# Patient Record
Sex: FEMALE | Race: WHITE | Employment: OTHER | URBAN - NONMETROPOLITAN AREA
[De-identification: names, ages, dates, MRNs, and addresses within clinical notes are randomized per-mention and may not be internally consistent; named-entity substitution may affect disease eponyms.]

---

## 2017-02-15 ENCOUNTER — TELEPHONE (OUTPATIENT)
Dept: INTERNAL MEDICINE | Age: 62
End: 2017-02-15

## 2017-02-28 ENCOUNTER — EVALUATION (OUTPATIENT)
Dept: PHYSICAL THERAPY | Age: 62
End: 2017-02-28

## 2017-02-28 DIAGNOSIS — R26.9 GAIT ABNORMALITY: Primary | ICD-10-CM

## 2017-02-28 PROCEDURE — 97161 PT EVAL LOW COMPLEX 20 MIN: CPT | Performed by: PHYSICAL THERAPIST

## 2017-02-28 PROCEDURE — 97112 NEUROMUSCULAR REEDUCATION: CPT | Performed by: PHYSICAL THERAPIST

## 2017-03-06 ENCOUNTER — TREATMENT (OUTPATIENT)
Dept: PHYSICAL THERAPY | Age: 62
End: 2017-03-06

## 2017-03-06 DIAGNOSIS — R26.9 GAIT ABNORMALITY: Primary | ICD-10-CM

## 2017-03-06 PROCEDURE — 97110 THERAPEUTIC EXERCISES: CPT | Performed by: PHYSICAL THERAPIST

## 2017-03-06 PROCEDURE — 97112 NEUROMUSCULAR REEDUCATION: CPT | Performed by: PHYSICAL THERAPIST

## 2017-03-08 ENCOUNTER — TREATMENT (OUTPATIENT)
Dept: PHYSICAL THERAPY | Age: 62
End: 2017-03-08

## 2017-03-08 DIAGNOSIS — R26.9 GAIT ABNORMALITY: Primary | ICD-10-CM

## 2017-03-08 PROCEDURE — 97112 NEUROMUSCULAR REEDUCATION: CPT | Performed by: PHYSICAL THERAPIST

## 2017-03-08 PROCEDURE — 97110 THERAPEUTIC EXERCISES: CPT | Performed by: PHYSICAL THERAPIST

## 2017-03-10 ENCOUNTER — OFFICE VISIT (OUTPATIENT)
Dept: FAMILY MEDICINE CLINIC | Age: 62
End: 2017-03-10

## 2017-03-10 ENCOUNTER — TREATMENT (OUTPATIENT)
Dept: PHYSICAL THERAPY | Age: 62
End: 2017-03-10

## 2017-03-10 VITALS
HEART RATE: 80 BPM | BODY MASS INDEX: 26.58 KG/M2 | DIASTOLIC BLOOD PRESSURE: 80 MMHG | RESPIRATION RATE: 16 BRPM | WEIGHT: 150 LBS | SYSTOLIC BLOOD PRESSURE: 120 MMHG | HEIGHT: 63 IN

## 2017-03-10 DIAGNOSIS — R26.9 GAIT ABNORMALITY: Primary | ICD-10-CM

## 2017-03-10 DIAGNOSIS — I63.012 CEREBROVASCULAR ACCIDENT (CVA) DUE TO THROMBOSIS OF LEFT VERTEBRAL ARTERY (HCC): Primary | ICD-10-CM

## 2017-03-10 PROCEDURE — 97110 THERAPEUTIC EXERCISES: CPT | Performed by: PHYSICAL THERAPIST

## 2017-03-10 PROCEDURE — G8598 ASA/ANTIPLAT THER USED: HCPCS | Performed by: FAMILY MEDICINE

## 2017-03-10 PROCEDURE — G8484 FLU IMMUNIZE NO ADMIN: HCPCS | Performed by: FAMILY MEDICINE

## 2017-03-10 PROCEDURE — 1036F TOBACCO NON-USER: CPT | Performed by: FAMILY MEDICINE

## 2017-03-10 PROCEDURE — 3017F COLORECTAL CA SCREEN DOC REV: CPT | Performed by: FAMILY MEDICINE

## 2017-03-10 PROCEDURE — 97112 NEUROMUSCULAR REEDUCATION: CPT | Performed by: PHYSICAL THERAPIST

## 2017-03-10 PROCEDURE — 3014F SCREEN MAMMO DOC REV: CPT | Performed by: FAMILY MEDICINE

## 2017-03-10 PROCEDURE — 99214 OFFICE O/P EST MOD 30 MIN: CPT | Performed by: FAMILY MEDICINE

## 2017-03-10 PROCEDURE — G8419 CALC BMI OUT NRM PARAM NOF/U: HCPCS | Performed by: FAMILY MEDICINE

## 2017-03-10 PROCEDURE — G8427 DOCREV CUR MEDS BY ELIG CLIN: HCPCS | Performed by: FAMILY MEDICINE

## 2017-03-10 RX ORDER — CLOPIDOGREL BISULFATE 75 MG/1
TABLET ORAL
Refills: 0 | COMMUNITY
Start: 2017-02-24 | End: 2017-04-25 | Stop reason: SDUPTHER

## 2017-03-10 RX ORDER — ATORVASTATIN CALCIUM 40 MG/1
TABLET, FILM COATED ORAL
Refills: 0 | COMMUNITY
Start: 2017-02-27 | End: 2017-04-25 | Stop reason: SDUPTHER

## 2017-03-10 ASSESSMENT — ENCOUNTER SYMPTOMS
COUGH: 0
SORE THROAT: 0
SINUS PRESSURE: 0
SHORTNESS OF BREATH: 0
EYE DISCHARGE: 0
NAUSEA: 0
RHINORRHEA: 0
EYE REDNESS: 0
VOMITING: 0
WHEEZING: 0
ABDOMINAL PAIN: 0
DIARRHEA: 0
TROUBLE SWALLOWING: 0
CONSTIPATION: 0

## 2017-03-14 ENCOUNTER — TREATMENT (OUTPATIENT)
Dept: PHYSICAL THERAPY | Age: 62
End: 2017-03-14
Payer: COMMERCIAL

## 2017-03-14 DIAGNOSIS — R26.9 GAIT ABNORMALITY: Primary | ICD-10-CM

## 2017-03-14 PROCEDURE — 97112 NEUROMUSCULAR REEDUCATION: CPT | Performed by: PHYSICAL THERAPIST

## 2017-03-14 PROCEDURE — 97110 THERAPEUTIC EXERCISES: CPT | Performed by: PHYSICAL THERAPIST

## 2017-03-15 ENCOUNTER — TREATMENT (OUTPATIENT)
Dept: PHYSICAL THERAPY | Age: 62
End: 2017-03-15
Payer: COMMERCIAL

## 2017-03-15 DIAGNOSIS — R26.9 GAIT ABNORMALITY: Primary | ICD-10-CM

## 2017-03-15 PROCEDURE — 97112 NEUROMUSCULAR REEDUCATION: CPT | Performed by: PHYSICAL THERAPIST

## 2017-04-04 ENCOUNTER — OFFICE VISIT (OUTPATIENT)
Dept: PRIMARY CARE CLINIC | Age: 62
End: 2017-04-04
Payer: COMMERCIAL

## 2017-04-04 VITALS
WEIGHT: 149 LBS | TEMPERATURE: 100.8 F | SYSTOLIC BLOOD PRESSURE: 134 MMHG | RESPIRATION RATE: 16 BRPM | DIASTOLIC BLOOD PRESSURE: 70 MMHG | OXYGEN SATURATION: 94 % | HEART RATE: 115 BPM | BODY MASS INDEX: 25.44 KG/M2 | HEIGHT: 64 IN

## 2017-04-04 DIAGNOSIS — J10.1 INFLUENZA B: Primary | ICD-10-CM

## 2017-04-04 DIAGNOSIS — R50.9 FEVER, UNSPECIFIED FEVER CAUSE: ICD-10-CM

## 2017-04-04 LAB
INFLUENZA A ANTIBODY: NEGATIVE
INFLUENZA B ANTIBODY: POSITIVE

## 2017-04-04 PROCEDURE — 3014F SCREEN MAMMO DOC REV: CPT | Performed by: FAMILY MEDICINE

## 2017-04-04 PROCEDURE — 1036F TOBACCO NON-USER: CPT | Performed by: FAMILY MEDICINE

## 2017-04-04 PROCEDURE — 87804 INFLUENZA ASSAY W/OPTIC: CPT | Performed by: FAMILY MEDICINE

## 2017-04-04 PROCEDURE — G8427 DOCREV CUR MEDS BY ELIG CLIN: HCPCS | Performed by: FAMILY MEDICINE

## 2017-04-04 PROCEDURE — 99214 OFFICE O/P EST MOD 30 MIN: CPT | Performed by: FAMILY MEDICINE

## 2017-04-04 PROCEDURE — 3017F COLORECTAL CA SCREEN DOC REV: CPT | Performed by: FAMILY MEDICINE

## 2017-04-04 PROCEDURE — G8598 ASA/ANTIPLAT THER USED: HCPCS | Performed by: FAMILY MEDICINE

## 2017-04-04 PROCEDURE — G8419 CALC BMI OUT NRM PARAM NOF/U: HCPCS | Performed by: FAMILY MEDICINE

## 2017-04-04 RX ORDER — OSELTAMIVIR PHOSPHATE 75 MG/1
75 CAPSULE ORAL 2 TIMES DAILY
Qty: 10 CAPSULE | Refills: 0 | Status: SHIPPED | OUTPATIENT
Start: 2017-04-04 | End: 2017-04-17 | Stop reason: ALTCHOICE

## 2017-04-04 ASSESSMENT — ENCOUNTER SYMPTOMS
COUGH: 1
SORE THROAT: 1

## 2017-04-05 ASSESSMENT — ENCOUNTER SYMPTOMS
VOMITING: 0
BACK PAIN: 0
EYES NEGATIVE: 1
CONSTIPATION: 0
ABDOMINAL PAIN: 0
NAUSEA: 0
RHINORRHEA: 0
ABDOMINAL DISTENTION: 0
DIARRHEA: 0
SINUS PRESSURE: 0

## 2017-04-17 ENCOUNTER — OFFICE VISIT (OUTPATIENT)
Dept: PRIMARY CARE CLINIC | Age: 62
End: 2017-04-17
Payer: COMMERCIAL

## 2017-04-17 ENCOUNTER — TELEPHONE (OUTPATIENT)
Dept: FAMILY MEDICINE CLINIC | Age: 62
End: 2017-04-17

## 2017-04-17 VITALS
TEMPERATURE: 96.5 F | HEIGHT: 64 IN | SYSTOLIC BLOOD PRESSURE: 134 MMHG | OXYGEN SATURATION: 98 % | RESPIRATION RATE: 12 BRPM | WEIGHT: 150.4 LBS | DIASTOLIC BLOOD PRESSURE: 76 MMHG | HEART RATE: 78 BPM | BODY MASS INDEX: 25.68 KG/M2

## 2017-04-17 DIAGNOSIS — L72.3 INFECTED SEBACEOUS CYST: Primary | ICD-10-CM

## 2017-04-17 DIAGNOSIS — L08.9 INFECTED SEBACEOUS CYST: Primary | ICD-10-CM

## 2017-04-17 PROCEDURE — G8598 ASA/ANTIPLAT THER USED: HCPCS | Performed by: NURSE PRACTITIONER

## 2017-04-17 PROCEDURE — 3017F COLORECTAL CA SCREEN DOC REV: CPT | Performed by: NURSE PRACTITIONER

## 2017-04-17 PROCEDURE — 99213 OFFICE O/P EST LOW 20 MIN: CPT | Performed by: NURSE PRACTITIONER

## 2017-04-17 PROCEDURE — G8419 CALC BMI OUT NRM PARAM NOF/U: HCPCS | Performed by: NURSE PRACTITIONER

## 2017-04-17 PROCEDURE — 1036F TOBACCO NON-USER: CPT | Performed by: NURSE PRACTITIONER

## 2017-04-17 PROCEDURE — 3014F SCREEN MAMMO DOC REV: CPT | Performed by: NURSE PRACTITIONER

## 2017-04-17 PROCEDURE — G8427 DOCREV CUR MEDS BY ELIG CLIN: HCPCS | Performed by: NURSE PRACTITIONER

## 2017-04-17 RX ORDER — DOXYCYCLINE HYCLATE 100 MG
100 TABLET ORAL 2 TIMES DAILY
Qty: 20 TABLET | Refills: 0 | Status: SHIPPED | OUTPATIENT
Start: 2017-04-17 | End: 2017-06-07 | Stop reason: ALTCHOICE

## 2017-04-17 ASSESSMENT — ENCOUNTER SYMPTOMS
CONSTIPATION: 0
NAUSEA: 0
DIARRHEA: 0
COUGH: 0
SINUS PRESSURE: 0
ALLERGIC/IMMUNOLOGIC NEGATIVE: 1
SHORTNESS OF BREATH: 0
ABDOMINAL PAIN: 0
GASTROINTESTINAL NEGATIVE: 1
EYES NEGATIVE: 1
TROUBLE SWALLOWING: 0
VOMITING: 0
RESPIRATORY NEGATIVE: 1
CHEST TIGHTNESS: 0

## 2017-04-25 ENCOUNTER — INITIAL CONSULT (OUTPATIENT)
Dept: SURGERY | Age: 62
End: 2017-04-25
Payer: COMMERCIAL

## 2017-04-25 ENCOUNTER — HOSPITAL ENCOUNTER (OUTPATIENT)
Age: 62
Setting detail: SPECIMEN
Discharge: HOME | End: 2017-04-25
Payer: COMMERCIAL

## 2017-04-25 VITALS
WEIGHT: 149.4 LBS | DIASTOLIC BLOOD PRESSURE: 80 MMHG | BODY MASS INDEX: 25.51 KG/M2 | SYSTOLIC BLOOD PRESSURE: 124 MMHG | RESPIRATION RATE: 12 BRPM | HEART RATE: 88 BPM | TEMPERATURE: 98.2 F | HEIGHT: 64 IN

## 2017-04-25 DIAGNOSIS — L72.0 EPIDERMAL CYST OF NECK: Primary | ICD-10-CM

## 2017-04-25 PROCEDURE — 11423 EXC H-F-NK-SP B9+MARG 2.1-3: CPT | Performed by: SURGERY

## 2017-04-25 PROCEDURE — 1036F TOBACCO NON-USER: CPT | Performed by: SURGERY

## 2017-04-25 RX ORDER — CLOPIDOGREL BISULFATE 75 MG/1
TABLET ORAL
Qty: 90 TABLET | Refills: 1 | Status: SHIPPED | OUTPATIENT
Start: 2017-04-25 | End: 2017-12-07 | Stop reason: SDUPTHER

## 2017-04-25 RX ORDER — ATORVASTATIN CALCIUM 40 MG/1
TABLET, FILM COATED ORAL
Qty: 90 TABLET | Refills: 1 | Status: SHIPPED | OUTPATIENT
Start: 2017-04-25 | End: 2017-12-07 | Stop reason: SDUPTHER

## 2017-04-27 LAB — DERMATOLOGY PATHOLOGY REPORT: NORMAL

## 2017-05-04 ENCOUNTER — TELEPHONE (OUTPATIENT)
Dept: SURGERY | Age: 62
End: 2017-05-04

## 2017-05-12 ENCOUNTER — OFFICE VISIT (OUTPATIENT)
Dept: SURGERY | Age: 62
End: 2017-05-12

## 2017-05-12 VITALS
TEMPERATURE: 97.7 F | BODY MASS INDEX: 25.95 KG/M2 | WEIGHT: 152 LBS | DIASTOLIC BLOOD PRESSURE: 68 MMHG | SYSTOLIC BLOOD PRESSURE: 118 MMHG | HEIGHT: 64 IN

## 2017-05-12 DIAGNOSIS — L72.0 EPIDERMAL CYST OF NECK: Primary | ICD-10-CM

## 2017-05-12 DIAGNOSIS — T81.31XA WOUND DEHISCENCE, INITIAL ENCOUNTER: ICD-10-CM

## 2017-05-12 PROCEDURE — 99024 POSTOP FOLLOW-UP VISIT: CPT | Performed by: SURGERY

## 2017-05-19 DIAGNOSIS — Z11.59 NEED FOR HEPATITIS C SCREENING TEST: ICD-10-CM

## 2017-05-19 DIAGNOSIS — Z11.4 SCREENING FOR HIV (HUMAN IMMUNODEFICIENCY VIRUS): Primary | ICD-10-CM

## 2017-06-07 ENCOUNTER — OFFICE VISIT (OUTPATIENT)
Dept: FAMILY MEDICINE CLINIC | Age: 62
End: 2017-06-07
Payer: COMMERCIAL

## 2017-06-07 VITALS
HEIGHT: 64 IN | SYSTOLIC BLOOD PRESSURE: 110 MMHG | RESPIRATION RATE: 16 BRPM | DIASTOLIC BLOOD PRESSURE: 76 MMHG | HEART RATE: 80 BPM | WEIGHT: 153 LBS | BODY MASS INDEX: 26.12 KG/M2

## 2017-06-07 DIAGNOSIS — E03.9 HYPOTHYROIDISM, UNSPECIFIED TYPE: ICD-10-CM

## 2017-06-07 DIAGNOSIS — Z23 NEED FOR PROPHYLACTIC VACCINATION AGAINST DIPHTHERIA-TETANUS-PERTUSSIS (DTP): Primary | ICD-10-CM

## 2017-06-07 DIAGNOSIS — E78.2 MIXED HYPERLIPIDEMIA: ICD-10-CM

## 2017-06-07 DIAGNOSIS — I63.012 CEREBROVASCULAR ACCIDENT (CVA) DUE TO THROMBOSIS OF LEFT VERTEBRAL ARTERY (HCC): ICD-10-CM

## 2017-06-07 DIAGNOSIS — M17.11 PRIMARY OSTEOARTHRITIS OF RIGHT KNEE: ICD-10-CM

## 2017-06-07 DIAGNOSIS — R73.01 IMPAIRED FASTING GLUCOSE: ICD-10-CM

## 2017-06-07 PROCEDURE — G8598 ASA/ANTIPLAT THER USED: HCPCS | Performed by: FAMILY MEDICINE

## 2017-06-07 PROCEDURE — 3017F COLORECTAL CA SCREEN DOC REV: CPT | Performed by: FAMILY MEDICINE

## 2017-06-07 PROCEDURE — G8427 DOCREV CUR MEDS BY ELIG CLIN: HCPCS | Performed by: FAMILY MEDICINE

## 2017-06-07 PROCEDURE — G8419 CALC BMI OUT NRM PARAM NOF/U: HCPCS | Performed by: FAMILY MEDICINE

## 2017-06-07 PROCEDURE — 99214 OFFICE O/P EST MOD 30 MIN: CPT | Performed by: FAMILY MEDICINE

## 2017-06-07 PROCEDURE — 3014F SCREEN MAMMO DOC REV: CPT | Performed by: FAMILY MEDICINE

## 2017-06-07 PROCEDURE — 1036F TOBACCO NON-USER: CPT | Performed by: FAMILY MEDICINE

## 2017-06-07 RX ORDER — LEVOTHYROXINE SODIUM 0.07 MG/1
75 TABLET ORAL DAILY
Qty: 90 TABLET | Refills: 1 | Status: SHIPPED | OUTPATIENT
Start: 2017-06-07 | End: 2017-12-07 | Stop reason: SDUPTHER

## 2017-06-12 ASSESSMENT — ENCOUNTER SYMPTOMS
NAUSEA: 0
EYE REDNESS: 0
ABDOMINAL PAIN: 0
SHORTNESS OF BREATH: 0
CONSTIPATION: 0
RHINORRHEA: 0
DIARRHEA: 0
SINUS PRESSURE: 0
SORE THROAT: 0
COUGH: 0
WHEEZING: 0
VOMITING: 0
EYE DISCHARGE: 0
TROUBLE SWALLOWING: 0

## 2017-11-20 ENCOUNTER — OFFICE VISIT (OUTPATIENT)
Dept: PRIMARY CARE CLINIC | Age: 62
End: 2017-11-20
Payer: COMMERCIAL

## 2017-11-20 VITALS
HEIGHT: 64 IN | DIASTOLIC BLOOD PRESSURE: 74 MMHG | HEART RATE: 88 BPM | TEMPERATURE: 98.3 F | OXYGEN SATURATION: 98 % | BODY MASS INDEX: 26.53 KG/M2 | WEIGHT: 155.4 LBS | SYSTOLIC BLOOD PRESSURE: 124 MMHG

## 2017-11-20 DIAGNOSIS — B34.9 VIRAL ILLNESS: Primary | ICD-10-CM

## 2017-11-20 DIAGNOSIS — R05.9 COUGH: ICD-10-CM

## 2017-11-20 PROCEDURE — G8427 DOCREV CUR MEDS BY ELIG CLIN: HCPCS | Performed by: NURSE PRACTITIONER

## 2017-11-20 PROCEDURE — G8598 ASA/ANTIPLAT THER USED: HCPCS | Performed by: NURSE PRACTITIONER

## 2017-11-20 PROCEDURE — 3014F SCREEN MAMMO DOC REV: CPT | Performed by: NURSE PRACTITIONER

## 2017-11-20 PROCEDURE — 3017F COLORECTAL CA SCREEN DOC REV: CPT | Performed by: NURSE PRACTITIONER

## 2017-11-20 PROCEDURE — G8417 CALC BMI ABV UP PARAM F/U: HCPCS | Performed by: NURSE PRACTITIONER

## 2017-11-20 PROCEDURE — G8484 FLU IMMUNIZE NO ADMIN: HCPCS | Performed by: NURSE PRACTITIONER

## 2017-11-20 PROCEDURE — 99213 OFFICE O/P EST LOW 20 MIN: CPT | Performed by: NURSE PRACTITIONER

## 2017-11-20 PROCEDURE — 1036F TOBACCO NON-USER: CPT | Performed by: NURSE PRACTITIONER

## 2017-11-20 RX ORDER — PREDNISONE 20 MG/1
20 TABLET ORAL DAILY
Qty: 5 TABLET | Refills: 0 | Status: SHIPPED | OUTPATIENT
Start: 2017-11-20 | End: 2017-11-25

## 2017-11-20 RX ORDER — BENZONATATE 100 MG/1
100 CAPSULE ORAL 3 TIMES DAILY PRN
Qty: 30 CAPSULE | Refills: 0 | Status: SHIPPED | OUTPATIENT
Start: 2017-11-20 | End: 2017-12-07 | Stop reason: ALTCHOICE

## 2017-11-20 ASSESSMENT — ENCOUNTER SYMPTOMS
SHORTNESS OF BREATH: 1
CHEST TIGHTNESS: 0
NAUSEA: 0
DIARRHEA: 0
COUGH: 1
RHINORRHEA: 1
SORE THROAT: 1
SINUS PRESSURE: 0
VOMITING: 0
WHEEZING: 0
ABDOMINAL PAIN: 0

## 2017-11-20 NOTE — PATIENT INSTRUCTIONS
breathing. · You passed out (lost consciousness). Call your doctor now or seek immediate medical care if:  · You seem to be getting much sicker. · You have a new or higher fever. · You have blood in your stools. · You have new belly pain, or your pain gets worse. · You have a new rash. Watch closely for changes in your health, and be sure to contact your doctor if:  · You start to get better and then get worse. · You do not get better as expected. Where can you learn more? Go to https://InnovEcopeContinuityX Solutionseb.Original. org and sign in to your [employer] account. Enter O681 in the Pro Stream + box to learn more about \"Viral Infections: Care Instructions. \"     If you do not have an account, please click on the \"Sign Up Now\" link. Current as of: March 3, 2017  Content Version: 11.3  © 0810-8572 [employer]. Care instructions adapted under license by [facility] ([facility]). If you have questions about a medical condition or this instruction, always ask your healthcare professional. [address] any warranty or liability for your use of this information.

## 2017-12-01 ENCOUNTER — HOSPITAL ENCOUNTER (OUTPATIENT)
Dept: LAB | Age: 62
Setting detail: SPECIMEN
Discharge: HOME | End: 2017-12-01
Payer: COMMERCIAL

## 2017-12-01 DIAGNOSIS — E03.9 HYPOTHYROIDISM, UNSPECIFIED TYPE: ICD-10-CM

## 2017-12-01 DIAGNOSIS — R73.01 IMPAIRED FASTING GLUCOSE: ICD-10-CM

## 2017-12-01 DIAGNOSIS — E78.2 MIXED HYPERLIPIDEMIA: ICD-10-CM

## 2017-12-01 LAB
ABSOLUTE EOS #: 0.1 K/UL (ref 0–0.4)
ABSOLUTE IMMATURE GRANULOCYTE: NORMAL K/UL (ref 0–0.3)
ABSOLUTE LYMPH #: 1.9 K/UL (ref 1–4.8)
ABSOLUTE MONO #: 0.4 K/UL (ref 0.1–1.2)
ALBUMIN SERPL-MCNC: 4.4 G/DL (ref 3.5–5.2)
ALBUMIN/GLOBULIN RATIO: 1.8 (ref 1–2.5)
ALP BLD-CCNC: 110 U/L (ref 35–104)
ALT SERPL-CCNC: 25 U/L (ref 5–33)
ANION GAP SERPL CALCULATED.3IONS-SCNC: 15 MMOL/L (ref 9–17)
AST SERPL-CCNC: 19 U/L
BASOPHILS # BLD: 1 % (ref 0–1)
BASOPHILS ABSOLUTE: 0 K/UL (ref 0–0.2)
BILIRUB SERPL-MCNC: 0.79 MG/DL (ref 0.3–1.2)
BUN BLDV-MCNC: 10 MG/DL (ref 8–23)
BUN/CREAT BLD: 16 (ref 9–20)
CALCIUM SERPL-MCNC: 9.6 MG/DL (ref 8.6–10.4)
CHLORIDE BLD-SCNC: 105 MMOL/L (ref 98–107)
CHOLESTEROL/HDL RATIO: 3.4
CHOLESTEROL: 152 MG/DL
CO2: 25 MMOL/L (ref 20–31)
CREAT SERPL-MCNC: 0.62 MG/DL (ref 0.5–0.9)
DIFFERENTIAL TYPE: NORMAL
EOSINOPHILS RELATIVE PERCENT: 3 % (ref 1–7)
ESTIMATED AVERAGE GLUCOSE: 120 MG/DL
GFR AFRICAN AMERICAN: >60 ML/MIN
GFR NON-AFRICAN AMERICAN: >60 ML/MIN
GFR SERPL CREATININE-BSD FRML MDRD: ABNORMAL ML/MIN/{1.73_M2}
GFR SERPL CREATININE-BSD FRML MDRD: ABNORMAL ML/MIN/{1.73_M2}
GLUCOSE BLD-MCNC: 110 MG/DL (ref 70–99)
HBA1C MFR BLD: 5.8 % (ref 4.8–5.9)
HCT VFR BLD CALC: 40.9 % (ref 36–46)
HDLC SERPL-MCNC: 45 MG/DL
HEMOGLOBIN: 13.8 G/DL (ref 12–16)
IMMATURE GRANULOCYTES: NORMAL %
LDL CHOLESTEROL: 74 MG/DL (ref 0–130)
LYMPHOCYTES # BLD: 42 % (ref 16–46)
MCH RBC QN AUTO: 32.4 PG (ref 26–34)
MCHC RBC AUTO-ENTMCNC: 33.6 G/DL (ref 31–37)
MCV RBC AUTO: 96.3 FL (ref 80–100)
MONOCYTES # BLD: 9 % (ref 4–11)
PDW BLD-RTO: 13.1 % (ref 11–14.5)
PLATELET # BLD: 168 K/UL (ref 140–450)
PLATELET ESTIMATE: NORMAL
PMV BLD AUTO: 8.4 FL (ref 6–12)
POTASSIUM SERPL-SCNC: 4.1 MMOL/L (ref 3.7–5.3)
RBC # BLD: 4.25 M/UL (ref 4–5.2)
RBC # BLD: NORMAL 10*6/UL
SEG NEUTROPHILS: 45 % (ref 43–77)
SEGMENTED NEUTROPHILS ABSOLUTE COUNT: 2.1 K/UL (ref 1.8–7.7)
SODIUM BLD-SCNC: 145 MMOL/L (ref 135–144)
THYROXINE, FREE: 1.06 NG/DL (ref 0.93–1.7)
TOTAL PROTEIN: 6.9 G/DL (ref 6.4–8.3)
TRIGL SERPL-MCNC: 166 MG/DL
TSH SERPL DL<=0.05 MIU/L-ACNC: 3.25 MIU/L (ref 0.3–5)
VLDLC SERPL CALC-MCNC: ABNORMAL MG/DL (ref 1–30)
WBC # BLD: 4.5 K/UL (ref 3.5–11)
WBC # BLD: NORMAL 10*3/UL

## 2017-12-01 PROCEDURE — 84443 ASSAY THYROID STIM HORMONE: CPT

## 2017-12-01 PROCEDURE — 84439 ASSAY OF FREE THYROXINE: CPT

## 2017-12-01 PROCEDURE — 85025 COMPLETE CBC W/AUTO DIFF WBC: CPT

## 2017-12-01 PROCEDURE — 83036 HEMOGLOBIN GLYCOSYLATED A1C: CPT

## 2017-12-01 PROCEDURE — 36415 COLL VENOUS BLD VENIPUNCTURE: CPT

## 2017-12-01 PROCEDURE — 80053 COMPREHEN METABOLIC PANEL: CPT

## 2017-12-01 PROCEDURE — 80061 LIPID PANEL: CPT

## 2017-12-07 ENCOUNTER — OFFICE VISIT (OUTPATIENT)
Dept: FAMILY MEDICINE CLINIC | Age: 62
End: 2017-12-07
Payer: COMMERCIAL

## 2017-12-07 VITALS
WEIGHT: 153 LBS | OXYGEN SATURATION: 95 % | HEIGHT: 64 IN | DIASTOLIC BLOOD PRESSURE: 80 MMHG | HEART RATE: 88 BPM | BODY MASS INDEX: 26.12 KG/M2 | RESPIRATION RATE: 16 BRPM | SYSTOLIC BLOOD PRESSURE: 118 MMHG

## 2017-12-07 DIAGNOSIS — E03.9 HYPOTHYROIDISM, UNSPECIFIED TYPE: ICD-10-CM

## 2017-12-07 DIAGNOSIS — E78.2 MIXED HYPERLIPIDEMIA: ICD-10-CM

## 2017-12-07 DIAGNOSIS — Z23 NEED FOR INFLUENZA VACCINATION: ICD-10-CM

## 2017-12-07 DIAGNOSIS — L23.7 ALLERGIC CONTACT DERMATITIS DUE TO PLANTS, EXCEPT FOOD: ICD-10-CM

## 2017-12-07 DIAGNOSIS — Z12.31 SCREENING MAMMOGRAM, ENCOUNTER FOR: ICD-10-CM

## 2017-12-07 DIAGNOSIS — R73.01 IMPAIRED FASTING GLUCOSE: Primary | ICD-10-CM

## 2017-12-07 PROCEDURE — 3014F SCREEN MAMMO DOC REV: CPT | Performed by: FAMILY MEDICINE

## 2017-12-07 PROCEDURE — 99214 OFFICE O/P EST MOD 30 MIN: CPT | Performed by: FAMILY MEDICINE

## 2017-12-07 PROCEDURE — 3017F COLORECTAL CA SCREEN DOC REV: CPT | Performed by: FAMILY MEDICINE

## 2017-12-07 PROCEDURE — G8598 ASA/ANTIPLAT THER USED: HCPCS | Performed by: FAMILY MEDICINE

## 2017-12-07 PROCEDURE — 1036F TOBACCO NON-USER: CPT | Performed by: FAMILY MEDICINE

## 2017-12-07 PROCEDURE — G8427 DOCREV CUR MEDS BY ELIG CLIN: HCPCS | Performed by: FAMILY MEDICINE

## 2017-12-07 PROCEDURE — G8484 FLU IMMUNIZE NO ADMIN: HCPCS | Performed by: FAMILY MEDICINE

## 2017-12-07 PROCEDURE — G8417 CALC BMI ABV UP PARAM F/U: HCPCS | Performed by: FAMILY MEDICINE

## 2017-12-07 PROCEDURE — 90686 IIV4 VACC NO PRSV 0.5 ML IM: CPT | Performed by: FAMILY MEDICINE

## 2017-12-07 PROCEDURE — 90471 IMMUNIZATION ADMIN: CPT | Performed by: FAMILY MEDICINE

## 2017-12-07 RX ORDER — LEVOTHYROXINE SODIUM 0.07 MG/1
75 TABLET ORAL DAILY
Qty: 90 TABLET | Refills: 1 | Status: SHIPPED | OUTPATIENT
Start: 2017-12-07 | End: 2018-06-08 | Stop reason: SDUPTHER

## 2017-12-07 RX ORDER — PREDNISONE 20 MG/1
TABLET ORAL
Qty: 10 TABLET | Refills: 0 | Status: SHIPPED | OUTPATIENT
Start: 2017-12-07 | End: 2018-06-08 | Stop reason: ALTCHOICE

## 2017-12-07 RX ORDER — TRIAMCINOLONE ACETONIDE 1 MG/G
CREAM TOPICAL 2 TIMES DAILY
Qty: 80 G | Refills: 1 | Status: SHIPPED | OUTPATIENT
Start: 2017-12-07 | End: 2018-06-08 | Stop reason: ALTCHOICE

## 2017-12-07 RX ORDER — ATORVASTATIN CALCIUM 40 MG/1
TABLET, FILM COATED ORAL
Qty: 90 TABLET | Refills: 1 | Status: SHIPPED | OUTPATIENT
Start: 2017-12-07 | End: 2018-06-08 | Stop reason: SDUPTHER

## 2017-12-07 RX ORDER — CLOPIDOGREL BISULFATE 75 MG/1
TABLET ORAL
Qty: 90 TABLET | Refills: 1 | Status: SHIPPED | OUTPATIENT
Start: 2017-12-07 | End: 2018-06-08 | Stop reason: SDUPTHER

## 2017-12-07 ASSESSMENT — PATIENT HEALTH QUESTIONNAIRE - PHQ9
1. LITTLE INTEREST OR PLEASURE IN DOING THINGS: 0
SUM OF ALL RESPONSES TO PHQ9 QUESTIONS 1 & 2: 0
SUM OF ALL RESPONSES TO PHQ QUESTIONS 1-9: 0
2. FEELING DOWN, DEPRESSED OR HOPELESS: 0

## 2017-12-07 ASSESSMENT — ENCOUNTER SYMPTOMS
SINUS PRESSURE: 0
WHEEZING: 0
VOMITING: 0
EYE REDNESS: 0
EYE DISCHARGE: 0
CONSTIPATION: 0
ABDOMINAL PAIN: 0
TROUBLE SWALLOWING: 0
RHINORRHEA: 0
SHORTNESS OF BREATH: 0
SORE THROAT: 0
COUGH: 0
NAUSEA: 0
DIARRHEA: 0

## 2017-12-07 NOTE — PROGRESS NOTES
Result Value Ref Range    TSH 3.25 0.30 - 5.00 mIU/L   T4, Free   Result Value Ref Range    Thyroxine, Free 1.06 0.93 - 1.70 ng/dL     Did discuss dietary modification and increased exercise to keep cholesterol and blood sugar under good control. Other review of systems are as noted below. Did review patient's med list, allergies, social history, fam history, pmhx and pshx today as noted in the record. Preventative measures are reviewed today. See health maintenance section for complete preventative plan of care. Review of Systems   Constitutional: Negative for chills, fatigue and fever. HENT: Negative for congestion, ear pain, postnasal drip, rhinorrhea, sinus pressure, sore throat and trouble swallowing. Eyes: Negative for discharge and redness. Respiratory: Negative for cough, shortness of breath and wheezing. Cardiovascular: Negative for chest pain. Gastrointestinal: Negative for abdominal pain, constipation, diarrhea, nausea and vomiting. Musculoskeletal: Negative for arthralgias, myalgias and neck pain. Skin: Positive for rash. Negative for wound. Allergic/Immunologic: Negative for environmental allergies. Neurological: Negative for dizziness, weakness, light-headedness and headaches. Hematological: Negative for adenopathy. Psychiatric/Behavioral: Negative. Objective:   Physical Exam   Constitutional: She is oriented to person, place, and time. She appears well-developed and well-nourished. No distress. HENT:   Head: Normocephalic and atraumatic. Right Ear: External ear normal.   Left Ear: External ear normal.   Nose: Nose normal.   Mouth/Throat: Oropharynx is clear and moist. No oropharyngeal exudate. Eyes: Conjunctivae and EOM are normal. Pupils are equal, round, and reactive to light. Right eye exhibits no discharge. Left eye exhibits no discharge. Neck: Normal range of motion. Neck supple. No thyromegaly present.    Cardiovascular: Normal rate, regular should not wear powder, perfume, lotions, or underarm deodorant to the breast or underarm area. Please wear a two piece outfit and be prepared to give information about prior breast procedures including mammograms, biopsies, or surgeries. Order Specific Question:   Reason for exam:     Answer:   screening    INFLUENZA, QUADV, 3 YRS AND OLDER, IM, PF, PREFILL SYR OR SDV, 0.5ML (Rosalea Saint, PF)     Dx: Z23    CBC Auto Differential     To be done in 6 months     Standing Status:   Future     Standing Expiration Date:   12/7/2018    Comprehensive Metabolic Panel     To be done in 6 months     Standing Status:   Future     Standing Expiration Date:   12/7/2018    Hemoglobin A1C     To be done in 6 months     Standing Status:   Future     Standing Expiration Date:   12/7/2018    Lipid Panel     To be done in 6 months     Standing Status:   Future     Standing Expiration Date:   12/7/2018     Order Specific Question:   Is Patient Fasting?/# of Hours     Answer:   12 hours    TSH without Reflex     To be done in 6 months     Standing Status:   Future     Standing Expiration Date:   12/7/2018    T4, Free     To be done in 6 months     Standing Status:   Future     Standing Expiration Date:   12/7/2018     Will give oral steroid for 5 days and topical steroid to place on the contact dermatitis. Patient is to follow a low carb/low sugar/low fat diet and increase exercise for optimal blood sugar and cholesterol control. Patient is to continue on her current medical therapy. No additional changes are made. Patient is to return to my office in 6 months duration or sooner if any further problems or symptoms arise.     (Please note that portions of this note were completed with a voice recognition program. Efforts were made to edit the dictations but occasionally words are mis-transcribed.)

## 2017-12-07 NOTE — PROGRESS NOTES
Visit Information    Have you changed or started any medications since your last visit including any over-the-counter medicines, vitamins, or herbal medicines? no   Are you having any side effects from any of your medications? -  no  Have you stopped taking any of your medications? Is so, why? -  no    Have you seen any other physician or provider since your last visit? Yes - Records Obtained  Have you had any other diagnostic tests since your last visit? Yes - Records Obtained  Have you been seen in the emergency room and/or had an admission to a hospital since we last saw you? No  Have you had your routine dental cleaning in the past 6 months? no    Have you activated your AddThis account? If not, what are your barriers?  No: not interested     Patient Care Team:  Maury Rojas DO as PCP - General (Family Medicine)    Medical History Review  Past Medical, Family, and Social History reviewed and does contribute to the patient presenting condition    Health Maintenance   Topic Date Due    Flu vaccine (1) 09/01/2017    Colon cancer screen colonoscopy  05/21/2018    Breast cancer screen  05/18/2019    Cervical cancer screen  10/05/2019    Diabetes screen  12/01/2020    Lipid screen  12/01/2022    DTaP/Tdap/Td vaccine (2 - Td) 06/09/2027    Zostavax vaccine  Addressed    Hepatitis C screen  Completed    HIV screen  Completed

## 2017-12-07 NOTE — PROGRESS NOTES
Have you had an allergic reaction to the flu shot? N    Are you allergic to eggs? N    Do you have a history of Guillain-Columbia Syndrome which is a neurological disorder?  Clark Rodriguez

## 2018-05-31 ENCOUNTER — HOSPITAL ENCOUNTER (OUTPATIENT)
Dept: LAB | Age: 63
Setting detail: SPECIMEN
Discharge: HOME | End: 2018-05-31
Payer: COMMERCIAL

## 2018-05-31 ENCOUNTER — HOSPITAL ENCOUNTER (OUTPATIENT)
Dept: MAMMOGRAPHY | Age: 63
Discharge: HOME | End: 2018-06-02
Payer: COMMERCIAL

## 2018-05-31 DIAGNOSIS — E03.9 HYPOTHYROIDISM, UNSPECIFIED TYPE: ICD-10-CM

## 2018-05-31 DIAGNOSIS — Z12.31 SCREENING MAMMOGRAM, ENCOUNTER FOR: ICD-10-CM

## 2018-05-31 DIAGNOSIS — E78.2 MIXED HYPERLIPIDEMIA: ICD-10-CM

## 2018-05-31 DIAGNOSIS — R73.01 IMPAIRED FASTING GLUCOSE: ICD-10-CM

## 2018-05-31 LAB
ABSOLUTE EOS #: 0.2 K/UL (ref 0–0.4)
ABSOLUTE IMMATURE GRANULOCYTE: NORMAL K/UL (ref 0–0.3)
ABSOLUTE LYMPH #: 1.6 K/UL (ref 1–4.8)
ABSOLUTE MONO #: 0.3 K/UL (ref 0.1–1.2)
ALBUMIN SERPL-MCNC: 4.5 G/DL (ref 3.5–5.2)
ALBUMIN/GLOBULIN RATIO: 2 (ref 1–2.5)
ALP BLD-CCNC: 106 U/L (ref 35–104)
ALT SERPL-CCNC: 24 U/L (ref 5–33)
ANION GAP SERPL CALCULATED.3IONS-SCNC: 13 MMOL/L (ref 9–17)
AST SERPL-CCNC: 17 U/L
BASOPHILS # BLD: 1 % (ref 0–1)
BASOPHILS ABSOLUTE: 0 K/UL (ref 0–0.2)
BILIRUB SERPL-MCNC: 1.16 MG/DL (ref 0.3–1.2)
BUN BLDV-MCNC: 14 MG/DL (ref 8–23)
BUN/CREAT BLD: 26 (ref 9–20)
CALCIUM SERPL-MCNC: 9.1 MG/DL (ref 8.6–10.4)
CHLORIDE BLD-SCNC: 103 MMOL/L (ref 98–107)
CHOLESTEROL/HDL RATIO: 3
CHOLESTEROL: 130 MG/DL
CO2: 27 MMOL/L (ref 20–31)
CREAT SERPL-MCNC: 0.53 MG/DL (ref 0.5–0.9)
DIFFERENTIAL TYPE: NORMAL
EOSINOPHILS RELATIVE PERCENT: 4 % (ref 1–7)
ESTIMATED AVERAGE GLUCOSE: 111 MG/DL
GFR AFRICAN AMERICAN: >60 ML/MIN
GFR NON-AFRICAN AMERICAN: >60 ML/MIN
GFR SERPL CREATININE-BSD FRML MDRD: ABNORMAL ML/MIN/{1.73_M2}
GFR SERPL CREATININE-BSD FRML MDRD: ABNORMAL ML/MIN/{1.73_M2}
GLUCOSE BLD-MCNC: 108 MG/DL (ref 70–99)
HBA1C MFR BLD: 5.5 % (ref 4.8–5.9)
HCT VFR BLD CALC: 39 % (ref 36–46)
HDLC SERPL-MCNC: 43 MG/DL
HEMOGLOBIN: 13.3 G/DL (ref 12–16)
IMMATURE GRANULOCYTES: NORMAL %
LDL CHOLESTEROL: 56 MG/DL (ref 0–130)
LYMPHOCYTES # BLD: 37 % (ref 16–46)
MCH RBC QN AUTO: 33 PG (ref 26–34)
MCHC RBC AUTO-ENTMCNC: 34.1 G/DL (ref 31–37)
MCV RBC AUTO: 96.7 FL (ref 80–100)
MONOCYTES # BLD: 8 % (ref 4–11)
NRBC AUTOMATED: NORMAL PER 100 WBC
PDW BLD-RTO: 12.7 % (ref 11–14.5)
PLATELET # BLD: 199 K/UL (ref 140–450)
PLATELET ESTIMATE: NORMAL
PMV BLD AUTO: 8.4 FL (ref 6–12)
POTASSIUM SERPL-SCNC: 3.8 MMOL/L (ref 3.7–5.3)
RBC # BLD: 4.03 M/UL (ref 4–5.2)
RBC # BLD: NORMAL 10*6/UL
SEG NEUTROPHILS: 50 % (ref 43–77)
SEGMENTED NEUTROPHILS ABSOLUTE COUNT: 2.2 K/UL (ref 1.8–7.7)
SODIUM BLD-SCNC: 143 MMOL/L (ref 135–144)
THYROXINE, FREE: 1.02 NG/DL (ref 0.93–1.7)
TOTAL PROTEIN: 6.8 G/DL (ref 6.4–8.3)
TRIGL SERPL-MCNC: 155 MG/DL
TSH SERPL DL<=0.05 MIU/L-ACNC: 2.91 MIU/L (ref 0.3–5)
VLDLC SERPL CALC-MCNC: ABNORMAL MG/DL (ref 1–30)
WBC # BLD: 4.3 K/UL (ref 3.5–11)
WBC # BLD: NORMAL 10*3/UL

## 2018-05-31 PROCEDURE — 80053 COMPREHEN METABOLIC PANEL: CPT

## 2018-05-31 PROCEDURE — 77063 BREAST TOMOSYNTHESIS BI: CPT

## 2018-05-31 PROCEDURE — 84439 ASSAY OF FREE THYROXINE: CPT

## 2018-05-31 PROCEDURE — 85025 COMPLETE CBC W/AUTO DIFF WBC: CPT

## 2018-05-31 PROCEDURE — 83036 HEMOGLOBIN GLYCOSYLATED A1C: CPT

## 2018-05-31 PROCEDURE — 36415 COLL VENOUS BLD VENIPUNCTURE: CPT

## 2018-05-31 PROCEDURE — 80061 LIPID PANEL: CPT

## 2018-05-31 PROCEDURE — 84443 ASSAY THYROID STIM HORMONE: CPT

## 2018-06-08 ENCOUNTER — OFFICE VISIT (OUTPATIENT)
Dept: FAMILY MEDICINE CLINIC | Age: 63
End: 2018-06-08
Payer: COMMERCIAL

## 2018-06-08 VITALS
BODY MASS INDEX: 26.63 KG/M2 | SYSTOLIC BLOOD PRESSURE: 120 MMHG | RESPIRATION RATE: 16 BRPM | HEART RATE: 72 BPM | DIASTOLIC BLOOD PRESSURE: 80 MMHG | HEIGHT: 64 IN | WEIGHT: 156 LBS

## 2018-06-08 DIAGNOSIS — I63.012 CEREBROVASCULAR ACCIDENT (CVA) DUE TO THROMBOSIS OF LEFT VERTEBRAL ARTERY (HCC): ICD-10-CM

## 2018-06-08 DIAGNOSIS — E03.9 ACQUIRED HYPOTHYROIDISM: ICD-10-CM

## 2018-06-08 DIAGNOSIS — Z12.11 ENCOUNTER FOR SCREENING COLONOSCOPY: ICD-10-CM

## 2018-06-08 DIAGNOSIS — E78.2 MIXED HYPERLIPIDEMIA: ICD-10-CM

## 2018-06-08 DIAGNOSIS — R06.09 DYSPNEA ON EXERTION: ICD-10-CM

## 2018-06-08 DIAGNOSIS — R73.01 IMPAIRED FASTING GLUCOSE: Primary | ICD-10-CM

## 2018-06-08 PROCEDURE — 1036F TOBACCO NON-USER: CPT | Performed by: FAMILY MEDICINE

## 2018-06-08 PROCEDURE — G8417 CALC BMI ABV UP PARAM F/U: HCPCS | Performed by: FAMILY MEDICINE

## 2018-06-08 PROCEDURE — G8598 ASA/ANTIPLAT THER USED: HCPCS | Performed by: FAMILY MEDICINE

## 2018-06-08 PROCEDURE — 3017F COLORECTAL CA SCREEN DOC REV: CPT | Performed by: FAMILY MEDICINE

## 2018-06-08 PROCEDURE — 93000 ELECTROCARDIOGRAM COMPLETE: CPT | Performed by: FAMILY MEDICINE

## 2018-06-08 PROCEDURE — 99214 OFFICE O/P EST MOD 30 MIN: CPT | Performed by: FAMILY MEDICINE

## 2018-06-08 PROCEDURE — G8427 DOCREV CUR MEDS BY ELIG CLIN: HCPCS | Performed by: FAMILY MEDICINE

## 2018-06-08 RX ORDER — ATORVASTATIN CALCIUM 40 MG/1
TABLET, FILM COATED ORAL
Qty: 90 TABLET | Refills: 1 | Status: SHIPPED | OUTPATIENT
Start: 2018-06-08 | End: 2018-12-10 | Stop reason: SDUPTHER

## 2018-06-08 RX ORDER — CLOPIDOGREL BISULFATE 75 MG/1
TABLET ORAL
Qty: 90 TABLET | Refills: 1 | Status: SHIPPED | OUTPATIENT
Start: 2018-06-08 | End: 2018-12-10 | Stop reason: SDUPTHER

## 2018-06-08 RX ORDER — LEVOTHYROXINE SODIUM 0.07 MG/1
75 TABLET ORAL DAILY
Qty: 90 TABLET | Refills: 1 | Status: SHIPPED | OUTPATIENT
Start: 2018-06-08 | End: 2018-12-10 | Stop reason: SDUPTHER

## 2018-06-08 ASSESSMENT — ENCOUNTER SYMPTOMS
RHINORRHEA: 0
EYE REDNESS: 0
WHEEZING: 0
VOMITING: 0
EYE DISCHARGE: 0
NAUSEA: 0
COUGH: 0
SHORTNESS OF BREATH: 1
TROUBLE SWALLOWING: 0
DIARRHEA: 1
SINUS PRESSURE: 0
SORE THROAT: 0
CONSTIPATION: 0
ABDOMINAL PAIN: 0

## 2018-07-06 ENCOUNTER — TELEPHONE (OUTPATIENT)
Dept: SURGERY | Age: 63
End: 2018-07-06

## 2018-07-06 ENCOUNTER — OFFICE VISIT (OUTPATIENT)
Dept: SURGERY | Age: 63
End: 2018-07-06
Payer: MEDICARE

## 2018-07-06 VITALS
HEART RATE: 84 BPM | SYSTOLIC BLOOD PRESSURE: 138 MMHG | BODY MASS INDEX: 26.53 KG/M2 | HEIGHT: 64 IN | RESPIRATION RATE: 16 BRPM | TEMPERATURE: 98 F | WEIGHT: 155.4 LBS | DIASTOLIC BLOOD PRESSURE: 78 MMHG

## 2018-07-06 DIAGNOSIS — Z12.11 ENCOUNTER FOR SCREENING COLONOSCOPY: Primary | ICD-10-CM

## 2018-07-06 DIAGNOSIS — R19.7 DIARRHEA, UNSPECIFIED TYPE: ICD-10-CM

## 2018-07-06 PROCEDURE — G8417 CALC BMI ABV UP PARAM F/U: HCPCS | Performed by: SURGERY

## 2018-07-06 PROCEDURE — 3017F COLORECTAL CA SCREEN DOC REV: CPT | Performed by: SURGERY

## 2018-07-06 PROCEDURE — G8598 ASA/ANTIPLAT THER USED: HCPCS | Performed by: SURGERY

## 2018-07-06 PROCEDURE — 99203 OFFICE O/P NEW LOW 30 MIN: CPT | Performed by: SURGERY

## 2018-07-06 PROCEDURE — 1036F TOBACCO NON-USER: CPT | Performed by: SURGERY

## 2018-07-06 PROCEDURE — G8427 DOCREV CUR MEDS BY ELIG CLIN: HCPCS | Performed by: SURGERY

## 2018-07-06 NOTE — TELEPHONE ENCOUNTER
Patient was called and message left to hold asa and plavix for 5 days. Patient to call with any questions.

## 2018-07-06 NOTE — PATIENT INSTRUCTIONS
is worse than the test. It may be uncomfortable, and you may feel hungry on the clear liquid diet. Some people do not go to work or do their usual activities on the day of the prep. Arrange to have someone take you home after the test.  What can you expect after a colonoscopy? The nurses will watch you for 1 to 2 hours until the medicines wear off. Then you can go home. You will need a ride. Your doctor will tell you when you can eat and do your usual activities. Your doctor will talk to you about when you will need your next colonoscopy. The results of your test and your risk for colorectal cancer will help your doctor decide how often you need to be checked. Follow-up care is a key part of your treatment and safety. Be sure to make and go to all appointments, and call your doctor if you are having problems. It's also a good idea to know your test results and keep a list of the medicines you take. Where can you learn more? Go to https://SkyGridpeProgrameter.Open Home Pro. org and sign in to your [employer]et[employer]rio[employer] account. Enter K401 in the LiveHive box to learn more about \"Learning About Colonoscopy. \"     If you do not have an account, please click on the \"Sign Up Now\" link. Current as of: May 12, 2017  Content Version: 11.6  © 7278-9346 [employer]. Care instructions adapted under license by [facility] ([facility]). If you have questions about a medical condition or this instruction, always ask your healthcare professional. [place] [address] any warranty or liability for your use of this information. 2 ounces of milk of magnesia three days prior to colonoscopy prep day.

## 2018-07-06 NOTE — PROGRESS NOTES
Name:  Gela Miranda  Age:  57 y.o.   :  1955    Physician: Hakeem Rodriguez MD       Chief Complaint: This 57years old white female is an referred for screening colonoscopy. She had a normal colonoscopy 10 years ago. She has no GI symptoms except for intermittent loose bowel movements and diarrhea for the last few months. She had occasional abdominal cramps. She had a mild stroke in February last year and without significant and residual except for numbness in the left side of the face. She does not smoke. She drinks alcohol socially  She is on aspirin and Plavix. Social History: @  Social History     Social History    Marital status:      Spouse name: N/A    Number of children: N/A    Years of education: N/A     Occupational History    Not on file. Social History Main Topics    Smoking status: Former Smoker     Packs/day: 2.00     Years: 25.00     Types: Cigarettes     Quit date: 2001    Smokeless tobacco: Never Used      Comment: quit in     Alcohol use 0.0 oz/week      Comment: socia, glass of wine at night    Drug use: No    Sexual activity: Not on file     Other Topics Concern    Not on file     Social History Narrative    No narrative on file       Family History: @  Family History   Problem Relation Age of Onset    Cancer Father         metastatic melanoma    Thyroid Disease Mother     Heart Disease Maternal Grandmother     Stroke Maternal Grandmother     Heart Disease Maternal Grandfather     Stroke Maternal Grandfather     Heart Disease Paternal Grandmother     Stroke Paternal Grandmother     Heart Disease Paternal Grandfather     Stroke Paternal Grandfather        Past Medical History:        Diagnosis Date    Cerebrovascular accident (CVA) due to occlusion of left vertebral artery ([facility] [address] 2017    Left medulary stroke.   residual effects:  mild ataxia, left sided paresthesias    Corneal neovascularization     Elevated fasting blood rhythm  Musculoskeletal:  negative  Neurologic:  negative findings: speech normal, mental status intact, no facial asymmetry or weakness. Assessment:  Encounter for screening colonoscopy. Plan:  Scheduled for screening colonoscopy.        1. Encounter for screening colonoscopy          Electronically signed by Jina Cheng MD on 7/6/2018 at 9:31 AM

## 2018-07-06 NOTE — LETTER
COLONOSCOPY             Day Before Examination:        Morning:   Mix bowel prep according to directions and refrigerate. Only CLEAR  LIQUIDS are permitted until midnight. NO FOOD THIS DAY!! Clear liquids including any of the following:   Water   Clear broth or bouillon   7-up, Sprite or Ginger ale   Gatorade or Devon-Aid   Popsicles   Coffee or tea (without milk or sweetener)   Plain Jell-o   NOTHING RED OR PURPLE     At 12 noon take two (2) Dulcolax tablets     Evening: Begin drinking prep at 4:00pm. Drink an 8 ounce glass every 10 minutes. It is  best to drink the whole glass rapidly rather than sipping small amounts. Continue  drinking until the bottle is empty. Bowel movements should begin approximately one(1) hour after the first glass of prep. They will continue periodically one (1) to two (2) hours after drinking the first glass. If you experience bloating, cramping or nausea set the prep aside for 30-40 minutes, the start again. This is temporary and will disappear once bowel movements begin. AMBULATORY SURGERY INSTRUCTIONS    - If you should develop a cold, sore throat, cough, fever or other new indication of illness prior to the scheduled surgery, please notify the [address] [place] [address] office as early as possible. - DO NOT EAT OR DRINK ANYTHING AFTER MIDNIGHT THE NIGHT BEFORE YOUR SURGERY. This includes water, tea, juice, cereal, coffee, etc.    - Refrain from smoking the day of your surgery or procedure. - Wear simple loose clothing, which can be easily changed. - Do not wear any make-up, lipstick or nail polish. - Please leave contact lenses at home or bring container for them. - Leave jewelry (including rings) and other valuables at home. - Make arrangements for a family member or friend to drive you to and from the surgery center.  The anesthetic may affect your judgement following

## 2018-07-10 ENCOUNTER — HOSPITAL ENCOUNTER (OUTPATIENT)
Age: 63
Setting detail: SPECIMEN
Discharge: HOME | End: 2018-07-10
Payer: MEDICARE

## 2018-07-10 DIAGNOSIS — R19.7 DIARRHEA, UNSPECIFIED TYPE: ICD-10-CM

## 2018-07-10 LAB
DIRECT EXAM: NORMAL
Lab: NORMAL
SPECIMEN DESCRIPTION: NORMAL
STATUS: NORMAL

## 2018-07-10 PROCEDURE — 87493 C DIFF AMPLIFIED PROBE: CPT

## 2018-07-10 NOTE — H&P
Name:  Carin Colindres  Age:  57 y.o.   :  1955     Physician: Benson Vincent MD         Chief Complaint: This 57years old white female is an referred for screening colonoscopy. She had a normal colonoscopy 10 years ago. She has no GI symptoms except for intermittent loose bowel movements and diarrhea for the last few months. She had occasional abdominal cramps. She had a mild stroke in February last year and without significant and residual except for numbness in the left side of the face. She does not smoke.   She drinks alcohol socially  She is on aspirin and Plavix.     Social History: @  Social History   Social History            Social History    Marital status:        Spouse name: N/A    Number of children: N/A    Years of education: N/A          Occupational History    Not on file.             Social History Main Topics    Smoking status: Former Smoker       Packs/day: 2.00       Years: 25.00       Types: Cigarettes       Quit date: 2001    Smokeless tobacco: Never Used         Comment: quit in     Alcohol use 0.0 oz/week         Comment: socia, glass of wine at night    Drug use: No    Sexual activity: Not on file           Other Topics Concern    Not on file          Social History Narrative    No narrative on file            Family History: @  Family History         Family History   Problem Relation Age of Onset    Cancer Father           metastatic melanoma    Thyroid Disease Mother      Heart Disease Maternal Grandmother      Stroke Maternal Grandmother      Heart Disease Maternal Grandfather      Stroke Maternal Grandfather      Heart Disease Paternal Grandmother      Stroke Paternal Grandmother      Heart Disease Paternal Grandfather      Stroke Paternal Grandfather              Past Medical History:    Past Medical History             Diagnosis Date    Cerebrovascular accident (CVA) due to occlusion of left vertebral artery ([facility] [address] 2017     Left Normal expansion. Clear to auscultation. No rales, rhonchi, or wheezing. Heart:  Heart sounds are normal.  Regular rate and rhythm without murmur, gallop or rub. Heart regular rate and rhythm  Musculoskeletal:  negative  Neurologic:  negative findings: speech normal, mental status intact, no facial asymmetry or weakness.     Assessment:  Encounter for screening colonoscopy.     Plan:  Scheduled for screening colonoscopy.        1.  Encounter for screening colonoscopy

## 2018-07-11 ENCOUNTER — ANESTHESIA EVENT (OUTPATIENT)
Dept: OPERATING ROOM | Age: 63
End: 2018-07-11
Payer: MEDICARE

## 2018-07-11 ENCOUNTER — HOSPITAL ENCOUNTER (OUTPATIENT)
Age: 63
Setting detail: OUTPATIENT SURGERY
Discharge: HOME | End: 2018-07-11
Attending: SURGERY | Admitting: SURGERY
Payer: MEDICARE

## 2018-07-11 ENCOUNTER — ANESTHESIA (OUTPATIENT)
Dept: OPERATING ROOM | Age: 63
End: 2018-07-11
Payer: MEDICARE

## 2018-07-11 VITALS
DIASTOLIC BLOOD PRESSURE: 68 MMHG | HEART RATE: 83 BPM | BODY MASS INDEX: 27.11 KG/M2 | TEMPERATURE: 97.4 F | OXYGEN SATURATION: 97 % | HEIGHT: 63 IN | SYSTOLIC BLOOD PRESSURE: 140 MMHG | RESPIRATION RATE: 16 BRPM | WEIGHT: 153 LBS

## 2018-07-11 VITALS — SYSTOLIC BLOOD PRESSURE: 116 MMHG | DIASTOLIC BLOOD PRESSURE: 66 MMHG | OXYGEN SATURATION: 99 %

## 2018-07-11 PROCEDURE — G0121 COLON CA SCRN NOT HI RSK IND: HCPCS | Performed by: SURGERY

## 2018-07-11 PROCEDURE — 2580000003 HC RX 258: Performed by: SURGERY

## 2018-07-11 PROCEDURE — 3609027000 HC COLONOSCOPY: Performed by: SURGERY

## 2018-07-11 PROCEDURE — 3700000000 HC ANESTHESIA ATTENDED CARE: Performed by: SURGERY

## 2018-07-11 PROCEDURE — 00812 ANES LWR INTST SCR COLSC: CPT | Performed by: NURSE ANESTHETIST, CERTIFIED REGISTERED

## 2018-07-11 PROCEDURE — 6360000002 HC RX W HCPCS: Performed by: NURSE ANESTHETIST, CERTIFIED REGISTERED

## 2018-07-11 PROCEDURE — 7100000010 HC PHASE II RECOVERY - FIRST 15 MIN: Performed by: SURGERY

## 2018-07-11 PROCEDURE — 3700000001 HC ADD 15 MINUTES (ANESTHESIA): Performed by: SURGERY

## 2018-07-11 PROCEDURE — 7100000011 HC PHASE II RECOVERY - ADDTL 15 MIN: Performed by: SURGERY

## 2018-07-11 RX ORDER — PROPOFOL 10 MG/ML
INJECTION, EMULSION INTRAVENOUS PRN
Status: DISCONTINUED | OUTPATIENT
Start: 2018-07-11 | End: 2018-07-11 | Stop reason: SDUPTHER

## 2018-07-11 RX ORDER — SODIUM CHLORIDE, SODIUM LACTATE, POTASSIUM CHLORIDE, CALCIUM CHLORIDE 600; 310; 30; 20 MG/100ML; MG/100ML; MG/100ML; MG/100ML
INJECTION, SOLUTION INTRAVENOUS CONTINUOUS
Status: DISCONTINUED | OUTPATIENT
Start: 2018-07-11 | End: 2018-07-11 | Stop reason: HOSPADM

## 2018-07-11 RX ADMIN — SODIUM CHLORIDE, POTASSIUM CHLORIDE, SODIUM LACTATE AND CALCIUM CHLORIDE: 600; 310; 30; 20 INJECTION, SOLUTION INTRAVENOUS at 07:43

## 2018-07-11 RX ADMIN — PROPOFOL 250 MG: 10 INJECTION, EMULSION INTRAVENOUS at 08:22

## 2018-07-11 ASSESSMENT — PAIN SCALES - GENERAL
PAINLEVEL_OUTOF10: 0
PAINLEVEL_OUTOF10: 0

## 2018-07-11 ASSESSMENT — PAIN - FUNCTIONAL ASSESSMENT: PAIN_FUNCTIONAL_ASSESSMENT: 0-10

## 2018-07-11 ASSESSMENT — LIFESTYLE VARIABLES: SMOKING_STATUS: 0

## 2018-07-11 NOTE — ANESTHESIA PRE PROCEDURE
Department of Anesthesiology  Preprocedure Note       Name:  Paty Chun   Age:  57 y.o.  :  1955                                          MRN:  [id number]         Date:  2018      Surgeon: Jasbir Marques):  Valentine Mari MD    Procedure: Procedure(s):  COLONOSCOPY    Medications prior to admission:   Prior to Admission medications    Medication Sig Start Date End Date Taking? Authorizing Provider   bisacodyl (DULCOLAX) 5 MG EC tablet Take 1 tablet by mouth daily as needed for Constipation 18  Yes Gio Meyer MD   levothyroxine (SYNTHROID) 75 MCG tablet Take 1 tablet by mouth Daily 18  Yes Simon Castillo DO   atorvastatin (LIPITOR) 40 MG tablet take 1 tablet by mouth at bedtime 18  Yes Simon Castillo DO   Probiotic Product (PROBIOTIC DAILY PO) Take by mouth    Historical Provider, MD   clopidogrel (PLAVIX) 75 MG tablet take 1 tablet by mouth once daily 18   Simon Castillo DO   aspirin 81 MG tablet Take 81 mg by mouth daily    Historical Provider, MD       Current medications:    Current Facility-Administered Medications   Medication Dose Route Frequency Provider Last Rate Last Dose    lactated ringers infusion   Intravenous Continuous Gio Meyer  mL/hr at 18 0743         Allergies:  No Known Allergies    Problem List:    Patient Active Problem List   Diagnosis Code    Hypothyroidism E03.9    Impaired fasting glucose R73.01    Mixed hyperlipidemia E78.2    Gait abnormality R26.9    Cerebrovascular accident (CVA) due to thrombosis of left vertebral artery ([address] I63.012       Past Medical History:        Diagnosis Date    Cerebrovascular accident (CVA) due to occlusion of left vertebral artery ([address] 2017    Left medulary stroke.   residual effects:  mild ataxia, left sided paresthesias    Corneal neovascularization     Elevated fasting blood sugar     Hyperlipidemia     Hypothyroidism     IFG (impaired fasting glucose)     Knee pain

## 2018-07-11 NOTE — PROGRESS NOTES
Stood and amb. without weakness or difficulty. Patient and spouse voice understanding of discharge instructions.

## 2018-07-11 NOTE — ANESTHESIA POSTPROCEDURE EVALUATION
Department of Anesthesiology  Postprocedure Note    Patient: Jelly Bhandari  MRN: [id number]  YOB: 1955  Date of evaluation: 7/11/2018  Time:  8:46 AM     Procedure Summary     Date:  07/11/18 Room / Location:  [address] [facility]    Anesthesia Start:  0820 Anesthesia Stop:  5186    Procedure:  COLONOSCOPY (N/A ) Diagnosis:  (screening )    Surgeon:  Jia De Jesus MD Responsible Provider:  NICKY Pinedo CRNA    Anesthesia Type:  MAC ASA Status:  3          Anesthesia Type: MAC    Kerry Phase I: Kerry Score: 10    Kerry Phase II:      Last vitals: Reviewed and per EMR flowsheets.        Anesthesia Post Evaluation    Patient location during evaluation: bedside  Patient participation: complete - patient participated  Level of consciousness: awake and alert  Pain score: 0  Airway patency: patent  Nausea & Vomiting: no vomiting  Complications: no  Cardiovascular status: hemodynamically stable  Respiratory status: acceptable  Hydration status: euvolemic

## 2018-07-13 ENCOUNTER — TELEPHONE (OUTPATIENT)
Dept: SURGERY | Age: 63
End: 2018-07-13

## 2018-12-05 ENCOUNTER — HOSPITAL ENCOUNTER (OUTPATIENT)
Dept: LAB | Age: 63
Discharge: HOME | End: 2018-12-05
Payer: MEDICARE

## 2018-12-05 DIAGNOSIS — R73.01 IMPAIRED FASTING GLUCOSE: ICD-10-CM

## 2018-12-05 DIAGNOSIS — E03.9 ACQUIRED HYPOTHYROIDISM: ICD-10-CM

## 2018-12-05 DIAGNOSIS — E78.2 MIXED HYPERLIPIDEMIA: ICD-10-CM

## 2018-12-05 LAB
ABSOLUTE EOS #: 0.1 K/UL (ref 0–0.4)
ABSOLUTE IMMATURE GRANULOCYTE: NORMAL K/UL (ref 0–0.3)
ABSOLUTE LYMPH #: 2.1 K/UL (ref 1–4.8)
ABSOLUTE MONO #: 0.4 K/UL (ref 0.1–1.2)
ALBUMIN SERPL-MCNC: 4.9 G/DL (ref 3.5–5.2)
ALBUMIN/GLOBULIN RATIO: 1.9 (ref 1–2.5)
ALP BLD-CCNC: 113 U/L (ref 35–104)
ALT SERPL-CCNC: 25 U/L (ref 5–33)
ANION GAP SERPL CALCULATED.3IONS-SCNC: 12 MMOL/L (ref 9–17)
AST SERPL-CCNC: 20 U/L
BASOPHILS # BLD: 1 % (ref 0–1)
BASOPHILS ABSOLUTE: 0 K/UL (ref 0–0.2)
BILIRUB SERPL-MCNC: 0.9 MG/DL (ref 0.3–1.2)
BUN BLDV-MCNC: 14 MG/DL (ref 8–23)
BUN/CREAT BLD: 22 (ref 9–20)
CALCIUM SERPL-MCNC: 9.9 MG/DL (ref 8.6–10.4)
CHLORIDE BLD-SCNC: 104 MMOL/L (ref 98–107)
CHOLESTEROL/HDL RATIO: 3.1
CHOLESTEROL: 153 MG/DL
CO2: 26 MMOL/L (ref 20–31)
CREAT SERPL-MCNC: 0.65 MG/DL (ref 0.5–0.9)
DIFFERENTIAL TYPE: NORMAL
EOSINOPHILS RELATIVE PERCENT: 3 % (ref 1–7)
ESTIMATED AVERAGE GLUCOSE: 114 MG/DL
GFR AFRICAN AMERICAN: >60 ML/MIN
GFR NON-AFRICAN AMERICAN: >60 ML/MIN
GFR SERPL CREATININE-BSD FRML MDRD: ABNORMAL ML/MIN/{1.73_M2}
GFR SERPL CREATININE-BSD FRML MDRD: ABNORMAL ML/MIN/{1.73_M2}
GLUCOSE BLD-MCNC: 107 MG/DL (ref 70–99)
HBA1C MFR BLD: 5.6 % (ref 4.8–5.9)
HCT VFR BLD CALC: 44.4 % (ref 36–46)
HDLC SERPL-MCNC: 49 MG/DL
HEMOGLOBIN: 14.9 G/DL (ref 12–16)
IMMATURE GRANULOCYTES: NORMAL %
LDL CHOLESTEROL: 70 MG/DL (ref 0–130)
LYMPHOCYTES # BLD: 42 % (ref 16–46)
MCH RBC QN AUTO: 31.4 PG (ref 26–34)
MCHC RBC AUTO-ENTMCNC: 33.6 G/DL (ref 31–37)
MCV RBC AUTO: 93.5 FL (ref 80–100)
MONOCYTES # BLD: 8 % (ref 4–11)
NRBC AUTOMATED: NORMAL PER 100 WBC
PDW BLD-RTO: 12.8 % (ref 11–14.5)
PLATELET # BLD: 197 K/UL (ref 140–450)
PLATELET ESTIMATE: NORMAL
PMV BLD AUTO: 7.9 FL (ref 6–12)
POTASSIUM SERPL-SCNC: 4.5 MMOL/L (ref 3.7–5.3)
RBC # BLD: 4.74 M/UL (ref 4–5.2)
RBC # BLD: NORMAL 10*6/UL
SEG NEUTROPHILS: 46 % (ref 43–77)
SEGMENTED NEUTROPHILS ABSOLUTE COUNT: 2.3 K/UL (ref 1.8–7.7)
SODIUM BLD-SCNC: 142 MMOL/L (ref 135–144)
THYROXINE, FREE: 1.06 NG/DL (ref 0.93–1.7)
TOTAL PROTEIN: 7.5 G/DL (ref 6.4–8.3)
TRIGL SERPL-MCNC: 170 MG/DL
TSH SERPL DL<=0.05 MIU/L-ACNC: 3.39 MIU/L (ref 0.3–5)
VLDLC SERPL CALC-MCNC: ABNORMAL MG/DL (ref 1–30)
WBC # BLD: 4.9 K/UL (ref 3.5–11)
WBC # BLD: NORMAL 10*3/UL

## 2018-12-05 PROCEDURE — 36415 COLL VENOUS BLD VENIPUNCTURE: CPT

## 2018-12-05 PROCEDURE — 84443 ASSAY THYROID STIM HORMONE: CPT

## 2018-12-05 PROCEDURE — 83036 HEMOGLOBIN GLYCOSYLATED A1C: CPT

## 2018-12-05 PROCEDURE — 84439 ASSAY OF FREE THYROXINE: CPT

## 2018-12-05 PROCEDURE — 85025 COMPLETE CBC W/AUTO DIFF WBC: CPT

## 2018-12-05 PROCEDURE — 80053 COMPREHEN METABOLIC PANEL: CPT

## 2018-12-05 PROCEDURE — 80061 LIPID PANEL: CPT

## 2018-12-10 ENCOUNTER — OFFICE VISIT (OUTPATIENT)
Dept: FAMILY MEDICINE CLINIC | Age: 63
End: 2018-12-10
Payer: MEDICARE

## 2018-12-10 VITALS
SYSTOLIC BLOOD PRESSURE: 138 MMHG | DIASTOLIC BLOOD PRESSURE: 86 MMHG | WEIGHT: 157 LBS | HEIGHT: 64 IN | BODY MASS INDEX: 26.8 KG/M2 | HEART RATE: 64 BPM | RESPIRATION RATE: 16 BRPM

## 2018-12-10 DIAGNOSIS — E03.9 ACQUIRED HYPOTHYROIDISM: Primary | ICD-10-CM

## 2018-12-10 DIAGNOSIS — E78.2 MIXED HYPERLIPIDEMIA: ICD-10-CM

## 2018-12-10 DIAGNOSIS — Z12.31 SCREENING MAMMOGRAM, ENCOUNTER FOR: ICD-10-CM

## 2018-12-10 DIAGNOSIS — Z23 NEED FOR PNEUMOCOCCAL VACCINATION: ICD-10-CM

## 2018-12-10 DIAGNOSIS — Z23 NEED FOR INFLUENZA VACCINATION: ICD-10-CM

## 2018-12-10 DIAGNOSIS — I63.012 CEREBROVASCULAR ACCIDENT (CVA) DUE TO THROMBOSIS OF LEFT VERTEBRAL ARTERY (HCC): ICD-10-CM

## 2018-12-10 DIAGNOSIS — R73.01 IMPAIRED FASTING GLUCOSE: ICD-10-CM

## 2018-12-10 PROCEDURE — G8482 FLU IMMUNIZE ORDER/ADMIN: HCPCS | Performed by: FAMILY MEDICINE

## 2018-12-10 PROCEDURE — G8427 DOCREV CUR MEDS BY ELIG CLIN: HCPCS | Performed by: FAMILY MEDICINE

## 2018-12-10 PROCEDURE — 99214 OFFICE O/P EST MOD 30 MIN: CPT | Performed by: FAMILY MEDICINE

## 2018-12-10 PROCEDURE — 1036F TOBACCO NON-USER: CPT | Performed by: FAMILY MEDICINE

## 2018-12-10 PROCEDURE — 1090F PRES/ABSN URINE INCON ASSESS: CPT | Performed by: FAMILY MEDICINE

## 2018-12-10 PROCEDURE — 90670 PCV13 VACCINE IM: CPT | Performed by: FAMILY MEDICINE

## 2018-12-10 PROCEDURE — 4040F PNEUMOC VAC/ADMIN/RCVD: CPT | Performed by: FAMILY MEDICINE

## 2018-12-10 PROCEDURE — G8598 ASA/ANTIPLAT THER USED: HCPCS | Performed by: FAMILY MEDICINE

## 2018-12-10 PROCEDURE — G0009 ADMIN PNEUMOCOCCAL VACCINE: HCPCS | Performed by: FAMILY MEDICINE

## 2018-12-10 PROCEDURE — G0008 ADMIN INFLUENZA VIRUS VAC: HCPCS | Performed by: FAMILY MEDICINE

## 2018-12-10 PROCEDURE — 3017F COLORECTAL CA SCREEN DOC REV: CPT | Performed by: FAMILY MEDICINE

## 2018-12-10 PROCEDURE — G8417 CALC BMI ABV UP PARAM F/U: HCPCS | Performed by: FAMILY MEDICINE

## 2018-12-10 PROCEDURE — 1101F PT FALLS ASSESS-DOCD LE1/YR: CPT | Performed by: FAMILY MEDICINE

## 2018-12-10 PROCEDURE — 1123F ACP DISCUSS/DSCN MKR DOCD: CPT | Performed by: FAMILY MEDICINE

## 2018-12-10 PROCEDURE — G8400 PT W/DXA NO RESULTS DOC: HCPCS | Performed by: FAMILY MEDICINE

## 2018-12-10 PROCEDURE — 90662 IIV NO PRSV INCREASED AG IM: CPT | Performed by: FAMILY MEDICINE

## 2018-12-10 RX ORDER — ATORVASTATIN CALCIUM 40 MG/1
TABLET, FILM COATED ORAL
Qty: 90 TABLET | Refills: 1 | Status: SHIPPED | OUTPATIENT
Start: 2018-12-10 | End: 2019-07-10 | Stop reason: SDUPTHER

## 2018-12-10 RX ORDER — LEVOTHYROXINE SODIUM 0.1 MG/1
100 TABLET ORAL DAILY
Qty: 90 TABLET | Refills: 1 | Status: SHIPPED | OUTPATIENT
Start: 2018-12-10 | End: 2019-07-10 | Stop reason: SDUPTHER

## 2018-12-10 RX ORDER — CLOPIDOGREL BISULFATE 75 MG/1
TABLET ORAL
Qty: 90 TABLET | Refills: 1 | Status: SHIPPED | OUTPATIENT
Start: 2018-12-10 | End: 2019-07-10 | Stop reason: SDUPTHER

## 2018-12-10 ASSESSMENT — PATIENT HEALTH QUESTIONNAIRE - PHQ9
SUM OF ALL RESPONSES TO PHQ QUESTIONS 1-9: 0
2. FEELING DOWN, DEPRESSED OR HOPELESS: 0
SUM OF ALL RESPONSES TO PHQ QUESTIONS 1-9: 0
1. LITTLE INTEREST OR PLEASURE IN DOING THINGS: 0
SUM OF ALL RESPONSES TO PHQ9 QUESTIONS 1 & 2: 0

## 2018-12-10 ASSESSMENT — ENCOUNTER SYMPTOMS
EYE REDNESS: 0
RHINORRHEA: 0
DIARRHEA: 0
COUGH: 0
EYE DISCHARGE: 0
SHORTNESS OF BREATH: 0
TROUBLE SWALLOWING: 0
CONSTIPATION: 0
SINUS PRESSURE: 0
WHEEZING: 0
NAUSEA: 0
ABDOMINAL PAIN: 0
SORE THROAT: 0
VOMITING: 0

## 2019-06-14 ENCOUNTER — HOSPITAL ENCOUNTER (OUTPATIENT)
Dept: MAMMOGRAPHY | Age: 64
Discharge: HOME | End: 2019-06-16
Payer: MEDICARE

## 2019-06-14 DIAGNOSIS — Z12.31 SCREENING MAMMOGRAM, ENCOUNTER FOR: ICD-10-CM

## 2019-06-14 PROCEDURE — 77063 BREAST TOMOSYNTHESIS BI: CPT

## 2019-06-17 ENCOUNTER — TELEPHONE (OUTPATIENT)
Dept: FAMILY MEDICINE CLINIC | Age: 64
End: 2019-06-17

## 2019-07-08 ENCOUNTER — HOSPITAL ENCOUNTER (OUTPATIENT)
Dept: LAB | Age: 64
Discharge: HOME | End: 2019-07-08
Payer: MEDICARE

## 2019-07-08 DIAGNOSIS — R73.01 IMPAIRED FASTING GLUCOSE: ICD-10-CM

## 2019-07-08 DIAGNOSIS — E78.2 MIXED HYPERLIPIDEMIA: ICD-10-CM

## 2019-07-08 DIAGNOSIS — E03.9 ACQUIRED HYPOTHYROIDISM: ICD-10-CM

## 2019-07-08 LAB
ABSOLUTE EOS #: 0.1 K/UL (ref 0–0.4)
ABSOLUTE IMMATURE GRANULOCYTE: NORMAL K/UL (ref 0–0.3)
ABSOLUTE LYMPH #: 1.9 K/UL (ref 1–4.8)
ABSOLUTE MONO #: 0.4 K/UL (ref 0.1–1.2)
ALBUMIN SERPL-MCNC: 4.8 G/DL (ref 3.5–5.2)
ALBUMIN/GLOBULIN RATIO: 2 (ref 1–2.5)
ALP BLD-CCNC: 136 U/L (ref 35–104)
ALT SERPL-CCNC: 24 U/L (ref 5–33)
ANION GAP SERPL CALCULATED.3IONS-SCNC: 12 MMOL/L (ref 9–17)
AST SERPL-CCNC: 19 U/L
BASOPHILS # BLD: 1 % (ref 0–1)
BASOPHILS ABSOLUTE: 0 K/UL (ref 0–0.2)
BILIRUB SERPL-MCNC: 0.81 MG/DL (ref 0.3–1.2)
BUN BLDV-MCNC: 14 MG/DL (ref 8–23)
BUN/CREAT BLD: 23 (ref 9–20)
CALCIUM SERPL-MCNC: 9.9 MG/DL (ref 8.6–10.4)
CHLORIDE BLD-SCNC: 102 MMOL/L (ref 98–107)
CHOLESTEROL/HDL RATIO: 3.1
CHOLESTEROL: 156 MG/DL
CO2: 28 MMOL/L (ref 20–31)
CREAT SERPL-MCNC: 0.62 MG/DL (ref 0.5–0.9)
DIFFERENTIAL TYPE: NORMAL
EOSINOPHILS RELATIVE PERCENT: 3 % (ref 1–7)
ESTIMATED AVERAGE GLUCOSE: 114 MG/DL
GFR AFRICAN AMERICAN: >60 ML/MIN
GFR NON-AFRICAN AMERICAN: >60 ML/MIN
GFR SERPL CREATININE-BSD FRML MDRD: ABNORMAL ML/MIN/{1.73_M2}
GFR SERPL CREATININE-BSD FRML MDRD: ABNORMAL ML/MIN/{1.73_M2}
GLUCOSE BLD-MCNC: 117 MG/DL (ref 70–99)
HBA1C MFR BLD: 5.6 % (ref 4.8–5.9)
HCT VFR BLD CALC: 41.3 % (ref 36–46)
HDLC SERPL-MCNC: 51 MG/DL
HEMOGLOBIN: 13.9 G/DL (ref 12–16)
IMMATURE GRANULOCYTES: NORMAL %
LDL CHOLESTEROL: 71 MG/DL (ref 0–130)
LYMPHOCYTES # BLD: 35 % (ref 16–46)
MCH RBC QN AUTO: 31.9 PG (ref 26–34)
MCHC RBC AUTO-ENTMCNC: 33.7 G/DL (ref 31–37)
MCV RBC AUTO: 94.5 FL (ref 80–100)
MONOCYTES # BLD: 7 % (ref 4–11)
NRBC AUTOMATED: NORMAL PER 100 WBC
PDW BLD-RTO: 12.8 % (ref 11–14.5)
PLATELET # BLD: 227 K/UL (ref 140–450)
PLATELET ESTIMATE: NORMAL
PMV BLD AUTO: 7.6 FL (ref 6–12)
POTASSIUM SERPL-SCNC: 4.7 MMOL/L (ref 3.7–5.3)
RBC # BLD: 4.37 M/UL (ref 4–5.2)
RBC # BLD: NORMAL 10*6/UL
SEG NEUTROPHILS: 54 % (ref 43–77)
SEGMENTED NEUTROPHILS ABSOLUTE COUNT: 2.9 K/UL (ref 1.8–7.7)
SODIUM BLD-SCNC: 142 MMOL/L (ref 135–144)
THYROXINE, FREE: 1.24 NG/DL (ref 0.93–1.7)
TOTAL PROTEIN: 7.2 G/DL (ref 6.4–8.3)
TRIGL SERPL-MCNC: 168 MG/DL
TSH SERPL DL<=0.05 MIU/L-ACNC: 4.57 MIU/L (ref 0.3–5)
VLDLC SERPL CALC-MCNC: ABNORMAL MG/DL (ref 1–30)
WBC # BLD: 5.4 K/UL (ref 3.5–11)
WBC # BLD: NORMAL 10*3/UL

## 2019-07-08 PROCEDURE — 80053 COMPREHEN METABOLIC PANEL: CPT

## 2019-07-08 PROCEDURE — 83036 HEMOGLOBIN GLYCOSYLATED A1C: CPT

## 2019-07-08 PROCEDURE — 80061 LIPID PANEL: CPT

## 2019-07-08 PROCEDURE — 36415 COLL VENOUS BLD VENIPUNCTURE: CPT

## 2019-07-08 PROCEDURE — 85025 COMPLETE CBC W/AUTO DIFF WBC: CPT

## 2019-07-08 PROCEDURE — 84439 ASSAY OF FREE THYROXINE: CPT

## 2019-07-08 PROCEDURE — 84443 ASSAY THYROID STIM HORMONE: CPT

## 2019-07-10 ENCOUNTER — OFFICE VISIT (OUTPATIENT)
Dept: FAMILY MEDICINE CLINIC | Age: 64
End: 2019-07-10
Payer: MEDICARE

## 2019-07-10 VITALS
RESPIRATION RATE: 16 BRPM | SYSTOLIC BLOOD PRESSURE: 130 MMHG | DIASTOLIC BLOOD PRESSURE: 84 MMHG | WEIGHT: 155 LBS | BODY MASS INDEX: 26.46 KG/M2 | HEIGHT: 64 IN | HEART RATE: 72 BPM

## 2019-07-10 DIAGNOSIS — R73.01 IMPAIRED FASTING GLUCOSE: Primary | ICD-10-CM

## 2019-07-10 DIAGNOSIS — E03.9 ACQUIRED HYPOTHYROIDISM: ICD-10-CM

## 2019-07-10 DIAGNOSIS — I63.012 CEREBROVASCULAR ACCIDENT (CVA) DUE TO THROMBOSIS OF LEFT VERTEBRAL ARTERY (HCC): ICD-10-CM

## 2019-07-10 DIAGNOSIS — M79.18 GLUTEAL PAIN: ICD-10-CM

## 2019-07-10 DIAGNOSIS — E78.2 MIXED HYPERLIPIDEMIA: ICD-10-CM

## 2019-07-10 PROCEDURE — 1090F PRES/ABSN URINE INCON ASSESS: CPT | Performed by: FAMILY MEDICINE

## 2019-07-10 PROCEDURE — G8417 CALC BMI ABV UP PARAM F/U: HCPCS | Performed by: FAMILY MEDICINE

## 2019-07-10 PROCEDURE — G8427 DOCREV CUR MEDS BY ELIG CLIN: HCPCS | Performed by: FAMILY MEDICINE

## 2019-07-10 PROCEDURE — 1036F TOBACCO NON-USER: CPT | Performed by: FAMILY MEDICINE

## 2019-07-10 PROCEDURE — G8400 PT W/DXA NO RESULTS DOC: HCPCS | Performed by: FAMILY MEDICINE

## 2019-07-10 PROCEDURE — 1123F ACP DISCUSS/DSCN MKR DOCD: CPT | Performed by: FAMILY MEDICINE

## 2019-07-10 PROCEDURE — 3017F COLORECTAL CA SCREEN DOC REV: CPT | Performed by: FAMILY MEDICINE

## 2019-07-10 PROCEDURE — 4040F PNEUMOC VAC/ADMIN/RCVD: CPT | Performed by: FAMILY MEDICINE

## 2019-07-10 PROCEDURE — 99214 OFFICE O/P EST MOD 30 MIN: CPT | Performed by: FAMILY MEDICINE

## 2019-07-10 PROCEDURE — G8510 SCR DEP NEG, NO PLAN REQD: HCPCS | Performed by: FAMILY MEDICINE

## 2019-07-10 PROCEDURE — 3288F FALL RISK ASSESSMENT DOCD: CPT | Performed by: FAMILY MEDICINE

## 2019-07-10 PROCEDURE — G8598 ASA/ANTIPLAT THER USED: HCPCS | Performed by: FAMILY MEDICINE

## 2019-07-10 RX ORDER — ATORVASTATIN CALCIUM 40 MG/1
TABLET, FILM COATED ORAL
Qty: 90 TABLET | Refills: 1 | Status: SHIPPED | OUTPATIENT
Start: 2019-07-10 | End: 2020-01-10 | Stop reason: SDUPTHER

## 2019-07-10 RX ORDER — LEVOTHYROXINE SODIUM 0.12 MG/1
125 TABLET ORAL DAILY
Qty: 90 TABLET | Refills: 1 | Status: SHIPPED | OUTPATIENT
Start: 2019-07-10 | End: 2020-01-10 | Stop reason: SDUPTHER

## 2019-07-10 RX ORDER — CLOPIDOGREL BISULFATE 75 MG/1
TABLET ORAL
Qty: 90 TABLET | Refills: 1 | Status: SHIPPED | OUTPATIENT
Start: 2019-07-10 | End: 2020-01-10 | Stop reason: SDUPTHER

## 2019-07-10 ASSESSMENT — ENCOUNTER SYMPTOMS
VOMITING: 0
TROUBLE SWALLOWING: 0
EYE REDNESS: 0
SORE THROAT: 0
ABDOMINAL PAIN: 0
DIARRHEA: 0
EYE DISCHARGE: 0
NAUSEA: 0
CONSTIPATION: 0
SHORTNESS OF BREATH: 0
SINUS PRESSURE: 0
RHINORRHEA: 0
COUGH: 0
WHEEZING: 0

## 2019-07-10 ASSESSMENT — PATIENT HEALTH QUESTIONNAIRE - PHQ9
SUM OF ALL RESPONSES TO PHQ QUESTIONS 1-9: 0
SUM OF ALL RESPONSES TO PHQ QUESTIONS 1-9: 0
1. LITTLE INTEREST OR PLEASURE IN DOING THINGS: 0
SUM OF ALL RESPONSES TO PHQ9 QUESTIONS 1 & 2: 0
2. FEELING DOWN, DEPRESSED OR HOPELESS: 0

## 2019-07-10 NOTE — PROGRESS NOTES
tenderness. She exhibits no edema. Pain with palpation to the left gluteal region. Has normal range of motion with straight leg raise internal and external rotation of the hip. Has some tightness noted to the musculature of the left gluteal region when compared to the right. Lymphadenopathy:     She has no cervical adenopathy. Neurological: She is alert and oriented to person, place, and time. Skin: Skin is warm and dry. No rash noted. She is not diaphoretic. Psychiatric: She has a normal mood and affect. Her behavior is normal. Judgment and thought content normal.   Nursing note and vitals reviewed. ASSESSMENT/PLAN:  Encounter Diagnoses   Name Primary?     Impaired fasting glucose Yes    Acquired hypothyroidism     Mixed hyperlipidemia     Cerebrovascular accident (CVA) due to thrombosis of left vertebral artery (HCC)     Gluteal pain      Orders Placed This Encounter   Medications    clopidogrel (PLAVIX) 75 MG tablet     Sig: take 1 tablet by mouth once daily     Dispense:  90 tablet     Refill:  1    atorvastatin (LIPITOR) 40 MG tablet     Sig: take 1 tablet by mouth at bedtime     Dispense:  90 tablet     Refill:  1    levothyroxine (SYNTHROID) 125 MCG tablet     Sig: Take 1 tablet by mouth Daily     Dispense:  90 tablet     Refill:  1     Orders Placed This Encounter   Procedures    CBC Auto Differential     To be done in 6 months     Standing Status:   Future     Standing Expiration Date:   7/9/2020    Comprehensive Metabolic Panel     To be done in 6 months     Standing Status:   Future     Standing Expiration Date:   7/9/2020    Lipid Panel     To be done in 6 months     Standing Status:   Future     Standing Expiration Date:   7/9/2020     Order Specific Question:   Is Patient Fasting?/# of Hours     Answer:   12 hours    Hemoglobin A1C     To be done in 6 months     Standing Status:   Future     Standing Expiration Date:   7/9/2020    TSH without Reflex     To be done in 6

## 2019-09-06 ENCOUNTER — OFFICE VISIT (OUTPATIENT)
Dept: PRIMARY CARE CLINIC | Age: 64
End: 2019-09-06
Payer: MEDICARE

## 2019-09-06 ENCOUNTER — HOSPITAL ENCOUNTER (OUTPATIENT)
Dept: GENERAL RADIOLOGY | Age: 64
Discharge: HOME | End: 2019-09-08
Payer: MEDICARE

## 2019-09-06 VITALS
OXYGEN SATURATION: 98 % | HEIGHT: 63 IN | HEART RATE: 93 BPM | BODY MASS INDEX: 26.79 KG/M2 | DIASTOLIC BLOOD PRESSURE: 68 MMHG | SYSTOLIC BLOOD PRESSURE: 120 MMHG | WEIGHT: 151.2 LBS

## 2019-09-06 DIAGNOSIS — M79.671 RIGHT FOOT PAIN: ICD-10-CM

## 2019-09-06 DIAGNOSIS — S93.601A SPRAIN OF RIGHT FOOT, INITIAL ENCOUNTER: Primary | ICD-10-CM

## 2019-09-06 PROCEDURE — 1036F TOBACCO NON-USER: CPT | Performed by: FAMILY MEDICINE

## 2019-09-06 PROCEDURE — 1090F PRES/ABSN URINE INCON ASSESS: CPT | Performed by: FAMILY MEDICINE

## 2019-09-06 PROCEDURE — 99213 OFFICE O/P EST LOW 20 MIN: CPT | Performed by: FAMILY MEDICINE

## 2019-09-06 PROCEDURE — G8417 CALC BMI ABV UP PARAM F/U: HCPCS | Performed by: FAMILY MEDICINE

## 2019-09-06 PROCEDURE — G8598 ASA/ANTIPLAT THER USED: HCPCS | Performed by: FAMILY MEDICINE

## 2019-09-06 PROCEDURE — 1123F ACP DISCUSS/DSCN MKR DOCD: CPT | Performed by: FAMILY MEDICINE

## 2019-09-06 PROCEDURE — G8427 DOCREV CUR MEDS BY ELIG CLIN: HCPCS | Performed by: FAMILY MEDICINE

## 2019-09-06 PROCEDURE — 3017F COLORECTAL CA SCREEN DOC REV: CPT | Performed by: FAMILY MEDICINE

## 2019-09-06 PROCEDURE — 73630 X-RAY EXAM OF FOOT: CPT

## 2019-09-06 PROCEDURE — 4040F PNEUMOC VAC/ADMIN/RCVD: CPT | Performed by: FAMILY MEDICINE

## 2019-09-06 PROCEDURE — G8400 PT W/DXA NO RESULTS DOC: HCPCS | Performed by: FAMILY MEDICINE

## 2020-01-09 ENCOUNTER — HOSPITAL ENCOUNTER (OUTPATIENT)
Dept: LAB | Age: 65
Discharge: HOME | End: 2020-01-09
Payer: MEDICARE

## 2020-01-09 LAB
ABSOLUTE EOS #: 0.15 K/UL (ref 0–0.44)
ABSOLUTE IMMATURE GRANULOCYTE: <0.03 K/UL (ref 0–0.3)
ABSOLUTE LYMPH #: 1.68 K/UL (ref 1.1–3.7)
ABSOLUTE MONO #: 0.28 K/UL (ref 0.1–1.2)
ALBUMIN SERPL-MCNC: 4.7 G/DL (ref 3.5–5.2)
ALBUMIN/GLOBULIN RATIO: 1.8 (ref 1–2.5)
ALP BLD-CCNC: 134 U/L (ref 35–104)
ALT SERPL-CCNC: 26 U/L (ref 5–33)
ANION GAP SERPL CALCULATED.3IONS-SCNC: 12 MMOL/L (ref 9–17)
AST SERPL-CCNC: 18 U/L
BASOPHILS # BLD: 1 % (ref 0–2)
BASOPHILS ABSOLUTE: <0.03 K/UL (ref 0–0.2)
BILIRUB SERPL-MCNC: 0.5 MG/DL (ref 0.3–1.2)
BUN BLDV-MCNC: 17 MG/DL (ref 8–23)
BUN/CREAT BLD: 31 (ref 9–20)
CALCIUM SERPL-MCNC: 9.2 MG/DL (ref 8.6–10.4)
CHLORIDE BLD-SCNC: 106 MMOL/L (ref 98–107)
CHOLESTEROL/HDL RATIO: 3
CHOLESTEROL: 139 MG/DL
CO2: 25 MMOL/L (ref 20–31)
CREAT SERPL-MCNC: 0.54 MG/DL (ref 0.5–0.9)
DIFFERENTIAL TYPE: ABNORMAL
EOSINOPHILS RELATIVE PERCENT: 4 % (ref 1–4)
ESTIMATED AVERAGE GLUCOSE: 117 MG/DL
GFR AFRICAN AMERICAN: >60 ML/MIN
GFR NON-AFRICAN AMERICAN: >60 ML/MIN
GFR SERPL CREATININE-BSD FRML MDRD: ABNORMAL ML/MIN/{1.73_M2}
GFR SERPL CREATININE-BSD FRML MDRD: ABNORMAL ML/MIN/{1.73_M2}
GLUCOSE BLD-MCNC: 106 MG/DL (ref 70–99)
HBA1C MFR BLD: 5.7 % (ref 4.8–5.9)
HCT VFR BLD CALC: 42.8 % (ref 36.3–47.1)
HDLC SERPL-MCNC: 46 MG/DL
HEMOGLOBIN: 14 G/DL (ref 11.9–15.1)
IMMATURE GRANULOCYTES: 0 %
LDL CHOLESTEROL: 50 MG/DL (ref 0–130)
LYMPHOCYTES # BLD: 48 % (ref 24–43)
MCH RBC QN AUTO: 30.2 PG (ref 25.2–33.5)
MCHC RBC AUTO-ENTMCNC: 32.7 G/DL (ref 25.2–33.5)
MCV RBC AUTO: 92.4 FL (ref 82.6–102.9)
MONOCYTES # BLD: 8 % (ref 3–12)
NRBC AUTOMATED: 0 PER 100 WBC
PDW BLD-RTO: 11.9 % (ref 11.8–14.4)
PLATELET # BLD: 207 K/UL (ref 138–453)
PLATELET ESTIMATE: ABNORMAL
PMV BLD AUTO: 9.4 FL (ref 8.1–13.5)
POTASSIUM SERPL-SCNC: 4 MMOL/L (ref 3.7–5.3)
RBC # BLD: 4.63 M/UL (ref 3.95–5.11)
RBC # BLD: ABNORMAL 10*6/UL
SEG NEUTROPHILS: 39 % (ref 36–65)
SEGMENTED NEUTROPHILS ABSOLUTE COUNT: 1.38 K/UL (ref 1.5–8.1)
SODIUM BLD-SCNC: 143 MMOL/L (ref 135–144)
THYROXINE, FREE: 1.34 NG/DL (ref 0.93–1.7)
TOTAL PROTEIN: 7.3 G/DL (ref 6.4–8.3)
TRIGL SERPL-MCNC: 214 MG/DL
TSH SERPL DL<=0.05 MIU/L-ACNC: 1.16 MIU/L (ref 0.3–5)
VLDLC SERPL CALC-MCNC: ABNORMAL MG/DL (ref 1–30)
WBC # BLD: 3.5 K/UL (ref 3.5–11.3)
WBC # BLD: ABNORMAL 10*3/UL

## 2020-01-09 PROCEDURE — 80061 LIPID PANEL: CPT

## 2020-01-09 PROCEDURE — 85025 COMPLETE CBC W/AUTO DIFF WBC: CPT

## 2020-01-09 PROCEDURE — 36415 COLL VENOUS BLD VENIPUNCTURE: CPT

## 2020-01-09 PROCEDURE — 84443 ASSAY THYROID STIM HORMONE: CPT

## 2020-01-09 PROCEDURE — 83036 HEMOGLOBIN GLYCOSYLATED A1C: CPT

## 2020-01-09 PROCEDURE — 84439 ASSAY OF FREE THYROXINE: CPT

## 2020-01-09 PROCEDURE — 80053 COMPREHEN METABOLIC PANEL: CPT

## 2020-01-10 ENCOUNTER — OFFICE VISIT (OUTPATIENT)
Dept: FAMILY MEDICINE CLINIC | Age: 65
End: 2020-01-10
Payer: MEDICARE

## 2020-01-10 VITALS
HEIGHT: 63 IN | SYSTOLIC BLOOD PRESSURE: 130 MMHG | RESPIRATION RATE: 16 BRPM | WEIGHT: 150 LBS | DIASTOLIC BLOOD PRESSURE: 76 MMHG | HEART RATE: 80 BPM | BODY MASS INDEX: 26.58 KG/M2

## 2020-01-10 PROCEDURE — 99213 OFFICE O/P EST LOW 20 MIN: CPT | Performed by: FAMILY MEDICINE

## 2020-01-10 PROCEDURE — 3017F COLORECTAL CA SCREEN DOC REV: CPT | Performed by: FAMILY MEDICINE

## 2020-01-10 PROCEDURE — 1123F ACP DISCUSS/DSCN MKR DOCD: CPT | Performed by: FAMILY MEDICINE

## 2020-01-10 PROCEDURE — G8400 PT W/DXA NO RESULTS DOC: HCPCS | Performed by: FAMILY MEDICINE

## 2020-01-10 PROCEDURE — G0438 PPPS, INITIAL VISIT: HCPCS | Performed by: FAMILY MEDICINE

## 2020-01-10 PROCEDURE — 1036F TOBACCO NON-USER: CPT | Performed by: FAMILY MEDICINE

## 2020-01-10 PROCEDURE — 99212 OFFICE O/P EST SF 10 MIN: CPT

## 2020-01-10 PROCEDURE — 4040F PNEUMOC VAC/ADMIN/RCVD: CPT | Performed by: FAMILY MEDICINE

## 2020-01-10 PROCEDURE — G0446 INTENS BEHAVE THER CARDIO DX: HCPCS | Performed by: FAMILY MEDICINE

## 2020-01-10 PROCEDURE — G8417 CALC BMI ABV UP PARAM F/U: HCPCS | Performed by: FAMILY MEDICINE

## 2020-01-10 PROCEDURE — G8427 DOCREV CUR MEDS BY ELIG CLIN: HCPCS | Performed by: FAMILY MEDICINE

## 2020-01-10 PROCEDURE — G0009 ADMIN PNEUMOCOCCAL VACCINE: HCPCS | Performed by: FAMILY MEDICINE

## 2020-01-10 PROCEDURE — 1090F PRES/ABSN URINE INCON ASSESS: CPT | Performed by: FAMILY MEDICINE

## 2020-01-10 PROCEDURE — 99397 PER PM REEVAL EST PAT 65+ YR: CPT | Performed by: FAMILY MEDICINE

## 2020-01-10 PROCEDURE — G8484 FLU IMMUNIZE NO ADMIN: HCPCS | Performed by: FAMILY MEDICINE

## 2020-01-10 RX ORDER — LEVOTHYROXINE SODIUM 0.12 MG/1
125 TABLET ORAL DAILY
Qty: 90 TABLET | Refills: 1 | Status: SHIPPED | OUTPATIENT
Start: 2020-01-10 | End: 2020-07-14 | Stop reason: SDUPTHER

## 2020-01-10 RX ORDER — ATORVASTATIN CALCIUM 40 MG/1
TABLET, FILM COATED ORAL
Qty: 90 TABLET | Refills: 1 | Status: SHIPPED | OUTPATIENT
Start: 2020-01-10 | End: 2020-07-14 | Stop reason: SDUPTHER

## 2020-01-10 RX ORDER — CLOPIDOGREL BISULFATE 75 MG/1
TABLET ORAL
Qty: 90 TABLET | Refills: 1 | Status: SHIPPED | OUTPATIENT
Start: 2020-01-10 | End: 2020-07-14 | Stop reason: SDUPTHER

## 2020-01-10 ASSESSMENT — PATIENT HEALTH QUESTIONNAIRE - PHQ9
SUM OF ALL RESPONSES TO PHQ QUESTIONS 1-9: 0
SUM OF ALL RESPONSES TO PHQ QUESTIONS 1-9: 0

## 2020-01-10 ASSESSMENT — LIFESTYLE VARIABLES
HOW OFTEN DURING THE LAST YEAR HAVE YOU HAD A FEELING OF GUILT OR REMORSE AFTER DRINKING: 0
HOW OFTEN DO YOU HAVE A DRINK CONTAINING ALCOHOL: 4
HOW OFTEN DO YOU HAVE SIX OR MORE DRINKS ON ONE OCCASION: 0
HOW OFTEN DURING THE LAST YEAR HAVE YOU BEEN UNABLE TO REMEMBER WHAT HAPPENED THE NIGHT BEFORE BECAUSE YOU HAD BEEN DRINKING: 0
HOW OFTEN DURING THE LAST YEAR HAVE YOU FAILED TO DO WHAT WAS NORMALLY EXPECTED FROM YOU BECAUSE OF DRINKING: 0
HOW OFTEN DURING THE LAST YEAR HAVE YOU FOUND THAT YOU WERE NOT ABLE TO STOP DRINKING ONCE YOU HAD STARTED: 0
HOW OFTEN DURING THE LAST YEAR HAVE YOU NEEDED AN ALCOHOLIC DRINK FIRST THING IN THE MORNING TO GET YOURSELF GOING AFTER A NIGHT OF HEAVY DRINKING: 0
HAVE YOU OR SOMEONE ELSE BEEN INJURED AS A RESULT OF YOUR DRINKING: 0
HAS A RELATIVE, FRIEND, DOCTOR, OR ANOTHER HEALTH PROFESSIONAL EXPRESSED CONCERN ABOUT YOUR DRINKING OR SUGGESTED YOU CUT DOWN: 0
AUDIT-C TOTAL SCORE: 4
AUDIT TOTAL SCORE: 4
HOW MANY STANDARD DRINKS CONTAINING ALCOHOL DO YOU HAVE ON A TYPICAL DAY: 0

## 2020-01-10 NOTE — PROGRESS NOTES
Patient declines flu and pneumonia vaccines. Will discuss shingles vaccine with physician and decide if willing to proceed. Declines dexa screening.

## 2020-01-10 NOTE — PATIENT INSTRUCTIONS
Final    Alb 01/09/2020 4.7  3.5 - 5.2 g/dL Final    Albumin/Globulin Ratio 01/09/2020 1.8  1.0 - 2.5 Final    GFR Non- 01/09/2020 >60  >60 mL/min Final    GFR  01/09/2020 >60  >60 mL/min Final    GFR Comment 01/09/2020        Final    Comment: Average GFR for 61-76 years old:   80 mL/min/1.73sq m  Chronic Kidney Disease:   <60 mL/min/1.73sq m  Kidney failure:   <15 mL/min/1.73sq m              eGFR calculated using average adult body mass.  Additional eGFR calculator available at:        [email or web address]            GFR Staging 01/09/2020 NOT REPORTED   Final    WBC 01/09/2020 3.5  3.5 - 11.3 k/uL Final    RBC 01/09/2020 4.63  3.95 - 5.11 m/uL Final    Hemoglobin 01/09/2020 14.0  11.9 - 15.1 g/dL Final    Hematocrit 01/09/2020 42.8  36.3 - 47.1 % Final    MCV 01/09/2020 92.4  82.6 - 102.9 fL Final    MCH 01/09/2020 30.2  25.2 - 33.5 pg Final    MCHC 01/09/2020 32.7  25.2 - 33.5 g/dL Final    RDW 01/09/2020 11.9  11.8 - 14.4 % Final    Platelets 21/54/5344 207  138 - 453 k/uL Final    MPV 01/09/2020 9.4  8.1 - 13.5 fL Final    NRBC Automated 01/09/2020 0.0  0.0 per 100 WBC Final    Differential Type 01/09/2020 NOT REPORTED   Final    WBC Morphology 01/09/2020 NOT REPORTED   Final    RBC Morphology 01/09/2020 NOT REPORTED   Final    Platelet Estimate 13/71/8056 NOT REPORTED   Final    Seg Neutrophils 01/09/2020 39  36 - 65 % Final    Lymphocytes 01/09/2020 48* 24 - 43 % Final    Monocytes 01/09/2020 8  3 - 12 % Final    Eosinophils % 01/09/2020 4  1 - 4 % Final    Basophils 01/09/2020 1  0 - 2 % Final    Immature Granulocytes 01/09/2020 0  0 % Final    Segs Absolute 01/09/2020 1.38* 1.50 - 8.10 k/uL Final    Absolute Lymph # 01/09/2020 1.68  1.10 - 3.70 k/uL Final    Absolute Mono # 01/09/2020 0.28  0.10 - 1.20 k/uL Final    Absolute Eos # 01/09/2020 0.15  0.00 - 0.44 k/uL Final    Basophils Absolute 01/09/2020 <0.03  0.00 - 0.20 k/uL Final    Absolute Immature Granulocyte 01/09/2020 <0.03  0.00 - 0.30 k/uL Final       Personalized Preventive Plan for Bonita Alexander - 1/10/2020  Medicare offers a range of preventive health benefits. Some of the tests and screenings are paid in full while other may be subject to a deductible, co-insurance, and/or copay. Some of these benefits include a comprehensive review of your medical history including lifestyle, illnesses that may run in your family, and various assessments and screenings as appropriate. After reviewing your medical record and screening and assessments performed today your provider may have ordered immunizations, labs, imaging, and/or referrals for you. A list of these orders (if applicable) as well as your Preventive Care list are included within your After Visit Summary for your review. Other Preventive Recommendations:    · A preventive eye exam performed by an eye specialist is recommended every 1-2 years to screen for glaucoma; cataracts, macular degeneration, and other eye disorders. · A preventive dental visit is recommended every 6 months. · Try to get at least 150 minutes of exercise per week or 10,000 steps per day on a pedometer . · Order or download the FREE \"Exercise & Physical Activity: Your Everyday Guide\" from [employer]. Call 3-591.312.5214 or search [employer] online. · You need 6171-1438 mg of calcium and 3160-7433 IU of vitamin D per day. It is possible to meet your calcium requirement with diet alone, but a vitamin D supplement is usually necessary to meet this goal.  · When exposed to the sun, use a sunscreen that protects against both UVA and UVB radiation with an SPF of 30 or greater. Reapply every 2 to 3 hours or after sweating, drying off with a towel, or swimming. · Always wear a seat belt when traveling in a car. Always wear a helmet when riding a bicycle or motorcycle.

## 2020-01-10 NOTE — PROGRESS NOTES
SPEC WHEN PFRMD N/A 7/11/2018    COLONOSCOPY performed by Scot Webb MD at [address] [facility]         Family History   Problem Relation Age of Onset    Cancer Father         metastatic melanoma    Thyroid Disease Mother     Heart Disease Maternal Grandmother     Stroke Maternal Grandmother     Heart Disease Maternal Grandfather     Stroke Maternal Grandfather     Heart Disease Paternal Grandmother     Stroke Paternal Grandmother     Heart Disease Paternal Grandfather     Stroke Paternal Grandfather        CareTeam (Including outside providers/suppliers regularly involved in providing care):   Patient Care Team:  Rich Borrero DO as PCP - General (Family Medicine)  Rich Borrero DO as PCP - [facility] Empaneled Provider  Gurjit Cheney, Cardiologist who is monitoring loop recorder    Wt Readings from Last 3 Encounters:   01/10/20 150 lb (68 kg)   09/06/19 151 lb 3.2 oz (68.6 kg)   07/10/19 155 lb (70.3 kg)     Vitals:    01/10/20 1457   BP: 130/76   Site: Left Upper Arm   Position: Sitting   Cuff Size: Medium Adult   Pulse: 80   Resp: 16   Weight: 150 lb (68 kg)   Height: 5' 3\" (1.6 m)     Body mass index is 26.57 kg/m². Based upon direct observation of the patient, evaluation of cognition reveals recent and remote memory intact. Patient's complete Health Risk Assessment and screening values have been reviewed and are found in Flowsheets. The following problems were reviewed today and where indicated follow up appointments were made and/or referrals ordered. Positive Risk Factor Screenings with Interventions:     General Health:  General  In general, how would you say your health is?: Very Good  In the past 7 days, have you experienced any of the following?  New or Increased Pain, New or Increased Fatigue, Loneliness, Social Isolation, Stress or Anger?: (!) New or Increased Pain(over the past 6 months; hip/gluteal pain-movement helps)  Do you get the social and emotional support that you need?: Yes  Do you have a Living Will?: (!) No  General Health Risk Interventions:  · Pain issues: doing xrays for gluteal and hip pain  · No Living Will: additional information provided  Encouraged to consider    Health Habits/Nutrition:  Health Habits/Nutrition  Do you exercise for at least 20 minutes 2-3 times per week?: (!) No  Have you lost any weight without trying in the past 3 months?: No  Do you eat fewer than 2 meals per day?: No  Have you seen a dentist within the past year?: (!) No  Body mass index is 26.57 kg/m². Health Habits/Nutrition Interventions:  · Inadequate physical activity:  patient agrees to increase physical activity as follows: encouraged increased exercise daily  · Dental exam overdue:  patient encouraged to make appointment with his/her dentist    Personalized Preventive Plan   Current Health Maintenance Status  Immunization History   Administered Date(s) Administered    Influenza, High Dose (Fluzone 65 yrs and older) 12/10/2018    Influenza, Quadv, IM, PF (6 mo and older Fluzone, Flulaval, Fluarix, and 3 yrs and older Afluria) 12/07/2017    Pneumococcal Conjugate 13-valent ([place] Luis) 12/10/2018    Tdap (Boostrix, Adacel) 06/09/2017        Health Maintenance   Topic Date Due    Shingles Vaccine (1 of 2) Recommended, patient declined      DEXA (modify frequency per FRAX score)  Recommended, patient declined      Annual Wellness Visit (AWV)  Done today    Flu vaccine (1) Recommended, patient declined      Pneumococcal 65+ years Vaccine (2 of 2 - PPSV23) Given today    A1C test (Diabetic or Prediabetic)  01/09/2021    Lipid screen  01/09/2021    TSH testing  01/09/2021    Breast cancer screen  06/14/2021    DTaP/Tdap/Td vaccine (2 - Td) 06/09/2027    Colon cancer screen colonoscopy  07/11/2028    Hepatitis C screen  Completed     Recommendations for Preventive Services Due: see orders and patient instructions/AVS.  .   Recommended screening schedule for the next

## 2020-01-12 ASSESSMENT — ENCOUNTER SYMPTOMS
SHORTNESS OF BREATH: 0
WHEEZING: 0
NAUSEA: 0
EYE REDNESS: 0
TROUBLE SWALLOWING: 0
SINUS PRESSURE: 0
ABDOMINAL PAIN: 0
SORE THROAT: 0
DIARRHEA: 0
EYE DISCHARGE: 0
CONSTIPATION: 0
COUGH: 0
RHINORRHEA: 0
VOMITING: 0

## 2020-01-13 NOTE — PROGRESS NOTES
follow-up. She called back to ask previously if she should follow-up with her cardiologist but they had recommended that no further follow-up was needed other than the fact that they would continue to monitor and let her know if she had any issues. By June it will have been 3 years since it had been placed I recommend at that time considering getting it removed. If there is any issues from an aortic valve standpoint she can have that addressed at that time as well when she follows up with the cardiologist.  She does seem agreeable with this plan. Patient otherwise has no other acute medical concerns. .  Patient's recent lab reports are as follows:    Results for orders placed or performed during the hospital encounter of 01/09/20   T4, Free   Result Value Ref Range    Thyroxine, Free 1.34 0.93 - 1.70 ng/dL   TSH without Reflex   Result Value Ref Range    TSH 1.16 0.30 - 5.00 mIU/L   Hemoglobin A1C   Result Value Ref Range    Hemoglobin A1C 5.7 4.8 - 5.9 %    Estimated Avg Glucose 117 mg/dL   Lipid Panel   Result Value Ref Range    Cholesterol 139 <200 mg/dL    HDL 46 >40 mg/dL    LDL Cholesterol 50 0 - 130 mg/dL    Chol/HDL Ratio 3.0 <5    Triglycerides 214 (H) <150 mg/dL    VLDL NOT REPORTED (H) 1 - 30 mg/dL   Comprehensive Metabolic Panel   Result Value Ref Range    Glucose 106 (H) 70 - 99 mg/dL    BUN 17 8 - 23 mg/dL    CREATININE 0.54 0.50 - 0.90 mg/dL    Bun/Cre Ratio 31 (H) 9 - 20    Calcium 9.2 8.6 - 10.4 mg/dL    Sodium 143 135 - 144 mmol/L    Potassium 4.0 3.7 - 5.3 mmol/L    Chloride 106 98 - 107 mmol/L    CO2 25 20 - 31 mmol/L    Anion Gap 12 9 - 17 mmol/L    Alkaline Phosphatase 134 (H) 35 - 104 U/L    ALT 26 5 - 33 U/L    AST 18 <32 U/L    Total Bilirubin 0.50 0.3 - 1.2 mg/dL    Total Protein 7.3 6.4 - 8.3 g/dL    Alb 4.7 3.5 - 5.2 g/dL    Albumin/Globulin Ratio 1.8 1.0 - 2.5    GFR Non-African American >60 >60 mL/min    GFR African American >60 >60 mL/min    GFR Comment          GFR Staging NOT Negative for environmental allergies. Neurological: Negative for dizziness, weakness, light-headedness and headaches. Hematological: Negative for adenopathy. Psychiatric/Behavioral: Negative. Prior to Visit Medications    Medication Sig Taking? Authorizing Provider   clopidogrel (PLAVIX) 75 MG tablet take 1 tablet by mouth once daily Yes Buck Erazo, DO   atorvastatin (LIPITOR) 40 MG tablet take 1 tablet by mouth at bedtime Yes Buck Erazo, DO   levothyroxine (SYNTHROID) 125 MCG tablet Take 1 tablet by mouth Daily Yes Buck Erazo, DO   Probiotic Product (PROBIOTIC DAILY PO) Take by mouth Yes Historical Provider, MD   aspirin 81 MG tablet Take 81 mg by mouth daily Yes Historical Provider, MD        Social History     Tobacco Use    Smoking status: Former Smoker     Packs/day: 2.00     Years: 25.00     Pack years: 50.00     Types: Cigarettes     Last attempt to quit: 2001     Years since quittin.6    Smokeless tobacco: Never Used    Tobacco comment: quit in    Substance Use Topics    Alcohol use: Yes     Alcohol/week: 0.0 standard drinks     Comment: socia, glass of wine at night        Vitals:    01/10/20 1457   BP: 130/76   Site: Left Upper Arm   Position: Sitting   Cuff Size: Medium Adult   Pulse: 80   Resp: 16   Weight: 150 lb (68 kg)   Height: 5' 3\" (1.6 m)     Estimated body mass index is 26.57 kg/m² as calculated from the following:    Height as of this encounter: 5' 3\" (1.6 m). Weight as of this encounter: 150 lb (68 kg). Physical Exam  Vitals signs and nursing note reviewed. Constitutional:       General: She is not in acute distress. Appearance: Normal appearance. She is well-developed. She is not diaphoretic. HENT:      Head: Normocephalic and atraumatic.       Right Ear: Tympanic membrane, ear canal and external ear normal.      Left Ear: Tympanic membrane, ear canal and external ear normal.      Nose: Nose normal.      Mouth/Throat:      Mouth: Mucous membranes are moist.      Pharynx: Oropharynx is clear. No oropharyngeal exudate. Eyes:      General:         Right eye: No discharge. Left eye: No discharge. Conjunctiva/sclera: Conjunctivae normal.      Pupils: Pupils are equal, round, and reactive to light. Neck:      Musculoskeletal: Normal range of motion and neck supple. Thyroid: No thyromegaly. Cardiovascular:      Rate and Rhythm: Normal rate and regular rhythm. Heart sounds: Normal heart sounds. Pulmonary:      Effort: Pulmonary effort is normal.      Breath sounds: Normal breath sounds. No wheezing or rales. Abdominal:      General: Bowel sounds are normal. There is no distension. Palpations: Abdomen is soft. Tenderness: There is no tenderness. Musculoskeletal: Normal range of motion. General: Tenderness present. Comments: Reproducible pain with palpation to the bilateral gluteal region and over the ischial tuberosities bilaterally. Lymphadenopathy:      Cervical: No cervical adenopathy. Skin:     General: Skin is warm and dry. Findings: No rash. Neurological:      Mental Status: She is alert and oriented to person, place, and time. Psychiatric:         Behavior: Behavior normal.         Thought Content: Thought content normal.         Judgment: Judgment normal.         ASSESSMENT/PLAN:  Encounter Diagnoses   Name Primary?     Pain of both hip joints Yes    Gluteal pain     Moderate aortic regurgitation     Impaired fasting glucose     Acquired hypothyroidism     Mixed hyperlipidemia     Screening mammogram, encounter for     Screening for cardiovascular condition     Routine general medical examination at a health care facility     Need for 23-polyvalent pneumococcal polysaccharide vaccine      Orders Placed This Encounter   Medications    clopidogrel (PLAVIX) 75 MG tablet     Sig: take 1 tablet by mouth once daily     Dispense:  90 tablet     Refill:  1    atorvastatin (LIPITOR) 40 MG tablet     Sig: take 1 tablet by mouth at bedtime     Dispense:  90 tablet     Refill:  1    levothyroxine (SYNTHROID) 125 MCG tablet     Sig: Take 1 tablet by mouth Daily     Dispense:  90 tablet     Refill:  1     Orders Placed This Encounter   Procedures    DANIELITO DIGITAL SCREEN W CAD BILATERAL     Standing Status:   Future     Standing Expiration Date:   1/9/2021     Scheduling Instructions:      Prior to the exam, the patient should request any previous mammogram films from other organizations. On the day of the exam, the patient should not wear powder, perfume, lotions, or underarm deodorant to the breast or underarm area. Please wear a two piece outfit and be prepared to give information about prior breast procedures including mammograms, biopsies, or surgeries.      Order Specific Question:   Reason for exam:     Answer:   screening    XR LUMBAR SPINE (2-3 VIEWS)     Standing Status:   Future     Standing Expiration Date:   1/10/2021     Order Specific Question:   Reason for exam:     Answer:   low back pain    XR HIP BILATERAL W AP PELVIS (2 VIEWS)     Standing Status:   Future     Standing Expiration Date:   1/9/2021     Order Specific Question:   Reason for exam:     Answer:   hip pain    XR SACRUM COCCYX (MIN 2 VIEWS)     Standing Status:   Future     Standing Expiration Date:   1/10/2021     Order Specific Question:   Reason for exam:     Answer:   sacral and coccyx pain    Pneumococcal polysaccharide vaccine 23-valent greater than or equal to 1yo subcutaneous/IM    CBC Auto Differential     To be done in 6 months     Standing Status:   Future     Standing Expiration Date:   1/9/2021    Comprehensive Metabolic Panel     To be done in 6 months     Standing Status:   Future     Standing Expiration Date:   1/9/2021    Hemoglobin A1C     To be done in 6 months     Standing Status:   Future     Standing Expiration Date:   1/9/2021    Lipid Panel     To be done in

## 2020-01-14 ENCOUNTER — HOSPITAL ENCOUNTER (OUTPATIENT)
Dept: GENERAL RADIOLOGY | Age: 65
Discharge: HOME | End: 2020-01-16
Payer: MEDICARE

## 2020-01-14 ENCOUNTER — HOSPITAL ENCOUNTER (OUTPATIENT)
Dept: NON INVASIVE DIAGNOSTICS | Age: 65
Discharge: HOME | End: 2020-01-14
Payer: MEDICARE

## 2020-01-14 LAB
LV EF: 69 %
LVEF MODALITY: NORMAL

## 2020-01-14 PROCEDURE — 72220 X-RAY EXAM SACRUM TAILBONE: CPT

## 2020-01-14 PROCEDURE — 93306 TTE W/DOPPLER COMPLETE: CPT

## 2020-01-14 PROCEDURE — 73521 X-RAY EXAM HIPS BI 2 VIEWS: CPT

## 2020-01-14 PROCEDURE — 72100 X-RAY EXAM L-S SPINE 2/3 VWS: CPT

## 2020-01-16 NOTE — PROGRESS NOTES
Orders placed for echo and physical therapy referral per instruction from Dr Jazmine Paez from X ray and echo results done 1/14/2020

## 2020-01-23 ENCOUNTER — HOSPITAL ENCOUNTER (OUTPATIENT)
Dept: PHYSICAL THERAPY | Age: 65
Setting detail: THERAPIES SERIES
Discharge: HOME | End: 2020-01-23
Payer: MEDICARE

## 2020-01-23 PROCEDURE — 97140 MANUAL THERAPY 1/> REGIONS: CPT

## 2020-01-23 PROCEDURE — 97161 PT EVAL LOW COMPLEX 20 MIN: CPT

## 2020-01-23 ASSESSMENT — PAIN DESCRIPTION - FREQUENCY: FREQUENCY: CONTINUOUS

## 2020-01-23 ASSESSMENT — PAIN DESCRIPTION - PAIN TYPE: TYPE: CHRONIC PAIN;ACUTE PAIN

## 2020-01-23 ASSESSMENT — PAIN DESCRIPTION - PROGRESSION: CLINICAL_PROGRESSION: GRADUALLY WORSENING

## 2020-01-23 ASSESSMENT — PAIN DESCRIPTION - LOCATION: LOCATION: BACK;LEG;BUTTOCKS

## 2020-01-23 ASSESSMENT — PAIN DESCRIPTION - DESCRIPTORS: DESCRIPTORS: ACHING;SORE

## 2020-01-23 ASSESSMENT — PAIN SCALES - GENERAL: PAINLEVEL_OUTOF10: 5

## 2020-01-23 ASSESSMENT — PAIN DESCRIPTION - ORIENTATION: ORIENTATION: LEFT;RIGHT

## 2020-01-23 NOTE — PLAN OF CARE
weeks [x] 6 weeks     [x] 3 days   [] 3 weeks [] 7 weeks     [] 4 days   [] 4 weeks [] 8 weeks    Rehab Potential: [] Excellent [x] Good [] Fair  [] Poor     Electronically signed by:  Susana Eisenberg PT    If you have any questions or concerns, please don't hesitate to call.   Thank you for your referral.      Physician Signature:________________________________Date:__________________  By signing above, therapists plan is approved by physician

## 2020-01-23 NOTE — PROGRESS NOTES
Status: Within Functional Limits    Social/Functional History  Social/Functional History  Ambulation Assistance: Independent  Transfer Assistance: Independent  Active : Yes  Occupation: Retired    Objective     Observation/Palpation  Palpation: moderate tightness spasm of the Right ischial tub/hamstring insertion. Mild tightness on the left. Observation: Right PSIS deep, Left sacral boarder deep, Left PSIS high, equal leg length, Right Greater trochanter posterior     Spine  Lumbar: flexion to toes, ext WFL with decreased pain, bilateral rotation limited 25%     Strength RLE  Strength RLE: Exception  R Hip Flexion: 4/5  R Hip ABduction: 4+/5  R Hip ADduction: 4+/5  R Knee Flexion: 5/5  R Knee Extension: 5/5  Strength LLE  Strength LLE: Exception  L Hip Flexion: 4/5  L Hip ABduction: 4+/5  L Hip ADduction: 4+/5  L Knee Flexion: 5/5  L Knee Extension: 5/5     Additional Measures  Special Tests: Prone lying no pain, prop no noted change. Stairs/Curb  Stairs?: Yes  Gait Deviations  Gait Deviations: Slow Emelia  Balance  Posture: Good  Sitting - Static: Good  Sitting - Dynamic: Good  Standing - Static: Good  Standing - Dynamic: Good                         Assessment   Conditions Requiring Skilled Therapeutic Intervention  Body structures, Functions, Activity limitations: Decreased functional mobility ; Decreased ADL status; Decreased ROM; Decreased strength; Increased pain;Decreased balance;Decreased endurance;Decreased posture  Assessment: Right>Left discomfort of the hamstring insertion at the ischial tuberosity. Patient has pelvic assymetry with possible cause for bilateral glut and hamstring pain.    Prognosis: Good  Decision Making: Medium Complexity  REQUIRES PT FOLLOW UP: Yes  Activity Tolerance  Activity Tolerance: Patient Tolerated treatment well         Plan   Plan  Times per week: 2-3x/week   Plan weeks: 6 weeks   Current Treatment Recommendations: Strengthening, Gait Training, Manual Therapy - Joint Manipulation, Patient/Caregiver Education & Training, Aquatics, ROM, Equipment Evaluation, Education, & procurement, Balance Training, Neuromuscular Re-education, Pain Management, Modalities, Home Exercise Program, Manual Therapy - Soft Tissue Mobilization, Endurance Training, Transfer Training, Safety Education & Training, Integrated Dry Needling, Functional Mobility Training    G-Code       OutComes Score                                                  AM-PAC Score             Goals  Short term goals  Time Frame for Short term goals: 3 weeks   Short term goal 1: Initaite HEP   Long term goals  Time Frame for Long term goals : 6 weeks   Long term goal 1: Independent in [address] term goal 2: Patient to be able to sit up to 1 hour without increase in glut pain. Long term goal 3: Patient to be able to lift and carry grandchildren without increase in pain. Long term goal 4: Improve LE strength to 4+/5 bilateral hips to ease sit to stands.    Long term goal 5: Improve Pelvic symmetry   Patient Goals   Patient goals : Decrease Pain        Therapy Time   Individual Concurrent Group Co-treatment   Time In 0910         Time Out 0955         Minutes 45         Timed Code Treatment Minutes: 10 Minutes       Marcio Ivey PT

## 2020-01-23 NOTE — FLOWSHEET NOTE
Physical Therapy Daily Treatment Note    Date:  2020    Patient Name:  Marilyn Day    :  1955  MRN: [id number]  Restrictions/Precautions:     Medical/Treatment Diagnosis Information:   · Diagnosis: M79.18 (ICD-10-CM) - Gluteal pain, M25.551, M25.552 (ICD-10-CM) - Pain of both hip joints  ·    Insurance/Certification information:  PT Insurance Information: Medicare/[employer] [address]  Physician Information:  Referring Practitioner: Alm Bloch, DO  Plan of care signed (Y/N):  n  Visit# / total visits:  1/10  Pain level: 5/10   Time In: 910  Time Out:955    Progress Note: [x]  Yes  []  No  Next due by: Visit #10      Subjective:   See Eval  Objective: See Eval   Observation:   Test measurements:      Exercises:   Exercise/Equipment Resistance/Repetitions Other comments        Manual--Pelvic Alignment             Prone prop     Prone hip ext     PKB     Alt UE/LE quadriped      Cat camel           bridging      LTR      Clam shells           Right greater trochanter gravity assisted mob  Supine x 5' HEP 3x/day    [] Provided verbal/tactile cueing for activities related to strengthening, flexibility, endurance, ROM. (70119)  [] Provided verbal/tactile cueing for activities related to improving balance, coordination, kinesthetic sense, posture, motor skill, proprioception. (18255)    Therapeutic Activities:     [] Therapeutic activities, direct (one-on-one) patient contact (use of dynamic activities to improve functional performance). (81218)    Gait:   [] Provided training and instruction to the patient for ambulation re-education. (12182)    Self-Care/ADL's  [] Self-care/home management training and compensatory training, meal preparation, safety procedures, and instructions in use of assistive technology devices/adaptive equipment, direct one-on-one contact. (17895)    Home Exercise Program:     [] Reviewed/Progressed HEP activities related to strengthening, flexibility, endurance, ROM.

## 2020-01-24 ENCOUNTER — HOSPITAL ENCOUNTER (OUTPATIENT)
Dept: PHYSICAL THERAPY | Age: 65
Setting detail: THERAPIES SERIES
Discharge: HOME | End: 2020-01-24
Payer: MEDICARE

## 2020-01-24 PROCEDURE — G0283 ELEC STIM OTHER THAN WOUND: HCPCS | Performed by: PHYSICAL THERAPY ASSISTANT

## 2020-01-24 PROCEDURE — 97110 THERAPEUTIC EXERCISES: CPT | Performed by: PHYSICAL THERAPY ASSISTANT

## 2020-01-24 NOTE — FLOWSHEET NOTE
Therapeutic activities, direct (one-on-one) patient contact (use of dynamic activities to improve functional performance). (04214)    Gait:   [] Provided training and instruction to the patient for ambulation re-education. (40726)    Self-Care/ADL's  [] Self-care/home management training and compensatory training, meal preparation, safety procedures, and instructions in use of assistive technology devices/adaptive equipment, direct one-on-one contact. (31972)    Home Exercise Program:     [] Reviewed/Progressed HEP activities related to strengthening, flexibility, endurance, ROM. (82937)  [] Reviewed/Progressed HEP activities related to improving balance, coordination, kinesthetic sense, posture, motor skill, proprioception.  (69754)    Manual Treatments:  Right grade 3 joint mob of the sacral boarder, PA mob of the Right greater trochanter, Left MET correction hip flexion   [x] Provided manual therapy to mobilize soft tissue/joints for the purpose of modulating pain, promoting relaxation,  increasing ROM, reducing/eliminating soft tissue swelling/inflammation/restriction, improving soft tissue extensibility. (59850)    Service Based Modalities:  IFC x15'    Timed Code Treatment Minutes:   28' SANDY      Total Treatment Minutes:   52'    Treatment/Activity Tolerance:  [x] Patient tolerated treatment well [] Patient limited by fatique  [] Patient limited by pain  [] Patient limited by other medical complications  [] Other:     Prognosis: [x] Good [] Fair  [] Poor    Patient Requires Follow-up: [x] Yes  [] No      Goals:  Short term goals  Time Frame for Short term goals: 3 weeks   Short term goal 1: Initaite HEP (Initiated HEP 1/24/2020)    Long term goals  Time Frame for Long term goals : 6 weeks   Long term goal 1: Independent in HEP   Long term goal 2: Patient to be able to sit up to 1 hour without increase in glut pain. Long term goal 3: Patient to be able to lift and carry grandchildren without increase in pain.

## 2020-01-27 ENCOUNTER — HOSPITAL ENCOUNTER (OUTPATIENT)
Dept: PHYSICAL THERAPY | Age: 65
Setting detail: THERAPIES SERIES
Discharge: HOME | End: 2020-01-27
Payer: MEDICARE

## 2020-01-27 PROCEDURE — G0283 ELEC STIM OTHER THAN WOUND: HCPCS

## 2020-01-27 PROCEDURE — 97110 THERAPEUTIC EXERCISES: CPT

## 2020-01-27 NOTE — FLOWSHEET NOTE
strengthening, flexibility, endurance, ROM. (07968)  [] Provided verbal/tactile cueing for activities related to improving balance, coordination, kinesthetic sense, posture, motor skill, proprioception. (63995)    Therapeutic Activities:     [] Therapeutic activities, direct (one-on-one) patient contact (use of dynamic activities to improve functional performance). (05535)    Gait:   [] Provided training and instruction to the patient for ambulation re-education. (64387)    Self-Care/ADL's  [] Self-care/home management training and compensatory training, meal preparation, safety procedures, and instructions in use of assistive technology devices/adaptive equipment, direct one-on-one contact. (04907)    Home Exercise Program:     [] Reviewed/Progressed HEP activities related to strengthening, flexibility, endurance, ROM. (47039)  [] Reviewed/Progressed HEP activities related to improving balance, coordination, kinesthetic sense, posture, motor skill, proprioception.  (75574)    Manual Treatments:     [x] Provided manual therapy to mobilize soft tissue/joints for the purpose of modulating pain, promoting relaxation,  increasing ROM, reducing/eliminating soft tissue swelling/inflammation/restriction, improving soft tissue extensibility.  (14698)    Service Based Modalities:  IFC x15' to lumbar spine for pain/inflammation reduction; prone    Timed Code Treatment Minutes:   32' SANDY      Total Treatment Minutes:   52'    Treatment/Activity Tolerance:  [x] Patient tolerated treatment well [] Patient limited by fatique  [] Patient limited by pain  [] Patient limited by other medical complications  [] Other:     Prognosis: [x] Good [] Fair  [] Poor    Patient Requires Follow-up: [x] Yes  [] No      Goals:  Short term goals  Time Frame for Short term goals: 3 weeks   Short term goal 1: Initaite HEP (Initiated HEP 1/24/2020)    Long term goals  Time Frame for Long term goals : 6 weeks   Long term goal 1: Independent in HEP

## 2020-01-30 ENCOUNTER — HOSPITAL ENCOUNTER (OUTPATIENT)
Dept: PHYSICAL THERAPY | Age: 65
Setting detail: THERAPIES SERIES
Discharge: HOME | End: 2020-01-30
Payer: MEDICARE

## 2020-01-30 PROCEDURE — 97110 THERAPEUTIC EXERCISES: CPT | Performed by: PHYSICAL THERAPIST

## 2020-01-30 PROCEDURE — 97140 MANUAL THERAPY 1/> REGIONS: CPT | Performed by: PHYSICAL THERAPIST

## 2020-01-31 ENCOUNTER — HOSPITAL ENCOUNTER (OUTPATIENT)
Dept: PHYSICAL THERAPY | Age: 65
Setting detail: THERAPIES SERIES
Discharge: HOME | End: 2020-01-31
Payer: MEDICARE

## 2020-01-31 PROCEDURE — 97140 MANUAL THERAPY 1/> REGIONS: CPT

## 2020-01-31 PROCEDURE — 97110 THERAPEUTIC EXERCISES: CPT

## 2020-01-31 NOTE — FLOWSHEET NOTE
endurance, ROM. (05214)  [] Provided verbal/tactile cueing for activities related to improving balance, coordination, kinesthetic sense, posture, motor skill, proprioception. (11139)    Therapeutic Activities:     [] Therapeutic activities, direct (one-on-one) patient contact (use of dynamic activities to improve functional performance). (08611)    Gait:   [] Provided training and instruction to the patient for ambulation re-education. (89270)    Self-Care/ADL's  [] Self-care/home management training and compensatory training, meal preparation, safety procedures, and instructions in use of assistive technology devices/adaptive equipment, direct one-on-one contact. (81286)    Home Exercise Program:     [] Reviewed/Progressed HEP activities related to strengthening, flexibility, endurance, ROM. (07228)  [] Reviewed/Progressed HEP activities related to improving balance, coordination, kinesthetic sense, posture, motor skill, proprioception.  (23039)    Manual Treatments:  -IDN performed this date by Brandon Cevallos PT  to decrease pain, trigger points, tone, and spasm as well as increase tissue extensibility. Specific placement and dose can be seen on separately scanned image. [x] Provided manual therapy to mobilize soft tissue/joints for the purpose of modulating pain, promoting relaxation,  increasing ROM, reducing/eliminating soft tissue swelling/inflammation/restriction, improving soft tissue extensibility.  (61452)    Service Based Modalities:      Timed Code Treatment Minutes:   37' SANDY       10' Manual      Total Treatment Minutes:   48'    Treatment/Activity Tolerance:  [x] Patient tolerated treatment well [] Patient limited by fatique  [] Patient limited by pain  [] Patient limited by other medical complications  [] Other:     Prognosis: [x] Good [] Fair  [] Poor    Patient Requires Follow-up: [x] Yes  [] No      Goals:  Short term goals  Time Frame for Short term goals: 3 weeks   Short term goal 1: Initaite HEP (Initiated HEP 1/24/2020)    Long term goals  Time Frame for Long term goals : 6 weeks   Long term goal 1: Independent in HEP   Long term goal 2: Patient to be able to sit up to 1 hour without increase in glut pain. Long term goal 3: Patient to be able to lift and carry grandchildren without increase in pain. Long term goal 4: Improve LE strength to 4+/5 bilateral hips to ease sit to stands. Long term goal 5: Improve Pelvic symmetry     Plan:   [x] Continue per plan of care [] Alter current plan (see comments)  [] Plan of care initiated [] Hold pending MD visit [] Discharge    Plan for Next Session:  Monitor tolerance to prone exercises and pain. Advance as able.      Electronically signed by:  Tari Johnson

## 2020-02-03 ENCOUNTER — HOSPITAL ENCOUNTER (OUTPATIENT)
Dept: PHYSICAL THERAPY | Age: 65
Setting detail: THERAPIES SERIES
Discharge: HOME | End: 2020-02-03
Payer: MEDICARE

## 2020-02-03 PROCEDURE — 97110 THERAPEUTIC EXERCISES: CPT

## 2020-02-03 NOTE — FLOWSHEET NOTE
Clam shells      IDN  No therapist availability. Right greater trochanter gravity assisted mob   HEP 3x/day    [x] Provided verbal/tactile cueing for activities related to strengthening, flexibility, endurance, ROM. (59990)  [] Provided verbal/tactile cueing for activities related to improving balance, coordination, kinesthetic sense, posture, motor skill, proprioception. (89934)    Therapeutic Activities:     [] Therapeutic activities, direct (one-on-one) patient contact (use of dynamic activities to improve functional performance). (90990)    Gait:   [] Provided training and instruction to the patient for ambulation re-education. (95703)    Self-Care/ADL's  [] Self-care/home management training and compensatory training, meal preparation, safety procedures, and instructions in use of assistive technology devices/adaptive equipment, direct one-on-one contact. (50175)    Home Exercise Program:     [] Reviewed/Progressed HEP activities related to strengthening, flexibility, endurance, ROM. (52600)  [] Reviewed/Progressed HEP activities related to improving balance, coordination, kinesthetic sense, posture, motor skill, proprioception.  (33403)    Manual Treatments:    [] Provided manual therapy to mobilize soft tissue/joints for the purpose of modulating pain, promoting relaxation,  increasing ROM, reducing/eliminating soft tissue swelling/inflammation/restriction, improving soft tissue extensibility.  (01278)    Service Based Modalities:  FC x15' to R low back/hip for pain/inflammation reduction; prone    Timed Code Treatment Minutes:   35' SANDY            Total Treatment Minutes:   48'    Treatment/Activity Tolerance:  [x] Patient tolerated treatment well [] Patient limited by fatique  [] Patient limited by pain  [] Patient limited by other medical complications  [] Other:     Prognosis: [x] Good [] Fair  [] Poor    Patient Requires Follow-up: [x] Yes  [] No      Goals:  Short term goals  Time Frame for Short term goals: 3 weeks   Short term goal 1: Initaite HEP (Initiated HEP 1/24/2020)    Long term goals  Time Frame for Long term goals : 6 weeks   Long term goal 1: Independent in HEP   Long term goal 2: Patient to be able to sit up to 1 hour without increase in glut pain. Long term goal 3: Patient to be able to lift and carry grandchildren without increase in pain. Long term goal 4: Improve LE strength to 4+/5 bilateral hips to ease sit to stands. Long term goal 5: Improve Pelvic symmetry     Plan:   [x] Continue per plan of care [] Alter current plan (see comments)  [] Plan of care initiated [] Hold pending MD visit [] Discharge    Plan for Next Session: Monitor and progress as tolerated.     Electronically signed by:  Radhika Pierce PTA

## 2020-02-05 ENCOUNTER — APPOINTMENT (OUTPATIENT)
Dept: PHYSICAL THERAPY | Age: 65
End: 2020-02-05
Payer: MEDICARE

## 2020-02-06 ENCOUNTER — HOSPITAL ENCOUNTER (OUTPATIENT)
Dept: PHYSICAL THERAPY | Age: 65
Setting detail: THERAPIES SERIES
Discharge: HOME | End: 2020-02-06
Payer: MEDICARE

## 2020-02-06 PROCEDURE — 97110 THERAPEUTIC EXERCISES: CPT | Performed by: PHYSICAL THERAPIST

## 2020-02-06 PROCEDURE — 97140 MANUAL THERAPY 1/> REGIONS: CPT | Performed by: PHYSICAL THERAPIST

## 2020-02-06 NOTE — FLOWSHEET NOTE
Manual            Total Treatment Minutes:   47'    Treatment/Activity Tolerance:  [x] Patient tolerated treatment well [] Patient limited by fatique  [] Patient limited by pain  [] Patient limited by other medical complications  [] Other:     Prognosis: [x] Good [] Fair  [] Poor    Patient Requires Follow-up: [x] Yes  [] No      Goals:  Short term goals  Time Frame for Short term goals: 3 weeks   Short term goal 1: Initaite HEP (Initiated HEP 1/24/2020)    Long term goals  Time Frame for Long term goals : 6 weeks   Long term goal 1: Independent in HEP   Long term goal 2: Patient to be able to sit up to 1 hour without increase in glut pain. Long term goal 3: Patient to be able to lift and carry grandchildren without increase in pain. Long term goal 4: Improve LE strength to 4+/5 bilateral hips to ease sit to stands. Long term goal 5: Improve Pelvic symmetry     Plan:   [x] Continue per plan of care [] Alter current plan (see comments)  [] Plan of care initiated [] Hold pending MD visit [] Discharge    Plan for Next Session: Monitor and progress as tolerated.     Electronically signed by:  Gudelia Barr, PT, DPT

## 2020-02-07 ENCOUNTER — HOSPITAL ENCOUNTER (OUTPATIENT)
Dept: PHYSICAL THERAPY | Age: 65
Setting detail: THERAPIES SERIES
Discharge: HOME | End: 2020-02-07
Payer: MEDICARE

## 2020-02-07 PROCEDURE — 97110 THERAPEUTIC EXERCISES: CPT | Performed by: PHYSICAL THERAPIST

## 2020-02-07 PROCEDURE — 97140 MANUAL THERAPY 1/> REGIONS: CPT | Performed by: PHYSICAL THERAPIST

## 2020-02-07 NOTE — FLOWSHEET NOTE
Physical Therapy Daily Treatment Note    Date:  2020    Patient Name:  Jyothi Villalta    :  1955  MRN: [id number]  Restrictions/Precautions:     Medical/Treatment Diagnosis Information:   · Diagnosis: M79.18 (ICD-10-CM) - Gluteal pain, M25.551, M25.552 (ICD-10-CM) - Pain of both hip joints     Insurance/Certification information:  PT Insurance Information: Medicare/[employer] [address]  Physician Information:  Referring Practitioner: Rhianna Knowles DO  Plan of care signed (Y/N):  Y  Visit# / total visits:  8/10  Pain level:   8-9/10 down the leg    5/10 in lower back     Time In: 1:34  Time Out: 2:27    Progress Note: []  Yes  [x]  No  Next due by: Visit #10      Subjective: \"I had a little longer relief from yesterday's session, but by the end of the day and this morning I was pretty sore again. I've been doing a lot of housework to get ready since my  is having surgery on Monday. \"    Objective: Pt tolerated todays treatment well indicating no signs of increased pain or symptoms to facilitate strength, motion, mobility and decrease pain to return to daily activities without limitiations. Pt most challenged with quadruped UE/LE alternating motions due to decreased core strength for maintaining balance. IFC to R low back/hip to decrease pain on this date with good overall response.      Observation:    Flat Lumbar Lordosis  Test measurements:   Prone/Extension based therex reduces symptom intensity     Prone extension through 50%  COntinues to have tone and spasm throughout lumbar paraspinals this date as both observed and palpated maually    Exercises:   Exercise/Equipment Resistance/Repetitions Other comments        Manual--Pelvic Alignment        Prone 5'    Prone prop 3' Increases shoulders/neck pain   Press ups 15x x2    Prone hip ext 15x    PKB 15x2    Prone Hip IR/ER 10x Manually         Alt UE/LE quadriped  10x    Cat camel  10x         Modified Extension 10x     Backbends 10x    Ab Minutes:   48'    Treatment/Activity Tolerance:  [x] Patient tolerated treatment well [] Patient limited by fatique  [] Patient limited by pain  [] Patient limited by other medical complications  [] Other:     Prognosis: [x] Good [] Fair  [] Poor    Patient Requires Follow-up: [x] Yes  [] No      Goals:  Short term goals  Time Frame for Short term goals: 3 weeks   Short term goal 1: Initaite HEP (Initiated HEP 1/24/2020)    Long term goals  Time Frame for Long term goals : 6 weeks   Long term goal 1: Independent in HEP   Long term goal 2: Patient to be able to sit up to 1 hour without increase in glut pain. Long term goal 3: Patient to be able to lift and carry grandchildren without increase in pain. Long term goal 4: Improve LE strength to 4+/5 bilateral hips to ease sit to stands. Long term goal 5: Improve Pelvic symmetry     Plan:   [x] Continue per plan of care [] Alter current plan (see comments)  [] Plan of care initiated [] Hold pending MD visit [] Discharge    Plan for Next Session: Monitor and progress as tolerated.     Electronically signed by:  Tessie Saeed, PT, DPT

## 2020-04-21 ENCOUNTER — OFFICE VISIT (OUTPATIENT)
Dept: PRIMARY CARE CLINIC | Age: 65
End: 2020-04-21
Payer: MEDICARE

## 2020-04-21 VITALS
SYSTOLIC BLOOD PRESSURE: 138 MMHG | BODY MASS INDEX: 26.19 KG/M2 | OXYGEN SATURATION: 98 % | HEIGHT: 64 IN | WEIGHT: 153.4 LBS | TEMPERATURE: 96.1 F | HEART RATE: 92 BPM | DIASTOLIC BLOOD PRESSURE: 78 MMHG

## 2020-04-21 PROCEDURE — 1090F PRES/ABSN URINE INCON ASSESS: CPT | Performed by: NURSE PRACTITIONER

## 2020-04-21 PROCEDURE — G8417 CALC BMI ABV UP PARAM F/U: HCPCS | Performed by: NURSE PRACTITIONER

## 2020-04-21 PROCEDURE — 3017F COLORECTAL CA SCREEN DOC REV: CPT | Performed by: NURSE PRACTITIONER

## 2020-04-21 PROCEDURE — 1123F ACP DISCUSS/DSCN MKR DOCD: CPT | Performed by: NURSE PRACTITIONER

## 2020-04-21 PROCEDURE — 99212 OFFICE O/P EST SF 10 MIN: CPT

## 2020-04-21 PROCEDURE — 4040F PNEUMOC VAC/ADMIN/RCVD: CPT | Performed by: NURSE PRACTITIONER

## 2020-04-21 PROCEDURE — 99213 OFFICE O/P EST LOW 20 MIN: CPT | Performed by: NURSE PRACTITIONER

## 2020-04-21 PROCEDURE — G8400 PT W/DXA NO RESULTS DOC: HCPCS | Performed by: NURSE PRACTITIONER

## 2020-04-21 PROCEDURE — G8427 DOCREV CUR MEDS BY ELIG CLIN: HCPCS | Performed by: NURSE PRACTITIONER

## 2020-04-21 PROCEDURE — 1036F TOBACCO NON-USER: CPT | Performed by: NURSE PRACTITIONER

## 2020-04-21 RX ORDER — DIAPER,BRIEF,INFANT-TODD,DISP
EACH MISCELLANEOUS
Qty: 1 TUBE | Refills: 0 | Status: SHIPPED | OUTPATIENT
Start: 2020-04-21 | End: 2020-04-28

## 2020-04-21 RX ORDER — PREDNISONE 20 MG/1
20 TABLET ORAL 2 TIMES DAILY
Qty: 10 TABLET | Refills: 0 | Status: SHIPPED | OUTPATIENT
Start: 2020-04-21 | End: 2020-04-26

## 2020-04-21 ASSESSMENT — ENCOUNTER SYMPTOMS: RESPIRATORY NEGATIVE: 1

## 2020-06-03 ENCOUNTER — OFFICE VISIT (OUTPATIENT)
Dept: FAMILY MEDICINE CLINIC | Age: 65
End: 2020-06-03
Payer: MEDICARE

## 2020-06-03 ENCOUNTER — HOSPITAL ENCOUNTER (OUTPATIENT)
Dept: CT IMAGING | Age: 65
Discharge: HOME | End: 2020-06-05
Payer: MEDICARE

## 2020-06-03 VITALS
SYSTOLIC BLOOD PRESSURE: 124 MMHG | BODY MASS INDEX: 26.29 KG/M2 | WEIGHT: 154 LBS | OXYGEN SATURATION: 97 % | TEMPERATURE: 98.1 F | HEIGHT: 64 IN | HEART RATE: 84 BPM | DIASTOLIC BLOOD PRESSURE: 70 MMHG

## 2020-06-03 PROCEDURE — 99215 OFFICE O/P EST HI 40 MIN: CPT | Performed by: FAMILY MEDICINE

## 2020-06-03 PROCEDURE — 3017F COLORECTAL CA SCREEN DOC REV: CPT | Performed by: FAMILY MEDICINE

## 2020-06-03 PROCEDURE — 70450 CT HEAD/BRAIN W/O DYE: CPT

## 2020-06-03 PROCEDURE — G8400 PT W/DXA NO RESULTS DOC: HCPCS | Performed by: FAMILY MEDICINE

## 2020-06-03 PROCEDURE — 1090F PRES/ABSN URINE INCON ASSESS: CPT | Performed by: FAMILY MEDICINE

## 2020-06-03 PROCEDURE — 1036F TOBACCO NON-USER: CPT | Performed by: FAMILY MEDICINE

## 2020-06-03 PROCEDURE — G8417 CALC BMI ABV UP PARAM F/U: HCPCS | Performed by: FAMILY MEDICINE

## 2020-06-03 PROCEDURE — 99213 OFFICE O/P EST LOW 20 MIN: CPT | Performed by: FAMILY MEDICINE

## 2020-06-03 PROCEDURE — 4040F PNEUMOC VAC/ADMIN/RCVD: CPT | Performed by: FAMILY MEDICINE

## 2020-06-03 PROCEDURE — 1123F ACP DISCUSS/DSCN MKR DOCD: CPT | Performed by: FAMILY MEDICINE

## 2020-06-03 PROCEDURE — G8427 DOCREV CUR MEDS BY ELIG CLIN: HCPCS | Performed by: FAMILY MEDICINE

## 2020-06-03 RX ORDER — PREDNISONE 20 MG/1
TABLET ORAL
Qty: 10 TABLET | Refills: 0 | Status: SHIPPED | OUTPATIENT
Start: 2020-06-03 | End: 2020-07-14

## 2020-06-07 ASSESSMENT — ENCOUNTER SYMPTOMS
VOMITING: 0
DIARRHEA: 0
BLURRED VISION: 0
WHEEZING: 0
ABDOMINAL PAIN: 0
SINUS PRESSURE: 0
EYE REDNESS: 0
TROUBLE SWALLOWING: 0
EYE PAIN: 0
VISUAL CHANGE: 0
NAUSEA: 0
RHINORRHEA: 0
SORE THROAT: 0
SHORTNESS OF BREATH: 0
EYE DISCHARGE: 0
CONSTIPATION: 0
SCALP TENDERNESS: 0
COUGH: 0
PHOTOPHOBIA: 1

## 2020-06-07 NOTE — PROGRESS NOTES
Exam  Vitals signs and nursing note reviewed. Constitutional:       General: She is not in acute distress. Appearance: Normal appearance. She is well-developed. She is not diaphoretic. HENT:      Head: Normocephalic and atraumatic. Right Ear: Tympanic membrane, ear canal and external ear normal.      Left Ear: Tympanic membrane, ear canal and external ear normal.      Nose: Nose normal.      Mouth/Throat:      Mouth: Mucous membranes are moist.      Pharynx: Oropharynx is clear. No oropharyngeal exudate. Eyes:      General:         Right eye: No discharge. Left eye: No discharge. Conjunctiva/sclera: Conjunctivae normal.      Pupils: Pupils are equal, round, and reactive to light. Neck:      Musculoskeletal: Normal range of motion and neck supple. Thyroid: No thyromegaly. Cardiovascular:      Rate and Rhythm: Normal rate and regular rhythm. Heart sounds: Normal heart sounds. Pulmonary:      Effort: Pulmonary effort is normal.      Breath sounds: Normal breath sounds. No wheezing or rales. Abdominal:      General: Bowel sounds are normal. There is no distension. Palpations: Abdomen is soft. Tenderness: There is no abdominal tenderness. Lymphadenopathy:      Cervical: No cervical adenopathy. Skin:     General: Skin is warm and dry. Findings: No rash. Neurological:      Mental Status: She is alert and oriented to person, place, and time. Cranial Nerves: Cranial nerves are intact. Sensory: No sensory deficit. Motor: Motor function is intact. Coordination: Coordination is intact. Deep Tendon Reflexes: Reflexes are normal and symmetric. Psychiatric:         Behavior: Behavior normal.         Thought Content: Thought content normal.         Judgment: Judgment normal.         ASSESSMENT/PLAN:  Encounter Diagnoses   Name Primary?     Acute intractable headache, unspecified headache type Yes    History of CVA (cerebrovascular accident)      Orders Placed This Encounter   Medications    predniSONE (DELTASONE) 20 MG tablet     Si bid for 3 days, 1 qd for 4 days     Dispense:  10 tablet     Refill:  0     Orders Placed This Encounter   Procedures    CT HEAD WO CONTRAST     Standing Status:   Future     Number of Occurrences:   1     Standing Expiration Date:   6/3/2021     CT HEAD WO CONTRAST  Narrative: EXAMINATION:  CT OF THE HEAD WITHOUT CONTRAST  6/3/2020 2:53 pm    TECHNIQUE:  CT of the head was performed without the administration of intravenous  contrast. Dose modulation, iterative reconstruction, and/or weight based  adjustment of the mA/kV was utilized to reduce the radiation dose to as low  as reasonably achievable. COMPARISON:  None. HISTORY:  ORDERING SYSTEM PROVIDED HISTORY: Acute intractable headache, unspecified  headache type  TECHNOLOGIST PROVIDED HISTORY:  Reason for Exam: Left sided headaches for 2 weeks, history of CVA affecting  left side. Acuity: Acute  Type of Exam: Initial    FINDINGS:  Skull/soft tissue:  Normal bone mineral density. The paranasal sinuses and  mastoid air cells are patent. No acute osseous abnormality. Intracranial contents:  No midline shift. The gray-white interface is  intact. The sulcal, ventricular and cisternal spaces are age-appropriate. No acute intracranial hemorrhage. No evidence of a mass on this  noncontrasted exam.  Impression: No acute intracranial abnormality. CT done today due to concern regarding similar symptoms to previous CVA. No acute findings noted. Symptoms also could represent occipital neuralgia. Distribution is into left eyebrow region. Will give prednisone to help with any acute inflammation and see if this helps with symptoms. Can take tylenol if needed for pain. Would monitor for now. If no improvement after treatment she should let my office know and will need to consider further medical treatment.     Return  if no improvement in symptoms or if any further symptoms arise. No follow-ups on file. An electronic signature was used to authenticate this note.     --Wilkins Ganser, DO on 6/7/2020 at 11:11 AM

## 2020-06-11 ENCOUNTER — TELEPHONE (OUTPATIENT)
Dept: FAMILY MEDICINE CLINIC | Age: 65
End: 2020-06-11

## 2020-06-12 RX ORDER — TOPIRAMATE 50 MG/1
50 TABLET, FILM COATED ORAL NIGHTLY
Qty: 30 TABLET | Refills: 1 | Status: SHIPPED | OUTPATIENT
Start: 2020-06-12 | End: 2020-07-14

## 2020-06-16 ENCOUNTER — HOSPITAL ENCOUNTER (OUTPATIENT)
Dept: MAMMOGRAPHY | Age: 65
Discharge: HOME | End: 2020-06-18
Payer: MEDICARE

## 2020-06-16 PROCEDURE — 77063 BREAST TOMOSYNTHESIS BI: CPT

## 2020-06-26 ENCOUNTER — HOSPITAL ENCOUNTER (OUTPATIENT)
Dept: MAMMOGRAPHY | Age: 65
Discharge: HOME | End: 2020-06-28
Payer: MEDICARE

## 2020-06-26 ENCOUNTER — HOSPITAL ENCOUNTER (OUTPATIENT)
Dept: ULTRASOUND IMAGING | Age: 65
Discharge: HOME | End: 2020-06-28
Payer: MEDICARE

## 2020-06-26 PROCEDURE — 76642 ULTRASOUND BREAST LIMITED: CPT

## 2020-06-26 PROCEDURE — G0279 TOMOSYNTHESIS, MAMMO: HCPCS

## 2020-07-14 ENCOUNTER — HOSPITAL ENCOUNTER (OUTPATIENT)
Dept: LAB | Age: 65
Discharge: HOME | End: 2020-07-14
Payer: MEDICARE

## 2020-07-14 ENCOUNTER — OFFICE VISIT (OUTPATIENT)
Dept: FAMILY MEDICINE CLINIC | Age: 65
End: 2020-07-14
Payer: MEDICARE

## 2020-07-14 VITALS
OXYGEN SATURATION: 98 % | DIASTOLIC BLOOD PRESSURE: 70 MMHG | TEMPERATURE: 97.1 F | RESPIRATION RATE: 18 BRPM | BODY MASS INDEX: 25.95 KG/M2 | WEIGHT: 152 LBS | HEART RATE: 84 BPM | SYSTOLIC BLOOD PRESSURE: 130 MMHG | HEIGHT: 64 IN

## 2020-07-14 LAB
ABSOLUTE EOS #: 0.11 K/UL (ref 0–0.44)
ABSOLUTE IMMATURE GRANULOCYTE: <0.03 K/UL (ref 0–0.3)
ABSOLUTE LYMPH #: 1.88 K/UL (ref 1.1–3.7)
ABSOLUTE MONO #: 0.41 K/UL (ref 0.1–1.2)
ALBUMIN SERPL-MCNC: 4.6 G/DL (ref 3.5–5.2)
ALBUMIN/GLOBULIN RATIO: 2.1 (ref 1–2.5)
ALP BLD-CCNC: 128 U/L (ref 35–104)
ALT SERPL-CCNC: 26 U/L (ref 5–33)
ANION GAP SERPL CALCULATED.3IONS-SCNC: 13 MMOL/L (ref 9–17)
AST SERPL-CCNC: 19 U/L
BASOPHILS # BLD: 0 % (ref 0–2)
BASOPHILS ABSOLUTE: <0.03 K/UL (ref 0–0.2)
BILIRUB SERPL-MCNC: 1.03 MG/DL (ref 0.3–1.2)
BUN BLDV-MCNC: 10 MG/DL (ref 8–23)
BUN/CREAT BLD: 18 (ref 9–20)
CALCIUM SERPL-MCNC: 9.6 MG/DL (ref 8.6–10.4)
CHLORIDE BLD-SCNC: 101 MMOL/L (ref 98–107)
CHOLESTEROL/HDL RATIO: 2.7
CHOLESTEROL: 130 MG/DL
CO2: 28 MMOL/L (ref 20–31)
CREAT SERPL-MCNC: 0.55 MG/DL (ref 0.5–0.9)
DIFFERENTIAL TYPE: ABNORMAL
EOSINOPHILS RELATIVE PERCENT: 2 % (ref 1–4)
ESTIMATED AVERAGE GLUCOSE: 111 MG/DL
GFR AFRICAN AMERICAN: >60 ML/MIN
GFR NON-AFRICAN AMERICAN: >60 ML/MIN
GFR SERPL CREATININE-BSD FRML MDRD: ABNORMAL ML/MIN/{1.73_M2}
GFR SERPL CREATININE-BSD FRML MDRD: ABNORMAL ML/MIN/{1.73_M2}
GLUCOSE BLD-MCNC: 109 MG/DL (ref 70–99)
HBA1C MFR BLD: 5.5 % (ref 4.8–5.9)
HCT VFR BLD CALC: 40.2 % (ref 36.3–47.1)
HDLC SERPL-MCNC: 48 MG/DL
HEMOGLOBIN: 13.6 G/DL (ref 11.9–15.1)
IMMATURE GRANULOCYTES: 0 %
LDL CHOLESTEROL: 47 MG/DL (ref 0–130)
LYMPHOCYTES # BLD: 37 % (ref 24–43)
MCH RBC QN AUTO: 31.8 PG (ref 25.2–33.5)
MCHC RBC AUTO-ENTMCNC: 33.8 G/DL (ref 25.2–33.5)
MCV RBC AUTO: 93.9 FL (ref 82.6–102.9)
MONOCYTES # BLD: 8 % (ref 3–12)
NRBC AUTOMATED: 0 PER 100 WBC
PDW BLD-RTO: 11.9 % (ref 11.8–14.4)
PLATELET # BLD: 197 K/UL (ref 138–453)
PLATELET ESTIMATE: ABNORMAL
PMV BLD AUTO: 9.7 FL (ref 8.1–13.5)
POTASSIUM SERPL-SCNC: 4.7 MMOL/L (ref 3.7–5.3)
RBC # BLD: 4.28 M/UL (ref 3.95–5.11)
RBC # BLD: ABNORMAL 10*6/UL
SEG NEUTROPHILS: 53 % (ref 36–65)
SEGMENTED NEUTROPHILS ABSOLUTE COUNT: 2.65 K/UL (ref 1.5–8.1)
SODIUM BLD-SCNC: 142 MMOL/L (ref 135–144)
THYROXINE, FREE: 1.44 NG/DL (ref 0.93–1.7)
TOTAL PROTEIN: 6.8 G/DL (ref 6.4–8.3)
TRIGL SERPL-MCNC: 176 MG/DL
TSH SERPL DL<=0.05 MIU/L-ACNC: 0.2 MIU/L (ref 0.3–5)
VLDLC SERPL CALC-MCNC: ABNORMAL MG/DL (ref 1–30)
WBC # BLD: 5.1 K/UL (ref 3.5–11.3)
WBC # BLD: ABNORMAL 10*3/UL

## 2020-07-14 PROCEDURE — 80053 COMPREHEN METABOLIC PANEL: CPT

## 2020-07-14 PROCEDURE — 83036 HEMOGLOBIN GLYCOSYLATED A1C: CPT

## 2020-07-14 PROCEDURE — 99213 OFFICE O/P EST LOW 20 MIN: CPT | Performed by: FAMILY MEDICINE

## 2020-07-14 PROCEDURE — G8427 DOCREV CUR MEDS BY ELIG CLIN: HCPCS | Performed by: FAMILY MEDICINE

## 2020-07-14 PROCEDURE — 36415 COLL VENOUS BLD VENIPUNCTURE: CPT

## 2020-07-14 PROCEDURE — 1123F ACP DISCUSS/DSCN MKR DOCD: CPT | Performed by: FAMILY MEDICINE

## 2020-07-14 PROCEDURE — 4040F PNEUMOC VAC/ADMIN/RCVD: CPT | Performed by: FAMILY MEDICINE

## 2020-07-14 PROCEDURE — G8417 CALC BMI ABV UP PARAM F/U: HCPCS | Performed by: FAMILY MEDICINE

## 2020-07-14 PROCEDURE — 84443 ASSAY THYROID STIM HORMONE: CPT

## 2020-07-14 PROCEDURE — 1090F PRES/ABSN URINE INCON ASSESS: CPT | Performed by: FAMILY MEDICINE

## 2020-07-14 PROCEDURE — 99214 OFFICE O/P EST MOD 30 MIN: CPT | Performed by: FAMILY MEDICINE

## 2020-07-14 PROCEDURE — 84439 ASSAY OF FREE THYROXINE: CPT

## 2020-07-14 PROCEDURE — G8400 PT W/DXA NO RESULTS DOC: HCPCS | Performed by: FAMILY MEDICINE

## 2020-07-14 PROCEDURE — 1036F TOBACCO NON-USER: CPT | Performed by: FAMILY MEDICINE

## 2020-07-14 PROCEDURE — 3017F COLORECTAL CA SCREEN DOC REV: CPT | Performed by: FAMILY MEDICINE

## 2020-07-14 PROCEDURE — 85025 COMPLETE CBC W/AUTO DIFF WBC: CPT

## 2020-07-14 PROCEDURE — 80061 LIPID PANEL: CPT

## 2020-07-14 RX ORDER — CLOPIDOGREL BISULFATE 75 MG/1
TABLET ORAL
Qty: 90 TABLET | Refills: 1 | Status: SHIPPED | OUTPATIENT
Start: 2020-07-14 | End: 2020-07-16 | Stop reason: SDUPTHER

## 2020-07-14 RX ORDER — LEVOTHYROXINE SODIUM 0.12 MG/1
125 TABLET ORAL DAILY
Qty: 90 TABLET | Refills: 1 | Status: SHIPPED | OUTPATIENT
Start: 2020-07-14 | End: 2020-07-15 | Stop reason: DRUGHIGH

## 2020-07-14 RX ORDER — TRIAMCINOLONE ACETONIDE 1 MG/G
CREAM TOPICAL 2 TIMES DAILY
Qty: 80 G | Refills: 1 | Status: SHIPPED | OUTPATIENT
Start: 2020-07-14 | End: 2021-01-14

## 2020-07-14 RX ORDER — ATORVASTATIN CALCIUM 40 MG/1
TABLET, FILM COATED ORAL
Qty: 90 TABLET | Refills: 1 | Status: SHIPPED | OUTPATIENT
Start: 2020-07-14 | End: 2020-07-16 | Stop reason: SDUPTHER

## 2020-07-14 ASSESSMENT — ENCOUNTER SYMPTOMS
EYE REDNESS: 0
DIARRHEA: 0
NAUSEA: 0
SORE THROAT: 0
WHEEZING: 0
CONSTIPATION: 0
RHINORRHEA: 0
ABDOMINAL PAIN: 0
SHORTNESS OF BREATH: 0
COUGH: 0
SINUS PRESSURE: 0
TROUBLE SWALLOWING: 0
VOMITING: 0
EYE DISCHARGE: 0

## 2020-07-14 NOTE — PROGRESS NOTES
2020     Sue James (:  1955) is a 75 y.o. female, here for evaluation of the following medical concerns:    HPI  Patient comes in today for follow up of chronic health issues Patient does report a rash to her bilateral hands and forearms. Was out doing some outside work and had developed the rash. Likely is related to something she came into contact with. She also notes however that in the last couple of weeks she has developed scattered insect bite type lesions primarily to her axillary region around her lower abdomen and groin area and to the inner arms. She states that she has been working outside more but does not recall that she is gotten bit by anything. She states that typically she will wake up with more of the bites. She is checked her bed and does not see any evidence of bedbugs. She does state that her daughter who lives in an apartment did recently find out that somebody in their apartment had bedbugs but she has not been over to her house. She states that her  also has similar bites. They do have cats but have not noticed that any of them have had fleas or have been itching. Has not really used anything on the lesions just is not sure what is causing them. With regards to her chronic health conditions she has a known history of impaired fasting glucose and her blood sugar level is stable and controlled with her current dietary intake. Did encourage continued low-carb/low sugar diet and routine exercise to keep this optimally controlled. Has a known history of hypothyroidism and her thyroid levels are slightly supratherapeutic. Would recommend decreasing her thyroid dose. Has a known history of hyperlipidemia and her cholesterol levels are stable and adequately controlled with her current statin medication. Had a known previous history of CVA but has not had any residual symptoms or issues related to this. Neurologically is stable at this time.   Patient otherwise has no other acute medical concerns to discuss. .   Other review of systems are as noted below. Lab Results   Component Value Date    WBC 5.1 07/14/2020    RBC 4.28 07/14/2020    RBC 0-2 06/12/2017    HGB 13.6 07/14/2020    HCT 40.2 07/14/2020    MCV 93.9 07/14/2020    MCH 31.8 07/14/2020    MCHC 33.8 07/14/2020    RDW 11.9 07/14/2020     07/14/2020    MPV 9.7 07/14/2020       Lab Results   Component Value Date    CHOL 130 07/14/2020    HDL 48 07/14/2020    CHOLHDLRATIO 2.7 07/14/2020    TRIG 176 07/14/2020    VLDL NOT REPORTED 07/14/2020       Lab Results   Component Value Date    TSH 0.20 07/14/2020       Lab Results   Component Value Date     07/14/2020    K 4.7 07/14/2020     07/14/2020    CO2 28 07/14/2020    BUN 10 07/14/2020    CREATININE 0.55 07/14/2020    GLUCOSE 109 07/14/2020    CALCIUM 9.6 07/14/2020       Lab Results   Component Value Date    LABA1C 5.5 07/14/2020        Preventative measures are reviewed today. See health maintenance section for complete preventative plan of care. Did review patient's med list, allergies, social history, fam history, pmhx and pshx today as noted in the record. Review of Systems   Constitutional: Negative for chills, fatigue and fever. HENT: Negative for congestion, ear pain, postnasal drip, rhinorrhea, sinus pressure, sore throat and trouble swallowing. Eyes: Negative for discharge and redness. Respiratory: Negative for cough, shortness of breath and wheezing. Cardiovascular: Negative for chest pain. Gastrointestinal: Negative for abdominal pain, constipation, diarrhea, nausea and vomiting. Genitourinary: Negative for dysuria, flank pain, frequency and urgency. Musculoskeletal: Negative for arthralgias, myalgias and neck pain. Skin: Positive for rash. Negative for wound. Allergic/Immunologic: Negative for environmental allergies. Neurological: Negative for dizziness, weakness, light-headedness and headaches. Hematological: Negative for adenopathy. Psychiatric/Behavioral: Negative. Prior to Visit Medications    Medication Sig Taking? Authorizing Provider   topiramate (TOPAMAX) 50 MG tablet Take 1 tablet by mouth nightly  Kenny Rivero DO   predniSONE (DELTASONE) 20 MG tablet 1 bid for 3 days, 1 qd for 4 days  Kenny Rivero DO   clopidogrel (PLAVIX) 75 MG tablet take 1 tablet by mouth once daily  Kenny Rivero,    atorvastatin (LIPITOR) 40 MG tablet take 1 tablet by mouth at bedtime  Kenny Rivero DO   levothyroxine (SYNTHROID) 125 MCG tablet Take 1 tablet by mouth Daily  Kenny Rivero DO   Probiotic Product (PROBIOTIC DAILY PO) Take by mouth  Historical Provider, MD   aspirin 81 MG tablet Take 81 mg by mouth daily  Historical Provider, MD        Social History     Tobacco Use    Smoking status: Former Smoker     Packs/day: 2.00     Years: 25.00     Pack years: 50.00     Types: Cigarettes     Last attempt to quit: 2001     Years since quittin.1    Smokeless tobacco: Never Used    Tobacco comment: quit in    Substance Use Topics    Alcohol use: Yes     Alcohol/week: 0.0 standard drinks     Comment: socia, glass of wine at night        There were no vitals filed for this visit. Estimated body mass index is 26.43 kg/m² as calculated from the following:    Height as of 6/3/20: 5' 4\" (1.626 m). Weight as of 6/3/20: 154 lb (69.9 kg). Physical Exam  Vitals signs and nursing note reviewed. Constitutional:       General: She is not in acute distress. Appearance: Normal appearance. She is well-developed. She is not diaphoretic. HENT:      Head: Normocephalic and atraumatic. Right Ear: Tympanic membrane, ear canal and external ear normal.      Left Ear: Tympanic membrane, ear canal and external ear normal.      Nose: Nose normal.      Mouth/Throat:      Mouth: Mucous membranes are moist.      Pharynx: Oropharynx is clear. No oropharyngeal exudate.    Eyes: General:         Right eye: No discharge. Left eye: No discharge. Conjunctiva/sclera: Conjunctivae normal.      Pupils: Pupils are equal, round, and reactive to light. Neck:      Musculoskeletal: Normal range of motion and neck supple. Thyroid: No thyromegaly. Cardiovascular:      Rate and Rhythm: Normal rate and regular rhythm. Heart sounds: Normal heart sounds. Pulmonary:      Effort: Pulmonary effort is normal.      Breath sounds: Normal breath sounds. No wheezing or rales. Abdominal:      General: Bowel sounds are normal. There is no distension. Palpations: Abdomen is soft. Tenderness: There is no abdominal tenderness. Lymphadenopathy:      Cervical: No cervical adenopathy. Skin:     General: Skin is warm and dry. Findings: Erythema and rash present. Comments: Hands and forearms have scattered erythematous vesicular patches present that are consistent with an allergic contact reaction. On her bilateral inner elbows and axillary regions she has small circular excoriated raised areas consistent with insect bites. Neurological:      Mental Status: She is alert and oriented to person, place, and time. Psychiatric:         Behavior: Behavior normal.         Thought Content: Thought content normal.         Judgment: Judgment normal.         ASSESSMENT/PLAN:  Encounter Diagnoses   Name Primary?     Allergic contact dermatitis due to plants, except food Yes    Impaired fasting glucose     Acquired hypothyroidism     Mixed hyperlipidemia     Cerebrovascular accident (CVA) due to thrombosis of left vertebral artery (HCC)      Orders Placed This Encounter   Medications    clopidogrel (PLAVIX) 75 MG tablet     Sig: take 1 tablet by mouth once daily     Dispense:  90 tablet     Refill:  1    atorvastatin (LIPITOR) 40 MG tablet     Sig: take 1 tablet by mouth at bedtime     Dispense:  90 tablet     Refill:  1     levothyroxine (SYNTHROID) 112 MCG tablet Sig: Take 1 tablet by mouth Daily     Dispense:  90 tablet     Refill:  1    zoster recombinant adjuvanted vaccine (SHINGRIX) 50 MCG/0.5ML SUSR injection     Sig: Inject 0.5 mLs into the muscle once for 1 dose     Dispense:  0.5 mL     Refill:  1    triamcinolone (KENALOG) 0.1 % cream     Sig: Apply topically 2 times daily     Dispense:  80 g     Refill:  1     Orders Placed This Encounter   Procedures    CBC Auto Differential     Standing Status:   Future     Standing Expiration Date:   7/14/2021    Comprehensive Metabolic Panel     Standing Status:   Future     Standing Expiration Date:   7/14/2021    Hemoglobin A1C     Standing Status:   Future     Standing Expiration Date:   7/14/2021    Lipid Panel     Standing Status:   Future     Standing Expiration Date:   7/14/2021     Order Specific Question:   Is Patient Fasting?/# of Hours     Answer:   12 hours    TSH without Reflex     Standing Status:   Future     Standing Expiration Date:   7/14/2021    T4, Free     Standing Status:   Future     Standing Expiration Date:   7/14/2021     With regards to the contact dermatitis I did give her triamcinolone cream to utilize to the areas which should help with the rash. The other area of rash appears to be insect bites. I advised her this could be flea bites if she does have cats and she should check her cats for fleas or possibly bedbugs but she is checked her mattress and has not seen any type of bedbugs. Could be also some sort of bite from her working outside quite a bit this summer. Nonetheless this would be treated symptomatically and she would need to find the source of the bites if they continue. Patient is to adjust her thyroid dose. Did decrease her dose to 112 mcg daily. Patient is to continue on the rest of her current medical therapy. No additional changes are made at this time.     Patient is to continue to follow a low-carb/low sugar/low-fat diet and increase exercise for optimal blood sugar and cholesterol control. Patient is to return to my office in 6 months duration or sooner if any further problems or symptoms arise. (Please note that portions of this note were completed with a voice recognition program. Efforts were made to edit the dictations but occasionally words are mis-transcribed.)        No follow-ups on file. An electronic signature was used to authenticate this note.     --Amrita Obrien,  on 7/14/2020 at 8:26 AM

## 2020-07-15 RX ORDER — LEVOTHYROXINE SODIUM 112 UG/1
112 TABLET ORAL DAILY
Qty: 90 TABLET | Refills: 1 | Status: SHIPPED | OUTPATIENT
Start: 2020-07-15 | End: 2020-07-16 | Stop reason: SDUPTHER

## 2020-07-16 ENCOUNTER — TELEPHONE (OUTPATIENT)
Dept: FAMILY MEDICINE CLINIC | Age: 65
End: 2020-07-16

## 2020-07-16 RX ORDER — CLOPIDOGREL BISULFATE 75 MG/1
TABLET ORAL
Qty: 90 TABLET | Refills: 1 | Status: SHIPPED | OUTPATIENT
Start: 2020-07-16 | End: 2021-01-14 | Stop reason: SDUPTHER

## 2020-07-16 RX ORDER — ATORVASTATIN CALCIUM 40 MG/1
TABLET, FILM COATED ORAL
Qty: 90 TABLET | Refills: 1 | Status: SHIPPED | OUTPATIENT
Start: 2020-07-16 | End: 2021-01-14 | Stop reason: SDUPTHER

## 2020-07-16 RX ORDER — LEVOTHYROXINE SODIUM 112 UG/1
112 TABLET ORAL DAILY
Qty: 90 TABLET | Refills: 1 | Status: SHIPPED | OUTPATIENT
Start: 2020-07-16 | End: 2021-01-14 | Stop reason: SDUPTHER

## 2020-07-16 NOTE — TELEPHONE ENCOUNTER
Pt calling questioning how it will affect her if she stops taking her lipitor and plavix, states she's without drug coverage at this time and can't really afford these meds, please advise at above number.

## 2020-07-16 NOTE — TELEPHONE ENCOUNTER
Bernetta Hamman called requesting a refill of the below medication which has been pended for you: patient can get medications for $6 -$12 for 90 day supply if she goes online to [employer] and applies for [employer]. She is made aware of this and would like scripts sent.      Requested Prescriptions     Pending Prescriptions Disp Refills    levothyroxine (SYNTHROID) 112 MCG tablet 90 tablet 1     Sig: Take 1 tablet by mouth daily    clopidogrel (PLAVIX) 75 MG tablet 90 tablet 1     Sig: take 1 tablet by mouth once daily    atorvastatin (LIPITOR) 40 MG tablet 90 tablet 1     Sig: take 1 tablet by mouth at bedtime       Last Appointment Date: 7/14/2020  Next Appointment Date: 1/14/2021    No Known Allergies

## 2021-01-14 ENCOUNTER — OFFICE VISIT (OUTPATIENT)
Dept: FAMILY MEDICINE CLINIC | Age: 66
End: 2021-01-14
Payer: MEDICARE

## 2021-01-14 ENCOUNTER — HOSPITAL ENCOUNTER (OUTPATIENT)
Dept: LAB | Age: 66
Discharge: HOME | End: 2021-01-14
Payer: MEDICARE

## 2021-01-14 VITALS
HEART RATE: 97 BPM | SYSTOLIC BLOOD PRESSURE: 128 MMHG | BODY MASS INDEX: 25.27 KG/M2 | RESPIRATION RATE: 12 BRPM | DIASTOLIC BLOOD PRESSURE: 84 MMHG | HEIGHT: 64 IN | WEIGHT: 148 LBS | OXYGEN SATURATION: 97 %

## 2021-01-14 DIAGNOSIS — M79.18 GLUTEAL PAIN: ICD-10-CM

## 2021-01-14 DIAGNOSIS — Z78.0 MENOPAUSE: ICD-10-CM

## 2021-01-14 DIAGNOSIS — Z23 NEED FOR SHINGLES VACCINE: ICD-10-CM

## 2021-01-14 DIAGNOSIS — E78.2 MIXED HYPERLIPIDEMIA: ICD-10-CM

## 2021-01-14 DIAGNOSIS — R73.01 IMPAIRED FASTING GLUCOSE: ICD-10-CM

## 2021-01-14 DIAGNOSIS — R73.01 IMPAIRED FASTING GLUCOSE: Primary | ICD-10-CM

## 2021-01-14 DIAGNOSIS — I63.012 CEREBROVASCULAR ACCIDENT (CVA) DUE TO THROMBOSIS OF LEFT VERTEBRAL ARTERY (HCC): ICD-10-CM

## 2021-01-14 DIAGNOSIS — I35.1 MODERATE AORTIC REGURGITATION: ICD-10-CM

## 2021-01-14 DIAGNOSIS — E03.9 ACQUIRED HYPOTHYROIDISM: ICD-10-CM

## 2021-01-14 PROBLEM — G81.94 LEFT HEMIPARESIS (HCC): Status: RESOLVED | Noted: 2021-01-14 | Resolved: 2021-01-14

## 2021-01-14 PROBLEM — G81.94 LEFT HEMIPARESIS (HCC): Status: ACTIVE | Noted: 2021-01-14

## 2021-01-14 LAB
ABSOLUTE EOS #: 0.13 K/UL (ref 0–0.44)
ABSOLUTE IMMATURE GRANULOCYTE: <0.03 K/UL (ref 0–0.3)
ABSOLUTE LYMPH #: 2.07 K/UL (ref 1.1–3.7)
ABSOLUTE MONO #: 0.31 K/UL (ref 0.1–1.2)
ALBUMIN SERPL-MCNC: 4.9 G/DL (ref 3.5–5.2)
ALBUMIN/GLOBULIN RATIO: 2 (ref 1–2.5)
ALP BLD-CCNC: 105 U/L (ref 35–104)
ALT SERPL-CCNC: 21 U/L (ref 5–33)
ANION GAP SERPL CALCULATED.3IONS-SCNC: 10 MMOL/L (ref 9–17)
AST SERPL-CCNC: 20 U/L
BASOPHILS # BLD: 0 % (ref 0–2)
BASOPHILS ABSOLUTE: <0.03 K/UL (ref 0–0.2)
BILIRUB SERPL-MCNC: 1.06 MG/DL (ref 0.3–1.2)
BUN BLDV-MCNC: 16 MG/DL (ref 8–23)
BUN/CREAT BLD: 26 (ref 9–20)
CALCIUM SERPL-MCNC: 9.9 MG/DL (ref 8.6–10.4)
CHLORIDE BLD-SCNC: 104 MMOL/L (ref 98–107)
CHOLESTEROL/HDL RATIO: 2.7
CHOLESTEROL: 161 MG/DL
CO2: 28 MMOL/L (ref 20–31)
CREAT SERPL-MCNC: 0.62 MG/DL (ref 0.5–0.9)
DIFFERENTIAL TYPE: ABNORMAL
EOSINOPHILS RELATIVE PERCENT: 3 % (ref 1–4)
ESTIMATED AVERAGE GLUCOSE: 111 MG/DL
GFR AFRICAN AMERICAN: >60 ML/MIN
GFR NON-AFRICAN AMERICAN: >60 ML/MIN
GFR SERPL CREATININE-BSD FRML MDRD: ABNORMAL ML/MIN/{1.73_M2}
GFR SERPL CREATININE-BSD FRML MDRD: ABNORMAL ML/MIN/{1.73_M2}
GLUCOSE BLD-MCNC: 111 MG/DL (ref 70–99)
HBA1C MFR BLD: 5.5 % (ref 4–6)
HCT VFR BLD CALC: 44.5 % (ref 36.3–47.1)
HDLC SERPL-MCNC: 59 MG/DL
HEMOGLOBIN: 14.7 G/DL (ref 11.9–15.1)
IMMATURE GRANULOCYTES: 0 %
LDL CHOLESTEROL: 65 MG/DL (ref 0–130)
LYMPHOCYTES # BLD: 46 % (ref 24–43)
MCH RBC QN AUTO: 31.7 PG (ref 25.2–33.5)
MCHC RBC AUTO-ENTMCNC: 33 G/DL (ref 25.2–33.5)
MCV RBC AUTO: 95.9 FL (ref 82.6–102.9)
MONOCYTES # BLD: 7 % (ref 3–12)
NRBC AUTOMATED: 0 PER 100 WBC
PDW BLD-RTO: 11.9 % (ref 11.8–14.4)
PLATELET # BLD: 203 K/UL (ref 138–453)
PLATELET ESTIMATE: ABNORMAL
PMV BLD AUTO: 10 FL (ref 8.1–13.5)
POTASSIUM SERPL-SCNC: 4.5 MMOL/L (ref 3.7–5.3)
RBC # BLD: 4.64 M/UL (ref 3.95–5.11)
RBC # BLD: ABNORMAL 10*6/UL
SEG NEUTROPHILS: 44 % (ref 36–65)
SEGMENTED NEUTROPHILS ABSOLUTE COUNT: 1.99 K/UL (ref 1.5–8.1)
SODIUM BLD-SCNC: 142 MMOL/L (ref 135–144)
THYROXINE, FREE: 1.13 NG/DL (ref 0.93–1.7)
TOTAL PROTEIN: 7.3 G/DL (ref 6.4–8.3)
TRIGL SERPL-MCNC: 187 MG/DL
TSH SERPL DL<=0.05 MIU/L-ACNC: 2.83 MIU/L (ref 0.3–5)
VLDLC SERPL CALC-MCNC: ABNORMAL MG/DL (ref 1–30)
WBC # BLD: 4.5 K/UL (ref 3.5–11.3)
WBC # BLD: ABNORMAL 10*3/UL

## 2021-01-14 PROCEDURE — 1036F TOBACCO NON-USER: CPT | Performed by: FAMILY MEDICINE

## 2021-01-14 PROCEDURE — 84439 ASSAY OF FREE THYROXINE: CPT

## 2021-01-14 PROCEDURE — G8484 FLU IMMUNIZE NO ADMIN: HCPCS | Performed by: FAMILY MEDICINE

## 2021-01-14 PROCEDURE — G8400 PT W/DXA NO RESULTS DOC: HCPCS | Performed by: FAMILY MEDICINE

## 2021-01-14 PROCEDURE — 85025 COMPLETE CBC W/AUTO DIFF WBC: CPT

## 2021-01-14 PROCEDURE — 99214 OFFICE O/P EST MOD 30 MIN: CPT | Performed by: FAMILY MEDICINE

## 2021-01-14 PROCEDURE — 84443 ASSAY THYROID STIM HORMONE: CPT

## 2021-01-14 PROCEDURE — 80053 COMPREHEN METABOLIC PANEL: CPT

## 2021-01-14 PROCEDURE — G8417 CALC BMI ABV UP PARAM F/U: HCPCS | Performed by: FAMILY MEDICINE

## 2021-01-14 PROCEDURE — 99212 OFFICE O/P EST SF 10 MIN: CPT

## 2021-01-14 PROCEDURE — 3017F COLORECTAL CA SCREEN DOC REV: CPT | Performed by: FAMILY MEDICINE

## 2021-01-14 PROCEDURE — 80061 LIPID PANEL: CPT

## 2021-01-14 PROCEDURE — 36415 COLL VENOUS BLD VENIPUNCTURE: CPT

## 2021-01-14 PROCEDURE — G8427 DOCREV CUR MEDS BY ELIG CLIN: HCPCS | Performed by: FAMILY MEDICINE

## 2021-01-14 PROCEDURE — 1123F ACP DISCUSS/DSCN MKR DOCD: CPT | Performed by: FAMILY MEDICINE

## 2021-01-14 PROCEDURE — 1090F PRES/ABSN URINE INCON ASSESS: CPT | Performed by: FAMILY MEDICINE

## 2021-01-14 PROCEDURE — 83036 HEMOGLOBIN GLYCOSYLATED A1C: CPT

## 2021-01-14 PROCEDURE — 4040F PNEUMOC VAC/ADMIN/RCVD: CPT | Performed by: FAMILY MEDICINE

## 2021-01-14 RX ORDER — CLOPIDOGREL BISULFATE 75 MG/1
TABLET ORAL
Qty: 90 TABLET | Refills: 1 | Status: SHIPPED | OUTPATIENT
Start: 2021-01-14 | End: 2021-08-03 | Stop reason: SDUPTHER

## 2021-01-14 RX ORDER — LEVOTHYROXINE SODIUM 112 UG/1
112 TABLET ORAL DAILY
Qty: 90 TABLET | Refills: 1 | Status: SHIPPED | OUTPATIENT
Start: 2021-01-14 | End: 2021-08-03 | Stop reason: SDUPTHER

## 2021-01-14 RX ORDER — ATORVASTATIN CALCIUM 40 MG/1
TABLET, FILM COATED ORAL
Qty: 90 TABLET | Refills: 1 | Status: SHIPPED | OUTPATIENT
Start: 2021-01-14 | End: 2021-08-03 | Stop reason: SDUPTHER

## 2021-01-14 SDOH — HEALTH STABILITY: MENTAL HEALTH: HOW OFTEN DO YOU HAVE A DRINK CONTAINING ALCOHOL?: 4 OR MORE TIMES A WEEK

## 2021-01-14 SDOH — HEALTH STABILITY: MENTAL HEALTH: HOW MANY STANDARD DRINKS CONTAINING ALCOHOL DO YOU HAVE ON A TYPICAL DAY?: 1 OR 2

## 2021-01-14 ASSESSMENT — ENCOUNTER SYMPTOMS
DIARRHEA: 0
RHINORRHEA: 0
EYE DISCHARGE: 0
ABDOMINAL PAIN: 0
VOMITING: 0
COUGH: 0
SHORTNESS OF BREATH: 0
TROUBLE SWALLOWING: 0
NAUSEA: 0
SORE THROAT: 0
CONSTIPATION: 0
WHEEZING: 0
SINUS PRESSURE: 0
EYE REDNESS: 0

## 2021-01-14 ASSESSMENT — PATIENT HEALTH QUESTIONNAIRE - PHQ9
SUM OF ALL RESPONSES TO PHQ QUESTIONS 1-9: 0
SUM OF ALL RESPONSES TO PHQ9 QUESTIONS 1 & 2: 0
1. LITTLE INTEREST OR PLEASURE IN DOING THINGS: 0

## 2021-01-14 NOTE — PROGRESS NOTES
2021     Jake Logan (:  1955) is a 77 y.o. female, here for evaluation of the following medical concerns:    HPI Patient comes in today for follow up of chronic health issues Patient does note that she is having pain in the left gluteal region similar to what she had on the right side last year. She does do a lot of bending and lifting and so unclear if perhaps over time this has caused her to have recurrent pain. Does have pain with sitting. Able to ambulate without significant difficulty. When she had this last year she ultimately did dry needling which seemed to finally clear up the ongoing pain issues. At this point we can refer her back for dry needling therapy. She did have her lab work this morning so we are still pending some results. Has a known history of impaired fasting glucose her blood sugar has remained relatively stable and controlled with dietary efforts. We will assess her status today with updated lab work and make further recommendations based on test reports but would continue with a low-carb/low sugar diet and routine exercise to keep this optimally controlled. Has a known history of hyperlipidemia which has been stable and controlled on her current statin dose. Does have known hypothyroidism and her thyroid levels have remained stable and controlled with her current thyroid dosing. We will check her status today with her lab work when available. Had a history of CVA in the past and is stable at this time. Does continue on Plavix therapy. Has a known history of moderate aortic regurgitation and is due for repeat echo which we can schedule at this time. Patient otherwise has no other acute medical concerns. She did make note to me today that she did follow-up through a video visit with the cardiologist regarding the loop recorder that she has in place. Apparently a couple weeks before seeing the cardiologist via video visit she did get a call from the monitoring facility that noted that she had some sort of spike noted but then did not hear anything more prominent.   She discussed this with the cardiologist and she was going to check on it further but then she never heard anything back from them. As she has never had any issues and I did advise her that the loop recorder which is stay in place unless she had issues to decided to go ahead and just turn off the monitoring system as she has not had any issues over 3 years and was getting a continued monthly bill for this without any follow-up so at this point no further intervention is going to be performed regarding the loop recorder. .  Patient's recent lab reports are as follows:    Results for orders placed or performed during the hospital encounter of 07/14/20   T4, Free   Result Value Ref Range    Thyroxine, Free 1.44 0.93 - 1.70 ng/dL   TSH without Reflex   Result Value Ref Range    TSH 0.20 (L) 0.30 - 5.00 mIU/L   Lipid Panel   Result Value Ref Range    Cholesterol 130 <200 mg/dL    HDL 48 >40 mg/dL    LDL Cholesterol 47 0 - 130 mg/dL    Chol/HDL Ratio 2.7 <5    Triglycerides 176 (H) <150 mg/dL    VLDL NOT REPORTED (H) 1 - 30 mg/dL   Hemoglobin A1C   Result Value Ref Range    Hemoglobin A1C 5.5 4.8 - 5.9 %    Estimated Avg Glucose 111 mg/dL   Comprehensive Metabolic Panel   Result Value Ref Range    Glucose 109 (H) 70 - 99 mg/dL    BUN 10 8 - 23 mg/dL    CREATININE 0.55 0.50 - 0.90 mg/dL    Bun/Cre Ratio 18 9 - 20    Calcium 9.6 8.6 - 10.4 mg/dL    Sodium 142 135 - 144 mmol/L    Potassium 4.7 3.7 - 5.3 mmol/L    Chloride 101 98 - 107 mmol/L    CO2 28 20 - 31 mmol/L    Anion Gap 13 9 - 17 mmol/L    Alkaline Phosphatase 128 (H) 35 - 104 U/L    ALT 26 5 - 33 U/L    AST 19 <32 U/L    Total Bilirubin 1.03 0.3 - 1.2 mg/dL    Total Protein 6.8 6.4 - 8.3 g/dL    Alb 4.6 3.5 - 5.2 g/dL    Albumin/Globulin Ratio 2.1 1.0 - 2.5    GFR Non-African American >60 >60 mL/min    GFR African American >60 >60 mL/min    GFR Comment          GFR Staging NOT REPORTED    CBC Auto Differential   Result Value Ref Range    WBC 5.1 3.5 - 11.3 k/uL RBC 4.28 3.95 - 5.11 m/uL    Hemoglobin 13.6 11.9 - 15.1 g/dL    Hematocrit 40.2 36.3 - 47.1 %    MCV 93.9 82.6 - 102.9 fL    MCH 31.8 25.2 - 33.5 pg    MCHC 33.8 (H) 25.2 - 33.5 g/dL    RDW 11.9 11.8 - 14.4 %    Platelets 498 490 - 132 k/uL    MPV 9.7 8.1 - 13.5 fL    NRBC Automated 0.0 0.0 per 100 WBC    Differential Type NOT REPORTED     Seg Neutrophils 53 36 - 65 %    Lymphocytes 37 24 - 43 %    Monocytes 8 3 - 12 %    Eosinophils % 2 1 - 4 %    Basophils 0 0 - 2 %    Immature Granulocytes 0 0 %    Segs Absolute 2.65 1.50 - 8.10 k/uL    Absolute Lymph # 1.88 1.10 - 3.70 k/uL    Absolute Mono # 0.41 0.10 - 1.20 k/uL    Absolute Eos # 0.11 0.00 - 0.44 k/uL    Basophils Absolute <0.03 0.00 - 0.20 k/uL    Absolute Immature Granulocyte <0.03 0.00 - 0.30 k/uL    WBC Morphology NOT REPORTED     RBC Morphology NOT REPORTED     Platelet Estimate NOT REPORTED       Other review of systems are as noted below. Preventative measures are reviewed today. See health maintenance section for complete preventative plan of care. Did review patient's med list, allergies, social history, fam history, pmhx and pshx today as noted in the record. Review of Systems   Constitutional: Negative for chills, fatigue and fever. HENT: Negative for congestion, ear pain, postnasal drip, rhinorrhea, sinus pressure, sore throat and trouble swallowing. Eyes: Negative for discharge and redness. Respiratory: Negative for cough, shortness of breath and wheezing. Cardiovascular: Negative for chest pain. Gastrointestinal: Negative for abdominal pain, constipation, diarrhea, nausea and vomiting. Genitourinary: Negative for dysuria, flank pain, frequency and urgency. Musculoskeletal: Positive for myalgias (gluteal pain). Negative for arthralgias and neck pain. Skin: Negative for rash and wound. Allergic/Immunologic: Negative for environmental allergies. Neurological: Negative for dizziness, weakness, light-headedness and headaches. Hematological: Negative for adenopathy. Psychiatric/Behavioral: Negative. Prior to Visit Medications    Medication Sig Taking? Authorizing Provider   levothyroxine (SYNTHROID) 112 MCG tablet Take 1 tablet by mouth daily  Leonor Otero DO   clopidogrel (PLAVIX) 75 MG tablet take 1 tablet by mouth once daily  Leonor Otero DO   atorvastatin (LIPITOR) 40 MG tablet take 1 tablet by mouth at bedtime  Leonor Otero DO   triamcinolone (KENALOG) 0.1 % cream Apply topically 2 times daily  Aileen Bautista DO   Probiotic Product (PROBIOTIC DAILY PO) Take by mouth  Historical Provider, MD   aspirin 81 MG tablet Take 81 mg by mouth daily  Historical Provider, MD        Social History     Tobacco Use    Smoking status: Former Smoker     Packs/day: 2.00     Years: 25.00     Pack years: 50.00     Types: Cigarettes     Quit date: 2001     Years since quittin.6    Smokeless tobacco: Never Used    Tobacco comment: quit in    Substance Use Topics    Alcohol use: Yes     Alcohol/week: 0.0 standard drinks     Comment: socia, glass of wine at night        There were no vitals filed for this visit. Estimated body mass index is 26.09 kg/m² as calculated from the following:    Height as of 20: 5' 4\" (1.626 m). Weight as of 20: 152 lb (68.9 kg). Physical Exam  Vitals signs and nursing note reviewed. Constitutional:       General: She is not in acute distress. Appearance: Normal appearance. She is well-developed. She is not diaphoretic. HENT:      Head: Normocephalic and atraumatic. Right Ear: Tympanic membrane, ear canal and external ear normal.      Left Ear: Tympanic membrane, ear canal and external ear normal.      Nose: Nose normal.      Mouth/Throat:      Mouth: Mucous membranes are moist.      Pharynx: Oropharynx is clear. No oropharyngeal exudate.    Eyes:      General: Right eye: No discharge. Left eye: No discharge. Conjunctiva/sclera: Conjunctivae normal.      Pupils: Pupils are equal, round, and reactive to light. Neck:      Musculoskeletal: Normal range of motion and neck supple. Thyroid: No thyromegaly. Cardiovascular:      Rate and Rhythm: Normal rate and regular rhythm. Heart sounds: Murmur present. Pulmonary:      Effort: Pulmonary effort is normal.      Breath sounds: Normal breath sounds. No wheezing or rales. Abdominal:      General: Bowel sounds are normal. There is no distension. Palpations: Abdomen is soft. Tenderness: There is no abdominal tenderness. Lymphadenopathy:      Cervical: No cervical adenopathy. Skin:     General: Skin is warm and dry. Findings: No rash. Neurological:      Mental Status: She is alert and oriented to person, place, and time. Psychiatric:         Behavior: Behavior normal.         Thought Content: Thought content normal.         Judgment: Judgment normal.           ASSESSMENT/PLAN:  Encounter Diagnoses   Name Primary? 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  clopidogrel (PLAVIX) 75 MG tablet     Sig: take 1 tablet by mouth once daily     Dispense:  90 tablet     Refill:  1     Patient signing up for savings card online as she has no insurance.  atorvastatin (LIPITOR) 40 MG tablet     Sig: take 1 tablet by mouth at bedtime     Dispense:  90 tablet     Refill:  1     Patient signing up for savings card online as she has no insurance. Orders Placed This Encounter   Procedures    DEXA AXIAL SKELETON W VERTEBRAL FX ASST     Standing Status:   Future     Standing Expiration Date:   1/14/2022    CBC Auto Differential     Standing Status:   Future     Standing Expiration Date:   1/14/2022    Comprehensive Metabolic Panel     Standing Status:   Future     Standing Expiration Date:   1/14/2022    Hemoglobin A1C     Standing Status:   Future     Standing Expiration Date:   1/14/2022    Lipid Panel     Standing Status:   Future     Standing Expiration Date:   1/14/2022     Order Specific Question:   Is Patient Fasting?/# of Hours     Answer:   12 hours    TSH without Reflex     Standing Status:   Future     Standing Expiration Date:   1/14/2022    T4, Free     Standing Status:   Future     Standing Expiration Date:   1/14/2022   [facility] [place]     Referral Priority:   Routine     Referral Type:   Eval and Treat     Referral Reason:   Specialty Services Required     Requested Specialty:   Physical Therapy     Number of Visits Requested:   1     Will refer patient to physical therapy for dry needling. Has recurrent gluteal pain similar to previous episode. Dry needling did provide her with benefit. Pain seems to be more located over the ischium. Likely hamstring tightness contributing to symptoms. May need some stretching exercises as well. Patient is to continue on the rest of her current medical therapy. No changes are made at this time. Will await her lab results today and make any further changes if necessary based on testing reports. Patient is to have a 2D echo as previously ordered. Will schedule for patient at this time. Patient is to continue to follow a low-carb/low sugar/low-fat diet and increase exercise for optimal blood sugar and cholesterol control. Patient is to return to my office in 6 months duration or sooner if any further problems or symptoms arise. (Please note that portions of this note were completed with a voice recognition program. Efforts were made to edit the dictations but occasionally words are mis-transcribed.)        No follow-ups on file. An electronic signature was used to authenticate this note.     --Von Carrero,  on 1/14/2021 at 7:22 AM

## 2021-01-19 ENCOUNTER — HOSPITAL ENCOUNTER (OUTPATIENT)
Dept: PHYSICAL THERAPY | Age: 66
Setting detail: THERAPIES SERIES
Discharge: HOME | End: 2021-01-19
Payer: MEDICARE

## 2021-01-19 PROCEDURE — 97140 MANUAL THERAPY 1/> REGIONS: CPT

## 2021-01-19 PROCEDURE — 97161 PT EVAL LOW COMPLEX 20 MIN: CPT

## 2021-01-19 ASSESSMENT — PAIN DESCRIPTION - PAIN TYPE: TYPE: CHRONIC PAIN

## 2021-01-19 ASSESSMENT — PAIN DESCRIPTION - DESCRIPTORS: DESCRIPTORS: TIGHTNESS

## 2021-01-19 ASSESSMENT — PAIN DESCRIPTION - ORIENTATION: ORIENTATION: LEFT

## 2021-01-19 ASSESSMENT — PAIN DESCRIPTION - LOCATION: LOCATION: BACK;BUTTOCKS

## 2021-01-19 NOTE — PROGRESS NOTES
Physical Therapy  Initial Assessment  Date: 2021  Patient Name: Lluvia Walton  MRN: [id number]  : 1955          Restrictions       Subjective   General  Chart Reviewed: Yes  Patient assessed for rehabilitation services?: Yes  Referring Practitioner: Leonor Otero DO  Referral Date : 21  Diagnosis: M79.18 (ICD-10-CM) - Gluteal pain  PT Visit Information  PT Insurance Information: Medicare/ [employer]  Subjective  Subjective: Pain 6/10 this date. Patinet noting Left Glut, has difficulty with sitting for prolonged epriods, difficlut with hard chairs. Patient noting had this last year with good relief with use of IDN. Pain Screening  Patient Currently in Pain: Yes  Pain Assessment  Pain Assessment: 0-10  Pain Level: 7  Pain Type: Chronic pain  Pain Location: Back;Buttocks  Pain Orientation: Left  Pain Descriptors: Tightness  Pain Frequency: Continuous  Clinical Progression: Gradually worsening  Vital Signs  Patient Currently in Pain: Yes    Vision/Hearing  Vision  Vision: Within Functional Limits  Hearing  Hearing: Within functional limits    Orientation  Orientation  Overall Orientation Status: Within Functional Limits    Social/Functional History  Social/Functional History  Lives With: Spouse  Type of Home: House  ADL Assistance: Independent  Homemaking Assistance: Independent  Homemaking Responsibilities: Yes  Ambulation Assistance: Independent  Transfer Assistance: Independent    Objective     Observation/Palpation  Posture: Fair  Palpation: mild to moderate tightness of the Left glut into the hamstring insertion. Left PSIS high with sacral torsion noted. Spine  Lumbar: flexion to lower legs, ext approx 10 with increased low back pain noted. bialteral rotation WFL with pain noted Left rotation.     Strength RLE  Strength RLE: Exception  R Hip Flexion: 5/5  R Hip ABduction: 5/5  R Hip ADduction: 5/5  R Knee Flexion: 5/5  R Knee Extension: 5/5  Strength LLE  Strength LLE: Exception L Hip Flexion: 4+/5  L Hip ABduction: 4+/5  L Hip ADduction: 5/5  L Knee Flexion: 4+/5  L Knee Extension: 4+/5     Additional Measures  Special Tests: prone lying/pressup with centralization, -SLR                Ambulation  Ambulation?: Yes  Ambulation 1  Surface: level tile  Device: No Device  Assistance: Independent  Gait Deviations: None  Balance  Posture: Fair  Sitting - Static: Good  Sitting - Dynamic: Good  Standing - Static: Good  Standing - Dynamic: Fair;+                         Assessment   Conditions Requiring Skilled Therapeutic Intervention  Body structures, Functions, Activity limitations: Decreased functional mobility ; Decreased endurance;Decreased ADL status; Increased pain;Decreased strength  Assessment: Patient to the clinic with gluteal pain, radicaulr in nature. Decreased sitting tolerance and strength noted limiting ADL's. Prognosis: Good  Decision Making: Low Complexity  REQUIRES PT FOLLOW UP: Yes  Activity Tolerance  Activity Tolerance: Patient Tolerated treatment well         Plan   Plan  Times per week: 2-3x/week  Plan weeks: 6 weeks  Current Treatment Recommendations: Strengthening, Home Exercise Program, Neuromuscular Re-education, Integrated Dry Needling, ROM, Manual Therapy - Soft Tissue Mobilization, Safety Education & Training, Balance Training, Endurance Training, Patient/Caregiver Education & Training, Manual Therapy - Joint Manipulation, Functional Mobility Training, Transfer Training, Gait Training, Modalities, Stair training, Pain Management    G-Code       OutComes Score                                                  AM-PAC Score             Goals  Short term goals  Time Frame for Short term goals: 3 weeks  Short term goal 1: Initiate HEP  Long term goals  Time Frame for Long term goals : 6 weeks  Long term goal 1: Indpendent in HEP  Long term goal 2: Improve LE strength to 5/5 to ease gait and transfers  Long term goal 3: Patient to note 50% reduction in radicular symptoms. Long term goal 4: Patient to be able to sit up to 45 min without increase in LE pain.   Patient Goals   Patient goals : Decrease Pain       Therapy Time   Individual Concurrent Group Co-treatment   Time In 0910         Time Out 0945         Minutes 35         Timed Code Treatment Minutes: 8 Minutes       Sruthi Pierre PT

## 2021-01-19 NOTE — PLAN OF CARE
[facility]    [x] [place]  Phone: [phone]  Fax: [phone]      [] [place]  Phone: [phone]  Fax: [phone]        To: Referring Practitioner: Sandie Angelo DO      Patient: Niya Turner  : 1955   MRN: [id number]  Evaluation Date: 2021      Diagnosis Information:  · Diagnosis: M79.18 (ICD-10-CM) - Gluteal pain   ·       Physical Therapy Certification  Dear Sandie Angelo DO  The following patient has been evaluated for physical therapy services and for therapy to continue, Medicare requires monthly physician review of the treatment plan. Please review the attached evaluation and/or summary of the patient's plan of care, and verify that you agree therapy should continue by signing the attached document and sending it back to our office. Plan of Care/Treatment to date:  [x] Therapeutic Exercise    [x] Modalities:  [x] Therapeutic Activity     [] Ultrasound  [] Electrical Stimulation  [x] Gait Training      [] Cervical Traction [] Lumbar Traction  [x] Neuromuscular Re-education    [] Cold/hotpack [] Iontophoresis   [x] Instruction in HEP     Other:  [x] Manual Therapy      []             [] Aquatic Therapy      []           ? Goals:  Short term goals  Time Frame for Short term goals: 3 weeks  Short term goal 1: Initiate HEP    Long term goals  Time Frame for Long term goals : 6 weeks  Long term goal 1: Indpendent in HEP  Long term goal 2: Improve LE strength to 5/5 to ease gait and transfers  Long term goal 3: Patient to note 50% reduction in radicular symptoms. Long term goal 4: Patient to be able to sit up to 45 min without increase in LE pain. Frequency/Duration:21-3/1/21  # Days per week: [] 1 day # Weeks: [] 1 week [] 5 weeks     [x] 2 days?    [] 2 weeks [x] 6 weeks     [x] 3 days   [] 3 weeks [] 7 weeks     [] 4 days   [] 4 weeks [] 8 weeks    Rehab Potential: [] Excellent [] Good [] Fair  [] Poor Electronically signed by:  Jerald Hobson PT    If you have any questions or concerns, please don't hesitate to call.   Thank you for your referral.      Physician Signature:________________________________Date:__________________  By signing above, therapists plan is approved by physician

## 2021-01-19 NOTE — FLOWSHEET NOTE
[] Reviewed/Progressed HEP activities related to improving balance, coordination, kinesthetic sense, posture, motor skill, proprioception.  (01653)    Manual Treatments:    [] Provided manual therapy to mobilize soft tissue/joints for the purpose of modulating pain, promoting relaxation,  increasing ROM, reducing/eliminating soft tissue swelling/inflammation/restriction, improving soft tissue extensibility. (95118)    Service Based Modalities:      Timed Code Treatment Minutes:   6' man     Total Treatment Minutes:   35    Treatment/Activity Tolerance:  [x] Patient tolerated treatment well [] Patient limited by fatique  [] Patient limited by pain  [] Patient limited by other medical complications  [] Other:     Prognosis: [x] Good [] Fair  [] Poor    Patient Requires Follow-up: [x] Yes  [] No      Goals:  Short term goals  Time Frame for Short term goals: 3 weeks  Short term goal 1: Initiate HEP    Long term goals  Time Frame for Long term goals : 6 weeks  Long term goal 1: Indpendent in HEP  Long term goal 2: Improve LE strength to 5/5 to ease gait and transfers  Long term goal 3: Patient to note 50% reduction in radicular symptoms. Long term goal 4: Patient to be able to sit up to 45 min without increase in LE pain.           Plan:   [] Continue per plan of care [] Alter current plan (see comments)  [x] Plan of care initiated [] Hold pending MD visit [] Discharge  Plan for Next Session:      Electronically signed by:  Radha Romero

## 2021-01-22 ENCOUNTER — VIRTUAL VISIT (OUTPATIENT)
Dept: FAMILY MEDICINE CLINIC | Age: 66
End: 2021-01-22
Payer: MEDICARE

## 2021-01-22 ENCOUNTER — HOSPITAL ENCOUNTER (OUTPATIENT)
Dept: PHYSICAL THERAPY | Age: 66
Setting detail: THERAPIES SERIES
Discharge: HOME | End: 2021-01-22
Payer: MEDICARE

## 2021-01-22 VITALS — HEIGHT: 64 IN | WEIGHT: 148 LBS | BODY MASS INDEX: 25.27 KG/M2

## 2021-01-22 DIAGNOSIS — Z00.00 ROUTINE GENERAL MEDICAL EXAMINATION AT A HEALTH CARE FACILITY: Primary | ICD-10-CM

## 2021-01-22 PROCEDURE — 4040F PNEUMOC VAC/ADMIN/RCVD: CPT | Performed by: FAMILY MEDICINE

## 2021-01-22 PROCEDURE — 1123F ACP DISCUSS/DSCN MKR DOCD: CPT | Performed by: FAMILY MEDICINE

## 2021-01-22 PROCEDURE — 99211 OFF/OP EST MAY X REQ PHY/QHP: CPT

## 2021-01-22 PROCEDURE — 3017F COLORECTAL CA SCREEN DOC REV: CPT | Performed by: FAMILY MEDICINE

## 2021-01-22 PROCEDURE — 97140 MANUAL THERAPY 1/> REGIONS: CPT

## 2021-01-22 PROCEDURE — G8484 FLU IMMUNIZE NO ADMIN: HCPCS | Performed by: FAMILY MEDICINE

## 2021-01-22 PROCEDURE — 97110 THERAPEUTIC EXERCISES: CPT

## 2021-01-22 PROCEDURE — G0439 PPPS, SUBSEQ VISIT: HCPCS | Performed by: FAMILY MEDICINE

## 2021-01-22 ASSESSMENT — LIFESTYLE VARIABLES
HOW OFTEN DURING THE LAST YEAR HAVE YOU BEEN UNABLE TO REMEMBER WHAT HAPPENED THE NIGHT BEFORE BECAUSE YOU HAD BEEN DRINKING: 0
HOW OFTEN DURING THE LAST YEAR HAVE YOU NEEDED AN ALCOHOLIC DRINK FIRST THING IN THE MORNING TO GET YOURSELF GOING AFTER A NIGHT OF HEAVY DRINKING: 0
HOW OFTEN DURING THE LAST YEAR HAVE YOU HAD A FEELING OF GUILT OR REMORSE AFTER DRINKING: 0
HOW OFTEN DO YOU HAVE SIX OR MORE DRINKS ON ONE OCCASION: 0
HAS A RELATIVE, FRIEND, DOCTOR, OR ANOTHER HEALTH PROFESSIONAL EXPRESSED CONCERN ABOUT YOUR DRINKING OR SUGGESTED YOU CUT DOWN: 0
HAVE YOU OR SOMEONE ELSE BEEN INJURED AS A RESULT OF YOUR DRINKING: 0
AUDIT-C TOTAL SCORE: 4
HOW OFTEN DO YOU HAVE A DRINK CONTAINING ALCOHOL: 4
AUDIT TOTAL SCORE: 4
HOW OFTEN DURING THE LAST YEAR HAVE YOU FOUND THAT YOU WERE NOT ABLE TO STOP DRINKING ONCE YOU HAD STARTED: 0

## 2021-01-22 ASSESSMENT — PATIENT HEALTH QUESTIONNAIRE - PHQ9
2. FEELING DOWN, DEPRESSED OR HOPELESS: 0
SUM OF ALL RESPONSES TO PHQ QUESTIONS 1-9: 0
SUM OF ALL RESPONSES TO PHQ9 QUESTIONS 1 & 2: 0
SUM OF ALL RESPONSES TO PHQ QUESTIONS 1-9: 0

## 2021-01-22 NOTE — PROGRESS NOTES
Medicare Annual Wellness Visit  Name: Tatyana Gomez Date: 2021   MRN: [id number] Sex: Female   Age: 77 y.o. Ethnicity: Non-/Non    : 1955 Race: Alfredo Singher is here for Medicare AWV (subsequent; last 1/10/2020)    Screenings for behavioral, psychosocial and functional/safety risks, and cognitive dysfunction are all negative except as indicated below. These results, as well as other patient data from the [address] form, are documented in Flowsheets linked to this Encounter. No Known Allergies    Prior to Visit Medications    Medication Sig Taking? Authorizing Provider   levothyroxine (SYNTHROID) 112 MCG tablet Take 1 tablet by mouth daily Yes Ting Meza DO   clopidogrel (PLAVIX) 75 MG tablet take 1 tablet by mouth once daily Yes Ting Meza DO   atorvastatin (LIPITOR) 40 MG tablet take 1 tablet by mouth at bedtime Yes Ting Meza DO   Probiotic Product (PROBIOTIC DAILY PO) Take by mouth Yes Historical Provider, MD   aspirin 81 MG tablet Take 81 mg by mouth daily Yes Historical Provider, MD       Past Medical History:   Diagnosis Date    Cerebrovascular accident (CVA) due to occlusion of left vertebral artery ([address] 2017    Left medulary stroke.   residual effects:  mild ataxia, left sided paresthesias    Corneal neovascularization     Elevated fasting blood sugar     Hyperlipidemia     Hypothyroidism     IFG (impaired fasting glucose)     Knee pain     Myopia with astigmatism     Neck pain     intermittent    Osteoarthritis        Past Surgical History:   Procedure Laterality Date    COLONOSCOPY  2008    no acute abnormality    INSERTABLE CARDIAC MONITOR  2017    LINQ cardiac monitor    MI COLONOSCOPY FLX DX W/COLLJ SPEC WHEN PFRMD N/A 2018    COLONOSCOPY performed by Tabitha Irving MD at [address]         Family History   Problem Relation Age of Onset  Cancer Father         metastatic melanoma    Thyroid Disease Mother     Heart Disease Maternal Grandmother     Stroke Maternal Grandmother     Heart Disease Maternal Grandfather     Stroke Maternal Grandfather     Heart Disease Paternal Grandmother     Stroke Paternal Grandmother     Heart Disease Paternal Grandfather     Stroke Paternal Grandfather        CareTeam (Including outside providers/suppliers regularly involved in providing care):   Patient Care Team:  Joi Jones, DO as PCP - General (Family Medicine)  Joi Jones, DO as PCP - [facility] Empaneled Provider    Wt Readings from Last 3 Encounters:   01/22/21 148 lb (67.1 kg)   01/14/21 148 lb (67.1 kg)   07/14/20 152 lb (68.9 kg)     Vitals:    01/22/21 0956   Weight: 148 lb (67.1 kg)   Height: 5' 4\" (1.626 m)     Body mass index is 25.4 kg/m². Based upon direct observation of the patient, evaluation of cognition reveals recent and remote memory intact. Patient's complete Health Risk Assessment and screening values have been reviewed and are found in Flowsheets. The following problems were reviewed today and where indicated follow up appointments were made and/or referrals ordered. Positive Risk Factor Screenings with Interventions:          General Health and ACP:  General  In general, how would you say your health is?: Very Good  In the past 7 days, have you experienced any of the following?  New or Increased Pain, New or Increased Fatigue, Loneliness, Social Isolation, Stress or Anger?: None of These  Do you get the social and emotional support that you need?: Yes  Do you have a Living Will?: (!) No  Advance Directives     Power of [facility] Will ACP-Advance Directive ACP-Power of     Not on File Not on File Not on File Not on File      General Health Risk Interventions:  · No Living Will: Advance Care Planning addressed with patient today    Health Habits/Nutrition:  Health Habits/Nutrition Do you exercise for at least 20 minutes 2-3 times per week?: Yes  Have you lost any weight without trying in the past 3 months?: No  Do you eat fewer than 2 meals per day?: No  Have you seen a dentist within the past year?: (!) No  Body mass index: (!) 25.4  Health Habits/Nutrition Interventions:  · Nutritional issues:  educational materials for healthy, well-balanced diet provided  · Dental exam overdue:  patient encouraged to make appointment with his/her dentist       Personalized Preventive Plan   Current Health Maintenance Status  Immunization History   Administered Date(s) Administered    Influenza, High Dose (Fluzone 65 yrs and older) 12/10/2018    Influenza, Quadv, IM, PF (6 mo and older Fluzone, Flulaval, Fluarix, and 3 yrs and older Afluria) 12/07/2017    Influenza, Quadv, adjuvanted, 65 yrs +, IM, PF (Fluad) 10/23/2020    Pneumococcal Conjugate 13-valent ([id number]) 12/10/2018    Pneumococcal Polysaccharide ([id number]) 01/10/2020    Tdap (Boostrix, Adacel) 06/09/2017        Health Maintenance   Topic Date Due    Shingles Vaccine (1 of 2) Script given 1/14/2021    DEXA (modify frequency per FRAX score)  Is scheduled 1/26/2021    Annual Wellness Visit (AWV)  Completed today    A1C test (Diabetic or Prediabetic)  01/14/2022    Lipid screen  01/14/2022    TSH testing  01/14/2022    Breast cancer screen  06/26/2022    DTaP/Tdap/Td vaccine (2 - Td) 06/09/2027    Colon cancer screen colonoscopy  07/11/2028    Flu vaccine  Completed    Pneumococcal 65+ years Vaccine  Completed    Hepatitis C screen  Completed    Hepatitis A vaccine  Aged Out    Hepatitis B vaccine  Aged Out    Hib vaccine  Aged Out    Meningococcal (ACWY) vaccine  Aged Out     Recommendations for [employer] Due: see orders and patient instructions/AVS.  .   Recommended screening schedule for the next 5-10 years is provided to the patient in written form: see Patient Instructions/AVS. Wild Mckeon RN, 1/22/2021, performed the documented evaluation under the direct supervision of the attending physician. Antonio Alva is a 77 y.o. female being evaluated by a Virtual Visit (telephone visit) encounter to address concerns as mentioned above. A caregiver was present when appropriate. Due to this being a TeleHealth encounter (During GQMXL-89 public health emergency), evaluation of the following organ systems was limited: Vitals/Constitutional/EENT/Resp/CV/GI//MS/Neuro/Skin/Heme-Lymph-Imm. Pursuant to the emergency declaration under the [address] [place] [address], [address] [place] [address] authority and the [employer] and [employer] Act, this Virtual Visit was conducted with patient's (and/or legal guardian's) consent, to reduce the patient's risk of exposure to COVID-19 and provide necessary medical care. The patient (and/or legal guardian) has also been advised to contact this office for worsening conditions or problems, and seek emergency medical treatment and/or call 911 if deemed necessary. Patient identification was verified at the start of the visit: Yes    Total time spent for this encounter: Not billed by time    Services were provided through a telephone synchronous discussion virtually to substitute for in-person clinic visit. Patient and provider were located at their individual homes. --Niki Sun RN on 1/22/2021 at 10:01 AM    An electronic signature was used to authenticate this note.

## 2021-01-22 NOTE — FLOWSHEET NOTE
Physical Therapy Daily Treatment Note    Date:  2021    Patient Name:  Niya Turner    :  1955  MRN: [id number]  Restrictions/Precautions:     Medical/Treatment Diagnosis Information:   · Diagnosis: M79.18 (ICD-10-CM) - Gluteal pain     Insurance/Certification information:  PT Insurance Information: Medicare/ [employer]  Physician Information:  Referring Practitioner: Sandie Angelo DO  Plan of care signed (Y/N):  n  Visit# / total visits:  2/10  Pain level: 7/10       Time In: 9498  Time Out:1300  Progress Note: [x]  Yes  []  No  Next due by: Visit #10      Subjective:   Patient reproting feeling better yesterday, today not as bad. Objective: SANDY per flowsheet to decrease pain and inflammation of the Left sciatic. Completed prone based there-ex with no noted increase in pain. Followed by IDN to decrease pain and muscle tightness. ? Observation:   ? Test measurements:      Exercises:   Exercise/Equipment Resistance/Repetitions Other comments        Prone prop 5'    Prone pressup  10x    PKB  10x x 2     Prone hip ext 10x x 2          Abd Braceing  5\" x 10     PPT  5\" x 10     LTR  5\" x 10     Bridging  10x     Modified trunk ext      Standing Trunk ext          IDN  8    [] Provided verbal/tactile cueing for activities related to strengthening, flexibility, endurance, ROM. (77883)  [] Provided verbal/tactile cueing for activities related to improving balance, coordination, kinesthetic sense, posture, motor skill, proprioception. (60584)    Therapeutic Activities:     [] Therapeutic activities, direct (one-on-one) patient contact (use of dynamic activities to improve functional performance). (85847)    Gait:   [] Provided training and instruction to the patient for ambulation re-education.  (38223)    Self-Care/ADL's

## 2021-01-22 NOTE — PATIENT INSTRUCTIONS
Personalized Preventive Plan for Toby Self - 1/22/2021  Medicare offers a range of preventive health benefits. Some of the tests and screenings are paid in full while other may be subject to a deductible, co-insurance, and/or copay. Some of these benefits include a comprehensive review of your medical history including lifestyle, illnesses that may run in your family, and various assessments and screenings as appropriate. After reviewing your medical record and screening and assessments performed today your provider may have ordered immunizations, labs, imaging, and/or referrals for you. A list of these orders (if applicable) as well as your Preventive Care list are included within your After Visit Summary for your review. Other Preventive Recommendations:    · A preventive eye exam performed by an eye specialist is recommended every 1-2 years to screen for glaucoma; cataracts, macular degeneration, and other eye disorders. · A preventive dental visit is recommended every 6 months. · Try to get at least 150 minutes of exercise per week or 10,000 steps per day on a pedometer . · Order or download the FREE \"Exercise & Physical Activity: Your Everyday Guide\" from [employer]. Call 7-904.257.9386 or search [employer] online. · You need 2504-5461 mg of calcium and 5873-8410 IU of vitamin D per day. It is possible to meet your calcium requirement with diet alone, but a vitamin D supplement is usually necessary to meet this goal.  · When exposed to the sun, use a sunscreen that protects against both UVA and UVB radiation with an SPF of 30 or greater. Reapply every 2 to 3 hours or after sweating, drying off with a towel, or swimming. · Always wear a seat belt when traveling in a car. Always wear a helmet when riding a bicycle or motorcycle. Heart-Healthy Diet   Sodium, Fat, and Cholesterol Controlled Diet       What Is a Heart Healthy Diet? A heart-healthy diet is one that limits sodium , certain types of fat , and cholesterol . This type of diet is recommended for:   People with any form of cardiovascular disease (eg, coronary heart disease , peripheral vascular disease , previous heart attack , previous stroke )   People with risk factors for cardiovascular disease, such as high blood pressure , high cholesterol , or diabetes   Anyone who wants to lower their risk of developing cardiovascular disease   Sodium    Sodium is a mineral found in many foods. In general, most people consume much more sodium than they need. Diets high in sodium can increase blood pressure and lead to edema (water retention). On a heart-healthy diet, you should consume no more than 2,300 mg (milligrams) of sodium per dayabout the amount in one teaspoon of table salt. The foods highest in sodium include table salt (about 50% sodium), processed foods, convenience foods, and preserved foods. Cholesterol    Cholesterol is a fat-like, waxy substance in your blood. Our bodies make some cholesterol. It is also found in animal products, with the highest amounts in fatty meat, egg yolks, whole milk, cheese, shellfish, and organ meats. On a heart-healthy diet, you should limit your cholesterol intake to less than 200 mg per day. It is normal and important to have some cholesterol in your bloodstream. But too much cholesterol can cause plaque to build up within your arteries, which can eventually lead to a heart attack or stroke. The two types of cholesterol that are most commonly referred to are:   Low-density lipoprotein (LDL) cholesterol  Also known as bad cholesterol, this is the cholesterol that tends to build up along your arteries. Bad cholesterol levels are increased by eating fats that are saturated or hydrogenated. Optimal level of this cholesterol is less than 100. Over 130 starts to get risky for heart disease. High-density lipoprotein (HDL) cholesterol  Also known as good cholesterol, this type of cholesterol actually carries cholesterol away from your arteries and may, therefore, help lower your risk of having a heart attack. You want this level to be high (ideally greater than 60). It is a risk to have a level less than 40. You can raise this good cholesterol by eating olive oil, canola oil, avocados, or nuts. Exercise raises this level, too. Fat    Fat is calorie dense and packs a lot of calories into a small amount of food. Even though fats should be limited due to their high calorie content, not all fats are bad. In fact, some fats are quite healthful. Fat can be broken down into four main types.    The good-for-you fats are:   Monounsaturated fat  found in oils such as olive and canola, avocados, and nuts and natural nut butters; can decrease cholesterol levels, while keeping levels of HDL cholesterol high   Polyunsaturated fat  found in oils such as safflower, sunflower, soybean, corn, and sesame; can decrease total cholesterol and LDL cholesterol   Omega-3 fatty acids  particularly those found in fatty fish (such as salmon, trout, tuna, mackerel, herring, and sardines); can decrease risk of arrhythmias, decrease triglyceride levels, and slightly lower blood pressure   The fats that you want to limit are:   Saturated fat  found in animal products, many fast foods, and a few vegetables; increases total blood cholesterol, including LDL levels   Animal fats that are saturated include: butter, lard, whole-milk dairy products, meat fat, and poultry skin   Vegetable fats that are saturated include: hydrogenated shortening, palm oil, coconut oil, cocoa butter   Hydrogenated or trans fat  found in margarine and vegetable shortening, most shelf stable snack foods, and fried foods; increases LDL and decreases HDL It is generally recommended that you limit your total fat for the day to less than 30% of your total calories. If you follow an 1800-calorie heart healthy diet, for example, this would mean 60 grams of fat or less per day. Saturated fat and trans fat in your diet raises your blood cholesterol the most, much more than dietary cholesterol does. For this reason, on a heart-healthy diet, less than 7% of your calories should come from saturated fat and ideally 0% from trans fat. On an 1800-calorie diet, this translates into less than 14 grams of saturated fat per day, leaving 46 grams of fat to come from mono- and polyunsaturated fats.    Food Choices on a Heart Healthy Diet   Food Category   Foods Recommended   Foods to Avoid   Grains   Breads and rolls without salted tops Most dry and cooked cereals Unsalted crackers and breadsticks Low-sodium or homemade breadcrumbs or stuffing All rice and pastas   Breads, rolls, and crackers with salted tops High-fat baked goods (eg, muffins, donuts, pastries) Quick breads, self-rising flour, and biscuit mixes Regular bread crumbs Instant hot cereals Commercially prepared rice, pasta, or stuffing mixes   Vegetables   Most fresh, frozen, and low-sodium canned vegetables Low-sodium and salt-free vegetable juices Canned vegetables if unsalted or rinsed   Regular canned vegetables and juices, including sauerkraut and pickled vegetables Frozen vegetables with sauces Commercially prepared potato and vegetable mixes   Fruits   Most fresh, frozen, and canned fruits All fruit juices   Fruits processed with salt or sodium   Milk   Nonfat or low-fat (1%) milk Nonfat or low-fat yogurt Cottage cheese, low-fat ricotta, cheeses labeled as low-fat and low-sodium   Whole milk Reduced-fat (2%) milk Malted and chocolate milk Full fat yogurt Most cheeses (unless low-fat and low salt) Buttermilk (no more than 1 cup per week)   Meats and Beans Lean cuts of fresh or frozen beef, veal, lamb, or pork (look for the word loin) Fresh or frozen poultry without the skin Fresh or frozen fish and some shellfish Egg whites and egg substitutes (Limit whole eggs to three per week) Tofu Nuts or seeds (unsalted, dry-roasted), low-sodium peanut butter Dried peas, beans, and lentils   Any smoked, cured, salted, or canned meat, fish, or poultry (including jones, chipped beef, cold cuts, hot dogs, sausages, sardines, and anchovies) Poultry skins Breaded and/or fried fish or meats Canned peas, beans, and lentils Salted nuts   Fats and Oils   Olive oil and canola oil Low-sodium, low-fat salad dressings and mayonnaise   Butter, margarine, coconut and palm oils, jones fat   Snacks, Sweets, and Condiments   Low-sodium or unsalted versions of broths, soups, soy sauce, and condiments Pepper, herbs, and spices; vinegar, lemon, or lime juice Low-fat frozen desserts (yogurt, sherbet, fruit bars) Sugar, cocoa powder, honey, syrup, jam, and preserves Low-fat, trans-fat free cookies, cakes, and pies Gian and animal crackers, fig bars, belén snaps   High-fat desserts Broth, soups, gravies, and sauces, made from instant mixes or other high-sodium ingredients Salted snack foods Canned olives Meat tenderizers, seasoning salt, and most flavored vinegars   Beverages   Low-sodium carbonated beverages Tea and coffee in moderation Soy milk   Commercially softened water   Suggestions   Make whole grains, fruits, and vegetables the base of your diet. Choose heart-healthy fats such as canola, olive, and flaxseed oil, and foods high in heart-healthy fats, such as nuts, seeds, soybeans, tofu, and fish. Eat fish at least twice per week; the fish highest in omega-3 fatty acids and lowest in mercury include salmon, herring, mackerel, sardines, and canned chunk light tuna. If you eat fish less than twice per week or have high triglycerides, talk to your doctor about taking fish oil supplements. Read food labels. For products low in fat and cholesterol, look for fat free, low-fat, cholesterol free, saturated fat free, and trans fat freeAlso scan the Nutrition Facts Label, which lists saturated fat, trans fat, and cholesterol amounts. For products low in sodium, look for sodium free, very low sodium, low sodium, no added salt, and unsalted   Skip the salt when cooking or at the table; if food needs more flavor, get creative and try out different herbs and spices. Garlic and onion also add substantial flavor to foods. Trim any visible fat off meat and poultry before cooking, and drain the fat off after davis. Use cooking methods that require little or no added fat, such as grilling, boiling, baking, poaching, broiling, roasting, steaming, stir-frying, and sauting. Avoid fast food and convenience food. They tend to be high in saturated and trans fat and have a lot of added salt. Talk to a registered dietitian for individualized diet advice. Last Reviewed: March 2011 Aiden Neumann MS, MPH, RD   Updated: 3/29/2011   ·     Keep Your Memory Kurtis Pretzel       Many factors can affect your ability to remembera hectic lifestyle, aging, stress, chronic disease, and certain medicines. But, there are steps you can take to sharpen your mind and help preserve your memory. Challenge Your Brain   Regularly challenging your mind may help keeps it in top shape. Good mental exercises include:   Crossword puzzlesUse a dictionary if you need it; you will learn more that way. Brainteasers Try some!    Crafts, such as wood working and [facility], such as gardening and building model airplanes SocializingVisit old friends or join groups to meet new ones. Reading   Learning a new language   Taking a class, whether it be art history or tatiana chi   TravelingExperience the food, history, and culture of your destination   Learning to use a computer   Going to museums, the theater, or thought-provoking movies   Changing things in your daily life, such as reversing your pattern in the grocery store or brushing your teeth using your nondominant hand   Use Memory Aids   There is no need to remember every detail on your own. These memory aids can help:   Calendars and day planners   Electronic organizers to store all sorts of helpful informationThese devices can \"beep\" to remind you of appointments. A book of days to record birthdays, anniversaries, and other occasions that occur on the same date every year   Detailed \"to-do\" lists and strategically placed sticky notes   Quick \"study\" sessionsBefore a gathering, review who will be there so their names will be fresh in your mind. Establish routinesFor example, keep your keys, wallet, and umbrella in the same place all the time or take medicine with your 8:00 AM glass of juice   Live a Healthy Life   Many actions that will keep your body strong will do the same for your mind. For example:   Talk to Your Doctor About Herbs and Supplements    Malnutrition and vitamin deficiencies can impair your mental function. For example, vitamin B12 deficiency can cause a range of symptoms, including confusion. But, what if your nutritional needs are being met? Can herbs and supplements still offer a benefit? Researchers have investigated a range of natural remedies, such as ginkgo , ginseng , and the supplement phosphatidylserine (PS). So far, though, the evidence is inconsistent as to whether these products can improve memory or thinking. If you are interested in taking herbs and supplements, talk to your doctor first because they may interact with other medicines that you are taking. Exercise Regularly    Among the many benefits of regular exercise are increased blood flow to the brain and decreased risk of certain diseases that can interfere with memory function. One study found that even moderate exercise has a beneficial effect. Examples of \"moderate\" exercise include:   Playing 18 holes of golf once a week, without a cart   Playing tennis twice a week   Walking one mile per day   Manage Stress    It can be tough to remember what is important when your mind is cluttered. Make time for relaxation. Choose activities that calm you down, and make it routine. Manage Chronic Conditions    Side effects of high blood pressure , diabetes, and heart disease can interfere with mental function. Many of the lifestyle steps discussed here can help manage these conditions. Strive to eat a healthy diet, exercise regularly, get stress under control, and follow your doctor's advice for your condition. Minimize Medications    Talk to your doctor about the medicines that you take. Some may be unnecessary. Also, healthy lifestyle habits may lower the need for certain drugs. Last Reviewed: April 2010 Alison Summers MD   Updated: 4/13/2010   ·     [address]       As we get older, changes in balance, gait, strength, vision, hearing, and cognition make even the most youthful senior more prone to accidents. Falls are one of the leading health risks for older people.  This increased risk of falling is related to:   Aging process (eg, decreased muscle strength, slowed reflexes)   Higher incidence of chronic health problems (eg, arthritis, diabetes) that may limit mobility, agility or sensory awareness Side effects of medicine (eg, dizziness, blurred vision)especially medicines like prescription pain medicines and drugs used to treat mental health conditions   Depending on the brittleness of your bones, the consequences of a fall can be serious and long lasting. Home Life   Research by the Association of Aging [facility]) shows that some home accidents among older adults can be prevented by making simple lifestyle changes and basic modifications and repairs to the home environment. Here are some lifestyle changes that experts recommend:   Have your hearing and vision checked regularly. Be sure to wear prescription glasses that are right for you. Speak to your doctor or pharmacist about the possible side effects of your medicines. A number of medicines can cause dizziness. If you have problems with sleep, talk to your doctor. Limit your intake of alcohol. If necessary, use a cane or walker to help maintain your balance. Wear supportive, rubber-soled shoes, even at home. If you live in a region that gets wintry weather, you may want to put special cleats on your shoes to prevent you from slipping on the snow and ice. Exercise regularly to help maintain muscle tone, agility, and balance. Always hold the banister when going up or down stairs. Also, use  bars when getting in or out of the bath or shower, or using the toilet. To avoid dizziness, get up slowly from a lying down position. Sit up first, dangling your legs for a minute or two before rising to a standing position. Overall Home Safety Check   According to the Consumer Product Safety Commision's \"Older Consumer Home Safety Checklist,\" it is important to check for potential hazards in each room. And remember, proper lighting is an essential factor in home safety. If you cannot see clearly, you are more likely to fall.    Important questions to ask yourself include: Are lamp, electric, extension, and telephone cords placed out of the flow of traffic and maintained in good condition? Have frayed cords been replaced? Are all small rugs and runners slip resistant? If not, you can secure them to the floor with a special double-sided carpet tape. Are smoke detectors properly locatedone on every floor of your home and one outside of every sleeping area? Are they in good working order? Are batteries replaced at least once a year? Do you have a well-maintained carbon monoxide detector outside every sleeping are in your home? Does your furniture layout leave plenty of space to maneuver between and around chairs, tables, beds, and sofas? Are hallways, stairs and passages between rooms well lit? Can you reach a lamp without getting out of bed? Are floor surfaces well maintained? Shag rugs, high-pile carpeting, tile floors, and polished wood floors can be particularly slippery. Stairs should always have handrails and be carpeted or fitted with a non-skid tread. Is your telephone easily reachable. Is the cord safely tucked away? Room by Room   According to the Association of Aging, bathrooms and royer are the two most potentially hazardous rooms in your home. In the Kitchen    Be sure your stove is in proper working order and always make sure burners and the oven are off before you go out or go to sleep. Keep pots on the back burners, turn handles away from the front of the stove, and keep stove clean and free of grease build-up. Kitchen ventilation systems and range exhausts should be working properly. Keep flammable objects such as towels and pot holders away from the cooking area except when in use. Make sure kitchen curtains are tied back. Move cords and appliances away from the sink and hot surfaces. If extension cords are needed, install wiring guides so they do not hang over the sink, range, or working areas. Look for coffee pots, kettles and toaster ovens with automatic shut-offs. Keep a mop handy in the kitchen so you can wipe up spills instantly. You should also have a small fire extinguisher. Arrange your kitchen with frequently used items on lower shelves to avoid the need to stand on a stepstool to reach them. Make sure countertops are well-lit to avoid injuries while cutting and preparing food. In the Bathroom    Use a non-slip mat or decals in the tub and shower, since wet, soapy tile or porcelain surfaces are extremely slippery. Make sure bathroom rugs are non-skid or tape them firmly to the floor. Bathtubs should have at least one, preferably two, grab bars, firmly attached to structural supports in the wall. (Do not use built-in soap holders or glass shower doors as grab bars.)    Tub seats fitted with non-slip material on the legs allow you to wash sitting down. For people with limited mobility, bathtub transfer benches allow you to slide safely into the tub. Raised toilet seats and toilet safety rails are helpful for those with knee or hip problems. In the [facility]    Make sure you use a nightlight and that the area around your bed is clear of potential obstacles. Be careful with electric blankets and never go to sleep with a heating pad, which can cause serious burns even if on a low setting. Use fire-resistant mattress covers and pillows, and NEVER smoke in bed. Keep a phone next to the bed that is programmed to dial 911 at the push of a button. If you have a chronic condition, you may want to sign on with an automatic call-in service. Typically the system includes a small pendant that connects directly to an emergency medical voice-response system. You should also make arrangements to stay in contact with someonefriend, neighbor, family memberon a regular schedule.    Fire Prevention According to the National S.A.F.E. (Smoke Alarms for Every) [address] [facility], senior citizens are one of the two highest risk groups for death and serious injuries due to residential fires. When cooking, wear short-sleeved items, never a bulky long-sleeved robe. The [facility]'s Safety Checklist for Older Consumers emphasizes the importance of checking basements, garages, workshops and storage areas for fire hazards, such as volatile liquids, piles of old rags or clothing and overloaded circuits. Never smoke in bed or when lying down on a couch or recliner chair. Small portable electric or kerosene heaters are responsible for many home fires and should be used cautiously if at all. If you do use one, be sure to keep them away from flammable materials. In case of fire, make sure you have a pre-established emergency exit plan. Have a professional check your fireplace and other fuel-burning appliances yearly. [employer]   Baby boomers entering the fisher years will continue to see the development of new products to help older adults live safely and independently in spite of age-related changes. Making Life More Livable  , by Axel Johnson, lists over 1,000 products for \"living well in the mature years,\" such as bathing and mobility aids, household security devices, ergonomically designed knives and peelers, and faucet valves and knobs for temperature control. Medical supply stores and organizations are good sources of information about products that improve your quality of life and insure your safety. Last Reviewed: November 2009 Ruth Gonzales MD   Updated: 3/7/2011     ·        Learning About Living Ksenia Perales  What is a living will? A living will, also called a declaration, is a legal form. It tells your family and your doctor your wishes when you can't speak for yourself. It's used by the health professionals who will treat you as you near the end of your life or if you get seriously hurt or ill. If you put your wishes in writing, your loved ones and others will know what kind of care you want. They won't need to guess. This can ease your mind and be helpful to others. And you can change or cancel your living will at any time. A living will is not the same as an estate or property will. An estate will explains what you want to happen with your money and property after you die. How do you use it? A living will is used to describe the kinds of treatment or life support you want as you near the end of your life or if you get seriously hurt or ill. Keep these facts in mind about living sanders. Your living will is used only if you can't speak or make decisions for yourself. Most often, one or more doctors must certify that you can't speak or decide for yourself before your living will takes effect. If you get better and can speak for yourself again, you can accept or refuse any treatment. It doesn't matter what you said in your living will. Some states may limit your right to refuse treatment in certain cases. For example, you may need to clearly state in your living will that you don't want artificial hydration and nutrition, such as being fed through a tube. Is a living will a legal document? A living will is a legal document. Each state has its own laws about living sanders. And a living will may be called something else in your state. Here are some things to know about living sanders. You don't need an  to complete a living will. But legal advice can be helpful if your state's laws are unclear. It can also help if your health history is complicated or your family can't agree on what should be in your living will. You can change your living will at any time. Some people find that their wishes about end-of-life care change as their health changes. If you make big changes to your living will, complete a new form. If you move to another state, make sure that your living will is legal in the state where you now live. In most cases, doctors will respect your wishes even if you have a form from a different state. You might use a universal form that has been approved by many states. This kind of form can sometimes be filled out and stored online. Your digital copy will then be available wherever you have a connection to the internet. The doctors and nurses who need to treat you can find it right away. Your state may offer an online registry. This is another place where you can store your living will online. It's a good idea to get your living will notarized. This means using a person called a Ekotrope to watch two people sign, or witness, your living will. What should you know when you create a living will? Here are some questions to ask yourself as you make your living will:  Do you know enough about life support methods that might be used? If not, talk to your doctor so you know what might be done if you can't breathe on your own, your heart stops, or you can't swallow. What things would you still want to be able to do after you receive life-support methods? Would you want to be able to walk? To speak? To eat on your own? To live without the help of machines? Do you want certain Gnosticist practices performed if you become very ill? If you have a choice, where do you want to be cared for? In your home? At a hospital or nursing home? If you have a choice at the end of your life, where would you prefer to die? At home? In a hospital or nursing home? Somewhere else? Would you prefer to be buried or cremated? Do you want your organs to be donated after you die? What should you do with your living will? Make sure that your family members and your health care agent have copies of your living will (also called a declaration). Give your doctor a copy of your living will. Ask him or her to keep it as part of your medical record. If you have more than one doctor, make sure that each one has a copy. Put a copy of your living will where it can be easily found. For example, some people may put a copy on their refrigerator door. If you are using a digital copy, be sure your doctor, family members, and health care agent know how to find and access it. Where can you learn more? Go to [email or web address] and sign in to your [employer] account. Enter J004 in the "[employer] box to learn more about \"Learning About Living [place]. \"     If you do not have an account, please click on the \"Sign Up Now\" link. Current as of: December 9, 2019               Content Version: 12.6  © 6959-4322 [employer]. Care instructions adapted under license by [facility] ([facility]). If you have questions about a medical condition or this instruction, always ask your healthcare professional. [place] [address] any warranty or liability for your use of this information.     ·

## 2021-01-26 ENCOUNTER — HOSPITAL ENCOUNTER (OUTPATIENT)
Dept: NON INVASIVE DIAGNOSTICS | Age: 66
Discharge: HOME | End: 2021-01-26
Payer: MEDICARE

## 2021-01-26 ENCOUNTER — HOSPITAL ENCOUNTER (OUTPATIENT)
Dept: BONE DENSITY | Age: 66
Discharge: HOME | End: 2021-01-28
Payer: MEDICARE

## 2021-01-26 ENCOUNTER — TELEPHONE (OUTPATIENT)
Dept: FAMILY MEDICINE CLINIC | Age: 66
End: 2021-01-26

## 2021-01-26 DIAGNOSIS — I35.1 MODERATE AORTIC REGURGITATION: ICD-10-CM

## 2021-01-26 DIAGNOSIS — Z78.0 MENOPAUSE: ICD-10-CM

## 2021-01-26 DIAGNOSIS — R93.1 ABNORMAL ECHOCARDIOGRAM: Primary | ICD-10-CM

## 2021-01-26 LAB
LV EF: 55 %
LVEF MODALITY: NORMAL

## 2021-01-26 PROCEDURE — 93306 TTE W/DOPPLER COMPLETE: CPT

## 2021-01-26 PROCEDURE — 77085 DXA BONE DENSITY AXL VRT FX: CPT

## 2021-01-26 NOTE — TELEPHONE ENCOUNTER
Patient notified of echo results and recommendations. Would like to just see someone here at [address] [facility] as it has been several years since she saw her previous cardiologist. Cardio referral placed and patient transferred to schedule.

## 2021-01-26 NOTE — TELEPHONE ENCOUNTER
----- Message from Meaghan Rivera DO sent at 1/26/2021  1:55 PM EST -----  Has had increase of degree aortic insufficiency, mitral regurgitation and tricuspid regurgitation in the last year compared to last check. Ejection fraction is also a little lower than last check. Would follow up with cardiology to discuss changes. She had seen cardiology I believe at [address] [facility], but they did not repeat her ECHO. Not sure if she would like to continue to see that cardiologist or would prefer to see one here.

## 2021-01-27 ENCOUNTER — APPOINTMENT (OUTPATIENT)
Dept: PHYSICAL THERAPY | Age: 66
End: 2021-01-27
Payer: MEDICARE

## 2021-01-29 ENCOUNTER — HOSPITAL ENCOUNTER (OUTPATIENT)
Dept: PHYSICAL THERAPY | Age: 66
Setting detail: THERAPIES SERIES
Discharge: HOME | End: 2021-01-29
Payer: MEDICARE

## 2021-01-29 PROCEDURE — 97110 THERAPEUTIC EXERCISES: CPT | Performed by: PHYSICAL THERAPIST

## 2021-01-29 PROCEDURE — 97140 MANUAL THERAPY 1/> REGIONS: CPT | Performed by: PHYSICAL THERAPIST

## 2021-01-29 NOTE — FLOWSHEET NOTE
Physical Therapy Daily Treatment Note    Date:  2021    Patient Name:  James Pérez    :  1955  MRN: [id number]  Restrictions/Precautions:     Medical/Treatment Diagnosis Information:   · Diagnosis: M79.18 (ICD-10-CM) - Gluteal pain     Insurance/Certification information:  PT Insurance Information: Medicare/ [employer]  Physician Information:  Referring Practitioner: Lyudmila Hayes DO  Plan of care signed (Y/N):  n  Visit# / total visits:  3/10  Pain level: 3/10     Time In: 9:03   Time Out: 9:49    Progress Note: []  Yes  [x]  No  Next due by: Visit #10      Subjective:   \"My left buttock has been feeling better, and not causing as much pain when I bend over or sit for long periods of time. \"     Objective: SANDY per flowsheet to decrease pain and inflammation of the Left sciatic. Completed prone based there-ex with no noted increase in pain. Followed by IDN to decrease pain and muscle tightness.      Observation:  Very large twitch response observed in glute max with dry needling this date   Test measurements:      Exercises:   Exercise/Equipment Resistance/Repetitions Other comments        Prone prop 5'    Prone pressup  10x2    PKB  15x x 2     Prone hip ext 10x x 2          sidelying hip ABD 10x    clamshells 10x    Abd Braceing  5\" x 15     PPT  5\" x 15    LTR  5\" x 10     Bridging  10x          Modified trunk ext  15x    Sidesteps with Ab brace 3 laps    Standing hip ext, ABD 15x each With ab bracing   Alternating pulldowns Green 15x Offset feet x 2   SPO RED 15x         IDN  10    [x] Provided verbal/tactile cueing for activities related to strengthening, flexibility, endurance, ROM. (41566)  [] Provided verbal/tactile cueing for activities related to improving balance, coordination, kinesthetic sense, posture, motor skill, proprioception. (63178)    Therapeutic Activities:     [] Therapeutic activities, direct (one-on-one) patient contact (use of dynamic activities to improve increase in LE pain.       Plan:   [x] Continue per plan of care [] Alter current plan (see comments)  [] Plan of care initiated [] Hold pending MD visit [] Discharge    Plan for Next Session:  Progress as tolerated    Electronically signed by:  Brandy Crisostomo DPT

## 2021-02-03 ENCOUNTER — HOSPITAL ENCOUNTER (OUTPATIENT)
Dept: PHYSICAL THERAPY | Age: 66
Setting detail: THERAPIES SERIES
Discharge: HOME | End: 2021-02-03
Payer: MEDICARE

## 2021-02-03 PROCEDURE — 97140 MANUAL THERAPY 1/> REGIONS: CPT

## 2021-02-03 PROCEDURE — 97110 THERAPEUTIC EXERCISES: CPT

## 2021-02-03 NOTE — FLOWSHEET NOTE
Physical Therapy Daily Treatment Note    Date:  2/3/2021    Patient Name:  James Pérez    :  1955  MRN: [id number]  Restrictions/Precautions:     Medical/Treatment Diagnosis Information:   · Diagnosis: M79.18 (ICD-10-CM) - Gluteal pain     Insurance/Certification information:  PT Insurance Information: Medicare/ [employer]  Physician Information:  Referring Practitioner: Lyudmila Hayes DO  Plan of care signed (Y/N):  n  Visit# / total visits:  4/10  Pain level: 3/10     Time In:  9:03   Time Out: 9:56    Progress Note: []  Yes  [x]  No  Next due by: Visit #10      Subjective:   \"My pain is pretty good today, I only have pain primarily when I am sitting. I think the pain is more dull too. But still located primarily in my bottom. \"   Pt was scheduled for an 8:00 am appt this date. Pt arrived an hour late and was worked into the schedule. .   Objective: Pt reports that she able to sit longer since coming here on her bottom (approx. 10). SANDY per flowsheet to decrease pain and inflammation of the Left sciatic. Completed prone based there-ex with no noted increase in pain. Followed by IDN to decrease pain and muscle tightness. ? Observation:  Very large twitch response observed in glute max with dry needling this date  ?  Test measurements:      Exercises:   Exercise/Equipment Resistance/Repetitions Other comments   Backwards Treadmill Walking 6'         Prone prop 5'    Prone pressup  15x2    PKB  15x x 2     Prone hip ext 15x x 2     Prone supermans     sidelying hip ABD 15x    Clamshells 1, clamshell 2 15x    Abd Braceing      PPT  5\" x 15    LTR  5\" x 15     Bridging  10x          Modified trunk ext  15x    Sidesteps with Ab brace 3 laps    Standing hip ext, ABD 15x each With ab bracing   Alternating pulldowns Green 15x Offset feet x 2   SPO RED 15x    Multifidus isometric 3 x 30\"         IDN  10' [x] Provided verbal/tactile cueing for activities related to strengthening, flexibility, endurance, ROM. (26809)  [] Provided verbal/tactile cueing for activities related to improving balance, coordination, kinesthetic sense, posture, motor skill, proprioception. (43382)    Therapeutic Activities:     [] Therapeutic activities, direct (one-on-one) patient contact (use of dynamic activities to improve functional performance). (26857)    Gait:   [] Provided training and instruction to the patient for ambulation re-education. (64555)    Self-Care/ADL's  [] Self-care/home management training and compensatory training, meal preparation, safety procedures, and instructions in use of assistive technology devices/adaptive equipment, direct one-on-one contact. (83934)    Home Exercise Program:     [x] Reviewed/Progressed HEP activities related to strengthening, flexibility, endurance, ROM. (44944)  [] Reviewed/Progressed HEP activities related to improving balance, coordination, kinesthetic sense, posture, motor skill, proprioception.  (34786)    Manual Treatments:    [x] Provided manual therapy to mobilize soft tissue/joints for the purpose of modulating pain, promoting relaxation,  increasing ROM, reducing/eliminating soft tissue swelling/inflammation/restriction, improving soft tissue extensibility. (16267)   -IDN performed this date by [employer], PT,DPT to decrease pain, trigger points, tone, and spasm as well as increase tissue extensibility. Specific placement and dose can be seen on separately scanned image.        Service Based Modalities:      Timed Code Treatment Minutes:   8' man, 37' ex    Total Treatment Minutes:   48'    Treatment/Activity Tolerance:  [x] Patient tolerated treatment well [] Patient limited by fatique  [] Patient limited by pain  [] Patient limited by other medical complications  [] Other:     Prognosis: [x] Good [] Fair  [] Poor    Patient Requires Follow-up: [x] Yes  [] No      Goals: Short term goals  Time Frame for Short term goals: 3 weeks  Short term goal 1: Initiate HEP    Long term goals  Time Frame for Long term goals : 6 weeks  Long term goal 1: Indpendent in HEP  Long term goal 2: Improve LE strength to 5/5 to ease gait and transfers  Long term goal 3: Patient to note 50% reduction in radicular symptoms. Long term goal 4: Patient to be able to sit up to 45 min without increase in LE pain.       Plan:   [x] Continue per plan of care [] Alter current plan (see comments)  [] Plan of care initiated [] Hold pending MD visit [] Discharge    Plan for Next Session:  Progress as tolerated    Electronically signed by:  THEODORE Kwon

## 2021-02-05 ENCOUNTER — HOSPITAL ENCOUNTER (OUTPATIENT)
Dept: PHYSICAL THERAPY | Age: 66
Setting detail: THERAPIES SERIES
Discharge: HOME | End: 2021-02-05
Payer: MEDICARE

## 2021-02-05 PROCEDURE — 97140 MANUAL THERAPY 1/> REGIONS: CPT

## 2021-02-05 PROCEDURE — 97110 THERAPEUTIC EXERCISES: CPT

## 2021-02-05 NOTE — FLOWSHEET NOTE
Physical Therapy Daily Treatment Note    Date:  2021    Patient Name:  Nara Chicas    :  1955  MRN: [id number]  Restrictions/Precautions:     Medical/Treatment Diagnosis Information:   · Diagnosis: M79.18 (ICD-10-CM) - Gluteal pain     Insurance/Certification information:  PT Insurance Information: Medicare/ [employer]  Physician Information:  Referring Practitioner: Meaghan Rivera DO  Plan of care signed (Y/N):  y  Visit# / total visits:  5/10  Pain level: 2/10     Time In:  149   Time Out: 855    Progress Note: []  Yes  [x]  No  Next due by: Visit #10      Subjective:   Pain of 2/10 this date. Patient noting dull ache in the glut region. Patient noting intensity is better since onset of PT. Objective: Pt reports that she able to sit longer since coming here on her bottom (approx. 10). SANDY per flowsheet to decrease pain and inflammation of the Left sciatic. Completed prone based there-ex with no noted increase in pain. Followed by IDN with [employer]im to decrease pain and muscle tightness. Moderate twitch noted with Estim. ?  Observation:    ? Test measurements:      Exercises:   Exercise/Equipment Resistance/Repetitions Other comments   Backwards Treadmill Walking 6'         Prone prop 5'    Prone pressup  15x2    PKB  15x x 2     Prone hip ext 15x x 2     Prone supermans     sidelying hip ABD 15x    Clamshells 1, clamshell 2 15x    Abd Braceing      PPT  5\" x 15    LTR  5\" x 10     Bridging  15x          Modified trunk ext  15x    Sidesteps with Ab brace 3 laps    Standing hip ext, ABD 20x each With ab bracing   Alternating pulldowns Green 15x Offset feet x 2   SPO RED 15x    Multifidus isometric          IDN  10'    [x] Provided verbal/tactile cueing for activities related to strengthening, flexibility, endurance, ROM. (46052)  [] Provided verbal/tactile cueing for activities related to improving balance, coordination, kinesthetic sense, posture, motor skill, proprioception. (80249) Therapeutic Activities:     [] Therapeutic activities, direct (one-on-one) patient contact (use of dynamic activities to improve functional performance). (79956)    Gait:   [] Provided training and instruction to the patient for ambulation re-education. (47845)    Self-Care/ADL's  [] Self-care/home management training and compensatory training, meal preparation, safety procedures, and instructions in use of assistive technology devices/adaptive equipment, direct one-on-one contact. (92307)    Home Exercise Program:     [x] Reviewed/Progressed HEP activities related to strengthening, flexibility, endurance, ROM. (87522)  [] Reviewed/Progressed HEP activities related to improving balance, coordination, kinesthetic sense, posture, motor skill, proprioception.  (96573)    Manual Treatments:    [x] Provided manual therapy to mobilize soft tissue/joints for the purpose of modulating pain, promoting relaxation,  increasing ROM, reducing/eliminating soft tissue swelling/inflammation/restriction, improving soft tissue extensibility. (72508)   -IDN performed this date by Miguel Reid PT, to decrease pain, trigger points, tone, and spasm as well as increase tissue extensibility. Specific placement and dose can be seen on separately scanned image.        Service Based Modalities:      Timed Code Treatment Minutes:   8' man, 37' ex    Total Treatment Minutes:   48'    Treatment/Activity Tolerance:  [x] Patient tolerated treatment well [] Patient limited by fatique  [] Patient limited by pain  [] Patient limited by other medical complications  [] Other:     Prognosis: [x] Good [] Fair  [] Poor    Patient Requires Follow-up: [x] Yes  [] No      Goals:  Short term goals  Time Frame for Short term goals: 3 weeks  Short term goal 1: Initiate HEP (met)    Long term goals  Time Frame for Long term goals : 6 weeks  Long term goal 1: Indpendent in HEP  Long term goal 2: Improve LE strength to 5/5 to ease gait and transfers Long term goal 3: Patient to note 50% reduction in radicular symptoms. (improved \"at least 50%\" 2/5)  Long term goal 4: Patient to be able to sit up to 45 min without increase in LE pain.  (sitting 5-10 min then needs to stand 2/5)      Plan:   [x] Continue per plan of care [] Alter current plan (see comments)  [] Plan of care initiated [] Hold pending MD visit [] Discharge    Plan for Next Session:  Progress as tolerated    Electronically signed by:  Charles King PT

## 2021-02-09 ENCOUNTER — APPOINTMENT (OUTPATIENT)
Dept: PHYSICAL THERAPY | Age: 66
End: 2021-02-09
Payer: MEDICARE

## 2021-02-10 ENCOUNTER — TELEPHONE (OUTPATIENT)
Dept: CARDIOLOGY | Age: 66
End: 2021-02-10

## 2021-02-10 ENCOUNTER — OFFICE VISIT (OUTPATIENT)
Dept: CARDIOLOGY | Age: 66
End: 2021-02-10
Payer: MEDICARE

## 2021-02-10 VITALS
DIASTOLIC BLOOD PRESSURE: 72 MMHG | BODY MASS INDEX: 25.27 KG/M2 | HEIGHT: 64 IN | SYSTOLIC BLOOD PRESSURE: 144 MMHG | WEIGHT: 148 LBS | HEART RATE: 80 BPM

## 2021-02-10 DIAGNOSIS — R06.09 DYSPNEA ON EXERTION: Primary | ICD-10-CM

## 2021-02-10 DIAGNOSIS — R93.1 ABNORMAL ECHOCARDIOGRAM: ICD-10-CM

## 2021-02-10 DIAGNOSIS — R09.89 BRUIT OF LEFT CAROTID ARTERY: ICD-10-CM

## 2021-02-10 DIAGNOSIS — E78.5 HYPERLIPIDEMIA, UNSPECIFIED HYPERLIPIDEMIA TYPE: ICD-10-CM

## 2021-02-10 PROCEDURE — 3017F COLORECTAL CA SCREEN DOC REV: CPT | Performed by: INTERNAL MEDICINE

## 2021-02-10 PROCEDURE — 99204 OFFICE O/P NEW MOD 45 MIN: CPT | Performed by: INTERNAL MEDICINE

## 2021-02-10 PROCEDURE — G8417 CALC BMI ABV UP PARAM F/U: HCPCS | Performed by: INTERNAL MEDICINE

## 2021-02-10 PROCEDURE — 1036F TOBACCO NON-USER: CPT | Performed by: INTERNAL MEDICINE

## 2021-02-10 PROCEDURE — 1123F ACP DISCUSS/DSCN MKR DOCD: CPT | Performed by: INTERNAL MEDICINE

## 2021-02-10 PROCEDURE — G8400 PT W/DXA NO RESULTS DOC: HCPCS | Performed by: INTERNAL MEDICINE

## 2021-02-10 PROCEDURE — G8427 DOCREV CUR MEDS BY ELIG CLIN: HCPCS | Performed by: INTERNAL MEDICINE

## 2021-02-10 PROCEDURE — G8484 FLU IMMUNIZE NO ADMIN: HCPCS | Performed by: INTERNAL MEDICINE

## 2021-02-10 PROCEDURE — 1090F PRES/ABSN URINE INCON ASSESS: CPT | Performed by: INTERNAL MEDICINE

## 2021-02-10 PROCEDURE — 93005 ELECTROCARDIOGRAM TRACING: CPT | Performed by: INTERNAL MEDICINE

## 2021-02-10 PROCEDURE — 4040F PNEUMOC VAC/ADMIN/RCVD: CPT | Performed by: INTERNAL MEDICINE

## 2021-02-10 PROCEDURE — 99214 OFFICE O/P EST MOD 30 MIN: CPT | Performed by: INTERNAL MEDICINE

## 2021-02-10 PROCEDURE — 93010 ELECTROCARDIOGRAM REPORT: CPT | Performed by: INTERNAL MEDICINE

## 2021-02-10 NOTE — TELEPHONE ENCOUNTER
Patient leaving for vacation for one month on feb 18 th. She will not be flying. Would like results of stress and Carotid US before with ok to travel.

## 2021-02-10 NOTE — PROGRESS NOTES
Today's Date: 2/10/2021  Patient's Name: Lluvia Walton  Patient's age: 77 y.o., 1955    Subjective:  Lluvia Walton  is here to establish cardiology care. She had an echo which showed LVEF 55% and mild to moderate regurgitation. She reports being fairly active. She denies any chest pain. She reports dyspnea on exertion. She has h/o CVA and is on ASA/plavix. She had a loop recorder    BP on 1/14/2021 apt was 128/84      Past Medical History:   has a past medical history of Cerebrovascular accident (CVA) due to occlusion of left vertebral artery ([address] Corneal neovascularization, Elevated fasting blood sugar, Hyperlipidemia, Hypothyroidism, IFG (impaired fasting glucose), Knee pain, Myopia with astigmatism, Neck pain, and Osteoarthritis. Past Surgical History:   has a past surgical history that includes Colonoscopy (05/2008); Insertable Cardiac Monitor (06/12/2017); Tonsillectomy; and pr colonoscopy flx dx w/collj spec when pfrmd (N/A, 7/11/2018). Home Medications:  Prior to Admission medications    Medication Sig Start Date End Date Taking? Authorizing Provider   levothyroxine (SYNTHROID) 112 MCG tablet Take 1 tablet by mouth daily 1/14/21  Yes Leonor Otero DO   clopidogrel (PLAVIX) 75 MG tablet take 1 tablet by mouth once daily 1/14/21  Yes Leonor Otero DO   atorvastatin (LIPITOR) 40 MG tablet take 1 tablet by mouth at bedtime 1/14/21  Yes Leonor Otero DO   Probiotic Product (PROBIOTIC DAILY PO) Take by mouth   Yes Historical Provider, MD   aspirin 81 MG tablet Take 81 mg by mouth daily   Yes Historical Provider, MD       Allergies:  Patient has no known allergies. Social History:   reports that she quit smoking about 19 years ago. Her smoking use included cigarettes. She started smoking about 48 years ago. She has a 50.00 pack-year smoking history. She has never used smokeless tobacco. She reports current alcohol use. She reports that she does not use drugs. Family History: family history includes Cancer in her father; Heart Disease in her maternal grandfather, maternal grandmother, paternal grandfather, and paternal grandmother; Stroke in her maternal grandfather, maternal grandmother, paternal grandfather, and paternal grandmother; Thyroid Disease in her mother. No h/o sudden cardiac death. No for premature CAD    REVIEW OF SYSTEMS:    · Constitutional: there has been no unanticipated weight loss. There's been No change in energy level, No change in activity level. · Eyes: No visual changes or diplopia. No scleral icterus. · ENT: No Headaches, hearing loss or vertigo. No mouth sores or sore throat. · Cardiovascular: see above  · Respiratory: see above  · Gastrointestinal: No abdominal pain, appetite loss, blood in stools. · Genitourinary: No dysuria, trouble voiding, or hematuria. · Musculoskeletal:  No gait disturbance, No weakness or joint complaints. · Integumentary: No rash or pruritis. · Neurological: No headache or diplopia. No tingling. H/o CVA  · Psychiatric: No anxiety, or depression. · Endocrine: No temperature intolerance. · Hematologic/Lymphatic: No abnormal bruising or bleeding, blood clots or swollen lymph nodes. · Allergic/Immunologic: No nasal congestion or hives. PHYSICAL EXAM:      BP (!) 183/84   Pulse 90   Ht 5' 4\" (1.626 m)   Wt 148 lb (67.1 kg)   BMI 25.40 kg/m²    Constitutional and General Appearance: alert, cooperative, no distress and appears stated age  [de-identified]: PERRL, no cervical lymphadenopathy. No masses palpable. Normal oral mucosa  Respiratory:  · Normal excursion and expansion without use of accessory muscles  · Resp Auscultation: Good respiratory effort. No for increased work of breathing.  On auscultation: clear to auscultation bilaterally  Cardiovascular:  · Heart tones are crisp and normal. regular S1 and S2. 2/6 systolic murmur  · Jugular venous pulsation Normal  · Faint left carotid bruit  Abdomen:   · soft · Bowel sounds present  Extremities:  ·  No edema  Neurological:  · Alert and oriented. Cardiac Data:  EKG: NSR, NL ECG    Labs:     CBC: No results for input(s): WBC, HGB, HCT, PLT in the last 72 hours. BMP: No results for input(s): NA, K, CO2, BUN, CREATININE, LABGLOM, GLUCOSE in the last 72 hours. PT/INR: No results for input(s): PROTIME, INR in the last 72 hours. FASTING LIPID PANEL:  Lab Results   Component Value Date    HDL 59 01/14/2021    TRIG 187 01/14/2021     LIVER PROFILE:No results for input(s): AST, ALT, LABALBU in the last 72 hours. TTE 01/14/2020  Summary  Normal left ventricular diameter. Left ventricular systolic function is normal.  Left ventricular ejection fraction 69 %. Grade II (moderate) left ventricular diastolic dysfunction. Left atrium is mildly dilated. Aortic leaflet calcification with mild stenosis. Peak instantaneous gradient 21 mmHg and mean gradient 12 mmHg. Mild aortic insufficiency. Normal mitral valve leaflets. Mild mitral regurgitation. Normal tricuspid valve leaflets. Mild tricuspid regurgitation. Estimated right ventricular systolic pressure is 38 mmHg. No significant pericardial effusion is seen. TTE 01/26/2021    Summary  Normal left ventricular diameter. Left ventricular systolic function is normal.  Left ventricular ejection fraction 55 %. Grade I (mild) left ventricular diastolic dysfunction. Left atrial size is at the upper limits of normal.  Aortic valve is sclerotic but opens well. Mild to moderate aortic insufficiency. Mild mitral valve thickening with annular calcification. Moderate mitral regurgitation. Normal tricuspid valve leaflets. Mild to moderate tricuspid regurgitation. Estimated right ventricular systolic pressure is 32 mmHg. No significant pericardial effusion is seen. Assessment and plan:    -Dyspnea on exertion- Cor risk factors- had CVA and HL.  Obtain treadmill cardiolite stress test

## 2021-02-11 ENCOUNTER — HOSPITAL ENCOUNTER (OUTPATIENT)
Dept: PHYSICAL THERAPY | Age: 66
Setting detail: THERAPIES SERIES
Discharge: HOME | End: 2021-02-11
Payer: MEDICARE

## 2021-02-11 PROCEDURE — 97110 THERAPEUTIC EXERCISES: CPT

## 2021-02-11 PROCEDURE — 97032 APPL MODALITY 1+ESTIM EA 15: CPT

## 2021-02-11 NOTE — FLOWSHEET NOTE
[] Provided verbal/tactile cueing for activities related to improving balance, coordination, kinesthetic sense, posture, motor skill, proprioception. (85335)    Therapeutic Activities:     [] Therapeutic activities, direct (one-on-one) patient contact (use of dynamic activities to improve functional performance). (72429)    Gait:   [] Provided training and instruction to the patient for ambulation re-education. (24611)    Self-Care/ADL's  [] Self-care/home management training and compensatory training, meal preparation, safety procedures, and instructions in use of assistive technology devices/adaptive equipment, direct one-on-one contact. (41067)    Home Exercise Program:     [x] Reviewed/Progressed HEP activities related to strengthening, flexibility, endurance, ROM. (86823)  [] Reviewed/Progressed HEP activities related to improving balance, coordination, kinesthetic sense, posture, motor skill, proprioception.  (82612)    Manual Treatments:    [x] Provided manual therapy to mobilize soft tissue/joints for the purpose of modulating pain, promoting relaxation,  increasing ROM, reducing/eliminating soft tissue swelling/inflammation/restriction, improving soft tissue extensibility. (95574)   -IDN performed this date by Tatyana Floyd PT, to decrease pain, trigger points, tone, and spasm as well as increase tissue extensibility. Specific placement and dose can be seen on separately scanned image.        Service Based Modalities:      Timed Code Treatment Minutes:   8' man, 40' ex    Total Treatment Minutes:   47'    Treatment/Activity Tolerance:  [x] Patient tolerated treatment well [] Patient limited by fatique  [] Patient limited by pain  [] Patient limited by other medical complications  [] Other:     Prognosis: [x] Good [] Fair  [] Poor    Patient Requires Follow-up: [x] Yes  [] No      Goals:  Short term goals  Time Frame for Short term goals: 3 weeks  Short term goal 1: Initiate HEP (met)    Long term goals Time Frame for Long term goals : 6 weeks  Long term goal 1: Indpendent in HEP  Long term goal 2: Improve LE strength to 5/5 to ease gait and transfers  Long term goal 3: Patient to note 50% reduction in radicular symptoms. (improved \"at least 50%\" 2/5)  Long term goal 4: Patient to be able to sit up to 45 min without increase in LE pain.  (sitting 5-10 min then needs to stand 2/5)      Plan:   [x] Continue per plan of care [] Alter current plan (see comments)  [] Plan of care initiated [] Hold pending MD visit [] Discharge    Plan for Next Session:  Progress as tolerated    Electronically signed by:  Shahzad Houston PT

## 2021-02-12 ENCOUNTER — HOSPITAL ENCOUNTER (OUTPATIENT)
Dept: PHYSICAL THERAPY | Age: 66
Setting detail: THERAPIES SERIES
Discharge: HOME | End: 2021-02-12
Payer: MEDICARE

## 2021-02-12 PROCEDURE — 97110 THERAPEUTIC EXERCISES: CPT

## 2021-02-12 NOTE — PROGRESS NOTES
Physical Therapy    [facility]  Rehabilitation and Sports Medicine    [x] [place]  Phone: [phone]  Fax: [phone]      [] [place]  Phone: [phone]  Fax: [phone]    Physical Therapy Progress Note  Date: 2021        Patient Name:  Cristina Fitzgerald    :  1955  MRN: [id number]  Restrictions/Precautions:     Medical/Treatment Diagnosis Information:   · Diagnosis: M79.18 (ICD-10-CM) - Gluteal pain  ·   Insurance/Certification information:  PT Insurance Information: Medicare/ [employer]  Physician Information:  Referring Practitioner: Cesar Henning DO  Plan of care signed (Y/N):  y  Visit# / total visits:  7/10  Pain level:      2/10       Time Period for Report:  20-21  Cancels/No-shows to date:  0    Plan of Care/Treatment to date:  [x] Therapeutic Exercise    [x] Modalities:  [x] Therapeutic Activity     [] Ultrasound  [] Electrical Stimulation  [x] Gait Training      [] Cervical Traction    [] Lumbar Traction  [x] Neuromuscular Re-education  [] Cold/hotpack [] Iontophoresis  [x] Instruction in HEP      Other:  [x] Manual Therapy       []    [] Aquatic Therapy       []                    ? Subjective:    Pain of 0/10 this date. Patient noting able to sit for a longer period of time. Patient travelling to [place] x 1 month. Patient noting would like to be placed on Hold x 4 weeks and will return as needed. States nearly 100% improvement since start of PT.       · Objective: Pt reports that she able to sit longer since coming here on her bottom (approx. 10). SANDY per flowsheet to decrease pain and inflammation of the Left sciatic. Completed prone based there-ex with no noted increase in pain. Patient states is independent in HEP. Patient noting nearly 100% overall improved therefore hold IDN this date. ·    · Observation:    Test measurements:  Hip/knee       Assessment:   Patient leaving for [place], plan to place on Hold x 4 weeks. Plan:    Hold x 4 weeks        Goals:    Short term goals  Time Frame for Short term goals: 3 weeks  Short term goal 1: Initiate HEP (met)     Long term goals  Time Frame for Long term goals : 6 weeks  Long term goal 1: Indpendent in HEP (met)   Long term goal 2: Improve LE strength to 5/5 to ease gait and transfers (met 5/5)  Long term goal 3: Patient to note 50% reduction in radicular symptoms. (met 100%)  Long term goal 4: Patient to be able to sit up to 45 min without increase in LE pain. (met sitting 45 min with driving)           Current Frequency/Duration:2/12/21-3/11/21  # Days per week: [] 1 day # Weeks: [] 1 week [x] 4 weeks      [x] 2 days? [] 2 weeks [] 5 weeks      [] 3 days   [] 3 weeks [] 6 weeks     Rehab Potential: [] Excellent [x] Good [] Fair  [] Poor     Goal Status:  [] Achieved [x] Partially Achieved  [] Not Achieved     Patient Status: [] Continue per initial plan of Care     [] Patient now discharged     [x] Additional visits requested, Please re-certify for additional visits:      Requested frequency/duration:  X/week for weeks    Electronically signed by:  Malinda Montalvo PT    If you have any questions or concerns, please don't hesitate to call.   Thank you for your referral.    Physician Signature:________________________________Date:__________________  By signing above, therapists plan is approved by physician

## 2021-02-12 NOTE — FLOWSHEET NOTE
Physical Therapy Daily Treatment Note    Date:  2021    Patient Name:  Niya Turner    :  1955  MRN: [id number]  Restrictions/Precautions:     Medical/Treatment Diagnosis Information:   · Diagnosis: M79.18 (ICD-10-CM) - Gluteal pain     Insurance/Certification information:  PT Insurance Information: Medicare/ [employer]  Physician Information:  Referring Practitioner: Sandie Angelo DO  Plan of care signed (Y/N):  y  Visit# / total visits:  7/10  Pain level: 2/10     Time In:  948   Time Out: 840    Progress Note: []  Yes  [x]  No  Next due by: Visit #10      Subjective:   Pain of 0/10 this date. Patient noting able to sit for a longer period of time. Patient travelling to [place] x 1 month. Patient noting would like to be placed on Hold x 4 weeks and will return as needed. States nearly 100% improvement since start of PT.      ? Objective: Pt reports that she able to sit longer since coming here on her bottom (approx. 10). SANDY per flowsheet to decrease pain and inflammation of the Left sciatic. Completed prone based there-ex with no noted increase in pain. Patient states is independent in HEP. Patient noting nearly 100% overall improved therefore hold IDN this date.     ?   ? Observation:    ? Test measurements:  Hip/knee 5/5    Exercises:   Exercise/Equipment Resistance/Repetitions Other comments   Backwards Treadmill Walking 6'         Prone prop 5'    Prone pressup  15x2    PKB  15x x 2     Prone hip ext 15x x 2     Prone supermans     sidelying hip ABD 15x    sidelying hip [place]  10x    Clamshells 1, clamshell 2 15x    Abd Braceing  5\" x 15     PPT  5\" x 15    LTR  5\" x 10     Bridging  15x          Modified trunk ext  15x    Sidesteps with Ab brace 3 laps    Standing hip ext, ABD 20x each With ab bracing   Alternating pulldowns Green 15x Offset feet x 2   SPO Green 15x    Multifidus isometric          IDN [x] Provided verbal/tactile cueing for activities related to strengthening, flexibility, endurance, ROM. (56898)  [] Provided verbal/tactile cueing for activities related to improving balance, coordination, kinesthetic sense, posture, motor skill, proprioception. (77774)    Therapeutic Activities:     [] Therapeutic activities, direct (one-on-one) patient contact (use of dynamic activities to improve functional performance). (28005)    Gait:   [] Provided training and instruction to the patient for ambulation re-education. (39300)    Self-Care/ADL's  [] Self-care/home management training and compensatory training, meal preparation, safety procedures, and instructions in use of assistive technology devices/adaptive equipment, direct one-on-one contact. (80754)    Home Exercise Program:     [x] Reviewed/Progressed HEP activities related to strengthening, flexibility, endurance, ROM. (26227)  [] Reviewed/Progressed HEP activities related to improving balance, coordination, kinesthetic sense, posture, motor skill, proprioception.  (55669)    Manual Treatments:    [] Provided manual therapy to mobilize soft tissue/joints for the purpose of modulating pain, promoting relaxation,  increasing ROM, reducing/eliminating soft tissue swelling/inflammation/restriction, improving soft tissue extensibility.  (44698)     Service Based Modalities:      Timed Code Treatment Minutes: 45' ex    Total Treatment Minutes:   45'    Treatment/Activity Tolerance:  [x] Patient tolerated treatment well [] Patient limited by fatique  [] Patient limited by pain  [] Patient limited by other medical complications  [] Other:     Prognosis: [x] Good [] Fair  [] Poor    Patient Requires Follow-up: [x] Yes  [] No      Goals:  Short term goals  Time Frame for Short term goals: 3 weeks  Short term goal 1: Initiate HEP (met)    Long term goals  Time Frame for Long term goals : 6 weeks  Long term goal 1: Indpendent in HEP (met)

## 2021-02-15 ENCOUNTER — HOSPITAL ENCOUNTER (OUTPATIENT)
Dept: NON INVASIVE DIAGNOSTICS | Age: 66
Discharge: HOME | End: 2021-02-15
Payer: MEDICARE

## 2021-02-15 ENCOUNTER — HOSPITAL ENCOUNTER (OUTPATIENT)
Dept: INTERVENTIONAL RADIOLOGY/VASCULAR | Age: 66
Discharge: HOME | End: 2021-02-17
Payer: MEDICARE

## 2021-02-15 ENCOUNTER — HOSPITAL ENCOUNTER (OUTPATIENT)
Dept: NUCLEAR MEDICINE | Age: 66
Discharge: HOME | End: 2021-02-17
Payer: MEDICARE

## 2021-02-15 DIAGNOSIS — R06.09 DYSPNEA ON EXERTION: ICD-10-CM

## 2021-02-15 DIAGNOSIS — R09.89 BRUIT OF LEFT CAROTID ARTERY: ICD-10-CM

## 2021-02-15 PROCEDURE — A9500 TC99M SESTAMIBI: HCPCS | Performed by: INTERNAL MEDICINE

## 2021-02-15 PROCEDURE — 93018 CV STRESS TEST I&R ONLY: CPT | Performed by: INTERNAL MEDICINE

## 2021-02-15 PROCEDURE — 93017 CV STRESS TEST TRACING ONLY: CPT

## 2021-02-15 PROCEDURE — 93016 CV STRESS TEST SUPVJ ONLY: CPT | Performed by: INTERNAL MEDICINE

## 2021-02-15 PROCEDURE — 3430000000 HC RX DIAGNOSTIC RADIOPHARMACEUTICAL: Performed by: INTERNAL MEDICINE

## 2021-02-15 PROCEDURE — 93880 EXTRACRANIAL BILAT STUDY: CPT

## 2021-02-15 PROCEDURE — 78452 HT MUSCLE IMAGE SPECT MULT: CPT

## 2021-02-15 RX ORDER — PHENOL 1.4 %
1 AEROSOL, SPRAY (ML) MUCOUS MEMBRANE DAILY
COMMUNITY

## 2021-02-15 RX ADMIN — TETRAKIS(2-METHOXYISOBUTYLISOCYANIDE)COPPER(I) TETRAFLUOROBORATE 30 MILLICURIE: 1 INJECTION, POWDER, LYOPHILIZED, FOR SOLUTION INTRAVENOUS at 12:20

## 2021-02-15 RX ADMIN — TETRAKIS(2-METHOXYISOBUTYLISOCYANIDE)COPPER(I) TETRAFLUOROBORATE 10 MILLICURIE: 1 INJECTION, POWDER, LYOPHILIZED, FOR SOLUTION INTRAVENOUS at 12:20

## 2021-02-15 NOTE — PROCEDURES
[facility] [address]                 [address] [place] [id number]                              CARDIAC STRESS TEST    PATIENT NAME: Benjamin Birch                    :        1955  MED REC NO:   [id number]                             ROOM:  ACCOUNT NO:   [de-identified]                           ADMIT DATE: 02/15/2021  PROVIDER:     Wes Menezes DO    DATE OF STUDY:  02/15/2021    STRESS TEST    Ordering Provider:  Martin Poe MD    Primary Care Provider: Gina Jorge DO    Patient's Age: 77    Height: 5 feet, 4 inches  Weight: 148 pounds    INDICATION:  Shortness of breath. Dyspnea on exertion. Predicted Heart Rate: Maximum 153     85%: 131    Heart Rate Restin   Standin    Blood pressure restin/80   Standin/70    Exercise protocol used:  Dieudonne  Exercise Duration/Stage: 6:00; stage II    Test terminated due to:  Shortness of breath. Imaging: Cardiolite    Maximum heart rate: 153  (100%)  Max blood pressure: 190/80  Max Workload: 7.00 METS    Chest Pain: No.     Onset: N/A          Severity: N/A    Baseline EKG: Normal sinus rhythm. Normal ECG. EKG changes: None. Arrhythmias:  None. Interpretation:  1. Negative ECG portion of treadmill Cardiolite stress test.  2. Await Nuclear portion. 3. Below average exercise capacity: 6:00 minutes/7.00 METS      Nuclear Myocardial Perfusion Imaging (SPECT)    TESTING METHOD  STRESS:   Treadmill  AGENT:    Cardiolite  REST:          Injection Date:     2/15/21 Time: 1007 Amount: 11.12 mCi  STRESS:   Injection Date:     2/15/21 Time: 1140 Amount: 32.4 mCi  IMAGE TIME:    Rest: 1100  Stress:  1215    EF:  69%  TID:  1.02  LHR:  0.21    FINDINGS:  Ischemia (Reversible Defect):           No.  Infarction (Irreversible Defect):       No.  Ejection fraction Calculated:           Normal.  Segmental wall motion:                  Normal.    IMPRESSION:  1. No ischemia or infarct. 2. Left ventricular ejection fraction 69%, with normal wall motion.           Lucia Salguero DO    D: 02/15/2021 15:07:02       T: 02/15/2021 15:20:31     /[email or web address]  Job#: [id number]     Doc#: Unknown    CC:  MD Michelle Ellis

## 2021-02-15 NOTE — PROGRESS NOTES
Patient Name:  Yandel Osborne MRN:  [id number]   :  1955  Age:  77 y.o. Sex: female   Ordering Provider: Aundrea Harmon MD  Referring Provider: Andrea Dahl DO  Primary Care Provider:  Andrea Dahl DO     Indications: Dyspnea on Exertion   Medications:     Current Outpatient Medications:     calcium carbonate 600 MG TABS tablet, Take 1 tablet by mouth daily, Disp: , Rfl:     levothyroxine (SYNTHROID) 112 MCG tablet, Take 1 tablet by mouth daily, Disp: 90 tablet, Rfl: 1    clopidogrel (PLAVIX) 75 MG tablet, take 1 tablet by mouth once daily, Disp: 90 tablet, Rfl: 1    atorvastatin (LIPITOR) 40 MG tablet, take 1 tablet by mouth at bedtime, Disp: 90 tablet, Rfl: 1    Probiotic Product (PROBIOTIC DAILY PO), Take by mouth, Disp: , Rfl:     aspirin 81 MG tablet, Take 81 mg by mouth daily, Disp: , Rfl:   No current facility-administered medications for this encounter.      Facility-Administered Medications Ordered in Other Encounters:     technetium sestamibi (CARDIOLITE) injection 30 millicurie, 30 millicurie, Intravenous, ONCE PRN, Aundrea Harmon MD    technetium sestamibi (CARDIOLITE) injection 10 millicurie, 10 millicurie, Intravenous, ONCE PRN, Aundrea Harmon MD     ------------------------------------------------------------------------------------------------    Predicted Heart Rate:  Maximum:  153   85%:  131     Heart Rate Restin   Standin     Blood Pressure Restin/80  Standin/70     Exercise Protocol Used:  ANASTASIA    Exercise Duration/Stage:  6:00 (II)     Test terminated due to: Shortness of Breath Imaging: Cardiolite     Maximum Heart Rate:  153 (100%) Max BP: 190/80  Max Workload: 7.00 METS     Chest Pain: No  Onset:  n/a  Severity:  n/a     Electronically signed by Babar Cruz RN on 2/15/21 at 11:36 AM EST  -------------------------------------------------------------------------------------------------  Baseline EKG :  NSR, normal ECG

## 2021-02-15 NOTE — TELEPHONE ENCOUNTER
Left detailed message on voicemail with results of stress, asked pt to call us with any questions and keep their follow-up appointment with Cardiology.

## 2021-04-09 NOTE — DISCHARGE SUMMARY
Time Frame for Short term goals: 3 weeks  Short term goal 1: Initiate HEP (met)     Long term goals  Time Frame for Long term goals : 6 weeks  Long term goal 1: Indpendent in HEP (met)   Long term goal 2: Improve LE strength to 5/5 to ease gait and transfers (met 5/5)  Long term goal 3: Patient to note 50% reduction in radicular symptoms. (met 100%)  Long term goal 4: Patient to be able to sit up to 45 min without increase in LE pain. (met sitting 45 min with driving)           Current Frequency/Duration  # Days per week: [] 1 day # Weeks: [] 1 week [] 4 weeks      [] 2 days? [] 2 weeks [] 5 weeks      [] 3 days   [] 3 weeks [] 6 weeks     Rehab Potential: [] Excellent [x] Good [] Fair  [] Poor     Goal Status:  [] Achieved [x] Partially Achieved  [] Not Achieved     Patient Status: [] Continue per initial plan of Care     [x] Patient now discharged     [] Additional visits requested, Please re-certify for additional visits:      Requested frequency/duration:  X/week for weeks    Electronically signed by:  Shellie Swartz PT    If you have any questions or concerns, please don't hesitate to call.   Thank you for your referral.    Physician Signature:________________________________Date:__________________  By signing above, therapists plan is approved by physician

## 2021-07-30 ENCOUNTER — HOSPITAL ENCOUNTER (OUTPATIENT)
Dept: LAB | Age: 66
Discharge: HOME | End: 2021-07-30
Payer: MEDICARE

## 2021-07-30 DIAGNOSIS — E78.2 MIXED HYPERLIPIDEMIA: ICD-10-CM

## 2021-07-30 DIAGNOSIS — R73.01 IMPAIRED FASTING GLUCOSE: ICD-10-CM

## 2021-07-30 DIAGNOSIS — E03.9 ACQUIRED HYPOTHYROIDISM: ICD-10-CM

## 2021-07-30 LAB
ABSOLUTE EOS #: 0.1 K/UL (ref 0–0.44)
ABSOLUTE IMMATURE GRANULOCYTE: <0.03 K/UL (ref 0–0.3)
ABSOLUTE LYMPH #: 1.91 K/UL (ref 1.1–3.7)
ABSOLUTE MONO #: 0.3 K/UL (ref 0.1–1.2)
ALBUMIN SERPL-MCNC: 4.7 G/DL (ref 3.5–5.2)
ALBUMIN/GLOBULIN RATIO: 1.6 (ref 1–2.5)
ALP BLD-CCNC: 125 U/L (ref 35–104)
ALT SERPL-CCNC: 18 U/L (ref 5–33)
ANION GAP SERPL CALCULATED.3IONS-SCNC: 9 MMOL/L (ref 9–17)
AST SERPL-CCNC: 18 U/L
BASOPHILS # BLD: 1 % (ref 0–2)
BASOPHILS ABSOLUTE: 0.03 K/UL (ref 0–0.2)
BILIRUB SERPL-MCNC: 1.1 MG/DL (ref 0.3–1.2)
BUN BLDV-MCNC: 17 MG/DL (ref 8–23)
BUN/CREAT BLD: 23 (ref 9–20)
CALCIUM SERPL-MCNC: 10.2 MG/DL (ref 8.6–10.4)
CHLORIDE BLD-SCNC: 103 MMOL/L (ref 98–107)
CHOLESTEROL/HDL RATIO: 3.2
CHOLESTEROL: 166 MG/DL
CO2: 28 MMOL/L (ref 20–31)
CREAT SERPL-MCNC: 0.73 MG/DL (ref 0.5–0.9)
DIFFERENTIAL TYPE: NORMAL
EOSINOPHILS RELATIVE PERCENT: 2 % (ref 1–4)
GFR AFRICAN AMERICAN: >60 ML/MIN
GFR NON-AFRICAN AMERICAN: >60 ML/MIN
GFR SERPL CREATININE-BSD FRML MDRD: ABNORMAL ML/MIN/{1.73_M2}
GFR SERPL CREATININE-BSD FRML MDRD: ABNORMAL ML/MIN/{1.73_M2}
GLUCOSE BLD-MCNC: 110 MG/DL (ref 70–99)
HCT VFR BLD CALC: 44.1 % (ref 36.3–47.1)
HDLC SERPL-MCNC: 52 MG/DL
HEMOGLOBIN: 14.4 G/DL (ref 11.9–15.1)
IMMATURE GRANULOCYTES: 0 %
LDL CHOLESTEROL: 89 MG/DL (ref 0–130)
LYMPHOCYTES # BLD: 37 % (ref 24–43)
MCH RBC QN AUTO: 31.6 PG (ref 25.2–33.5)
MCHC RBC AUTO-ENTMCNC: 32.7 G/DL (ref 25.2–33.5)
MCV RBC AUTO: 96.9 FL (ref 82.6–102.9)
MONOCYTES # BLD: 6 % (ref 3–12)
NRBC AUTOMATED: 0 PER 100 WBC
PDW BLD-RTO: 11.9 % (ref 11.8–14.4)
PLATELET # BLD: 236 K/UL (ref 138–453)
PLATELET ESTIMATE: NORMAL
PMV BLD AUTO: 9.8 FL (ref 8.1–13.5)
POTASSIUM SERPL-SCNC: 4.6 MMOL/L (ref 3.7–5.3)
RBC # BLD: 4.55 M/UL (ref 3.95–5.11)
RBC # BLD: NORMAL 10*6/UL
SEG NEUTROPHILS: 54 % (ref 36–65)
SEGMENTED NEUTROPHILS ABSOLUTE COUNT: 2.77 K/UL (ref 1.5–8.1)
SODIUM BLD-SCNC: 140 MMOL/L (ref 135–144)
THYROXINE, FREE: 1.44 NG/DL (ref 0.93–1.7)
TOTAL PROTEIN: 7.6 G/DL (ref 6.4–8.3)
TRIGL SERPL-MCNC: 124 MG/DL
TSH SERPL DL<=0.05 MIU/L-ACNC: 1.73 MIU/L (ref 0.3–5)
VLDLC SERPL CALC-MCNC: NORMAL MG/DL (ref 1–30)
WBC # BLD: 5.1 K/UL (ref 3.5–11.3)
WBC # BLD: NORMAL 10*3/UL

## 2021-07-30 PROCEDURE — 85025 COMPLETE CBC W/AUTO DIFF WBC: CPT

## 2021-07-30 PROCEDURE — 80061 LIPID PANEL: CPT

## 2021-07-30 PROCEDURE — 80053 COMPREHEN METABOLIC PANEL: CPT

## 2021-07-30 PROCEDURE — 83036 HEMOGLOBIN GLYCOSYLATED A1C: CPT

## 2021-07-30 PROCEDURE — 84443 ASSAY THYROID STIM HORMONE: CPT

## 2021-07-30 PROCEDURE — 84439 ASSAY OF FREE THYROXINE: CPT

## 2021-07-30 PROCEDURE — 36415 COLL VENOUS BLD VENIPUNCTURE: CPT

## 2021-08-01 LAB
ESTIMATED AVERAGE GLUCOSE: 120 MG/DL
HBA1C MFR BLD: 5.8 % (ref 4–6)

## 2021-08-03 ENCOUNTER — OFFICE VISIT (OUTPATIENT)
Dept: FAMILY MEDICINE CLINIC | Age: 66
End: 2021-08-03
Payer: MEDICARE

## 2021-08-03 VITALS — HEART RATE: 88 BPM | SYSTOLIC BLOOD PRESSURE: 138 MMHG | DIASTOLIC BLOOD PRESSURE: 70 MMHG | OXYGEN SATURATION: 98 %

## 2021-08-03 DIAGNOSIS — I63.012 CEREBROVASCULAR ACCIDENT (CVA) DUE TO THROMBOSIS OF LEFT VERTEBRAL ARTERY (HCC): ICD-10-CM

## 2021-08-03 DIAGNOSIS — Z23 NEED FOR SHINGLES VACCINE: ICD-10-CM

## 2021-08-03 DIAGNOSIS — R73.01 IMPAIRED FASTING GLUCOSE: Primary | ICD-10-CM

## 2021-08-03 DIAGNOSIS — E78.2 MIXED HYPERLIPIDEMIA: ICD-10-CM

## 2021-08-03 DIAGNOSIS — E03.9 ACQUIRED HYPOTHYROIDISM: ICD-10-CM

## 2021-08-03 PROCEDURE — 3017F COLORECTAL CA SCREEN DOC REV: CPT | Performed by: FAMILY MEDICINE

## 2021-08-03 PROCEDURE — G8417 CALC BMI ABV UP PARAM F/U: HCPCS | Performed by: FAMILY MEDICINE

## 2021-08-03 PROCEDURE — 99213 OFFICE O/P EST LOW 20 MIN: CPT

## 2021-08-03 PROCEDURE — 1123F ACP DISCUSS/DSCN MKR DOCD: CPT | Performed by: FAMILY MEDICINE

## 2021-08-03 PROCEDURE — G8400 PT W/DXA NO RESULTS DOC: HCPCS | Performed by: FAMILY MEDICINE

## 2021-08-03 PROCEDURE — 1036F TOBACCO NON-USER: CPT | Performed by: FAMILY MEDICINE

## 2021-08-03 PROCEDURE — 4040F PNEUMOC VAC/ADMIN/RCVD: CPT | Performed by: FAMILY MEDICINE

## 2021-08-03 PROCEDURE — 1090F PRES/ABSN URINE INCON ASSESS: CPT | Performed by: FAMILY MEDICINE

## 2021-08-03 PROCEDURE — 99214 OFFICE O/P EST MOD 30 MIN: CPT | Performed by: FAMILY MEDICINE

## 2021-08-03 PROCEDURE — G8427 DOCREV CUR MEDS BY ELIG CLIN: HCPCS | Performed by: FAMILY MEDICINE

## 2021-08-03 RX ORDER — ZOSTER VACCINE RECOMBINANT, ADJUVANTED 50 MCG/0.5
0.5 KIT INTRAMUSCULAR SEE ADMIN INSTRUCTIONS
Qty: 0.5 ML | Refills: 1 | Status: SHIPPED | OUTPATIENT
Start: 2021-08-03 | End: 2022-01-30

## 2021-08-03 RX ORDER — CLOPIDOGREL BISULFATE 75 MG/1
TABLET ORAL
Qty: 90 TABLET | Refills: 1 | Status: SHIPPED | OUTPATIENT
Start: 2021-08-03 | End: 2022-02-09 | Stop reason: SDUPTHER

## 2021-08-03 RX ORDER — LEVOTHYROXINE SODIUM 112 UG/1
112 TABLET ORAL DAILY
Qty: 90 TABLET | Refills: 1 | Status: SHIPPED | OUTPATIENT
Start: 2021-08-03 | End: 2022-02-09 | Stop reason: SDUPTHER

## 2021-08-03 RX ORDER — ATORVASTATIN CALCIUM 40 MG/1
TABLET, FILM COATED ORAL
Qty: 90 TABLET | Refills: 1 | Status: SHIPPED | OUTPATIENT
Start: 2021-08-03 | End: 2022-02-09 | Stop reason: SDUPTHER

## 2021-08-03 ASSESSMENT — ENCOUNTER SYMPTOMS
RHINORRHEA: 0
SORE THROAT: 0
NAUSEA: 0
CONSTIPATION: 0
VOMITING: 0
DIARRHEA: 0
COUGH: 0
EYE REDNESS: 0
SHORTNESS OF BREATH: 0
ABDOMINAL PAIN: 0
TROUBLE SWALLOWING: 0
EYE DISCHARGE: 0
WHEEZING: 0
SINUS PRESSURE: 0

## 2021-08-03 NOTE — PROGRESS NOTES
8/3/2021     Rosalba Partida (:  1955) is a 74 y.o. female, here for evaluation of the following medical concerns:    HPI  Patient comes in today for follow up of chronic health issues Patient seems to be doing relatively well overall. Does have a known history of impaired fasting glucose her blood sugar levels are stable and controlled with her current dietary intake. Did encourage continued low-carb/low sugar diet and routine exercise to keep this optimally controlled. Does have a known history of hyperlipidemia and her cholesterol levels are stable and controlled on her current statin dose. Does have a known history of hypothyroidism her thyroid levels are stable and adequately supplemented on her current thyroid medication. Has a history of CVA continues on Plavix and has not had any further subsequent neurologic issues. Patient otherwise has no other acute complaints today. .  Patient's recent lab reports are as follows:    Results for orders placed or performed during the hospital encounter of 21   T4, Free   Result Value Ref Range    Thyroxine, Free 1.44 0.93 - 1.70 ng/dL   TSH without Reflex   Result Value Ref Range    TSH 1.73 0.30 - 5.00 mIU/L   Lipid Panel   Result Value Ref Range    Cholesterol 166 <200 mg/dL    HDL 52 >40 mg/dL    LDL Cholesterol 89 0 - 130 mg/dL    Chol/HDL Ratio 3.2 <5    Triglycerides 124 <150 mg/dL    VLDL NOT REPORTED 1 - 30 mg/dL   Hemoglobin A1C   Result Value Ref Range    Hemoglobin A1C 5.8 4.0 - 6.0 %    Estimated Avg Glucose 120 mg/dL   Comprehensive Metabolic Panel   Result Value Ref Range    Glucose 110 (H) 70 - 99 mg/dL    BUN 17 8 - 23 mg/dL    CREATININE 0.73 0.50 - 0.90 mg/dL    Bun/Cre Ratio 23 (H) 9 - 20    Calcium 10.2 8.6 - 10.4 mg/dL    Sodium 140 135 - 144 mmol/L    Potassium 4.6 3.7 - 5.3 mmol/L    Chloride 103 98 - 107 mmol/L    CO2 28 20 - 31 mmol/L    Anion Gap 9 9 - 17 mmol/L    Alkaline Phosphatase 125 (H) 35 - 104 U/L    ALT 18 5 - 33 U/L AST 18 <32 U/L    Total Bilirubin 1.10 0.3 - 1.2 mg/dL    Total Protein 7.6 6.4 - 8.3 g/dL    Albumin 4.7 3.5 - 5.2 g/dL    Albumin/Globulin Ratio 1.6 1.0 - 2.5    GFR Non-African American >60 >60 mL/min    GFR African American >60 >60 mL/min    GFR Comment          GFR Staging NOT REPORTED    CBC Auto Differential   Result Value Ref Range    WBC 5.1 3.5 - 11.3 k/uL    RBC 4.55 3.95 - 5.11 m/uL    Hemoglobin 14.4 11.9 - 15.1 g/dL    Hematocrit 44.1 36.3 - 47.1 %    MCV 96.9 82.6 - 102.9 fL    MCH 31.6 25.2 - 33.5 pg    MCHC 32.7 25.2 - 33.5 g/dL    RDW 11.9 11.8 - 14.4 %    Platelets 093 668 - 739 k/uL    MPV 9.8 8.1 - 13.5 fL    NRBC Automated 0.0 0.0 per 100 WBC    Differential Type NOT REPORTED     Seg Neutrophils 54 36 - 65 %    Lymphocytes 37 24 - 43 %    Monocytes 6 3 - 12 %    Eosinophils % 2 1 - 4 %    Basophils 1 0 - 2 %    Immature Granulocytes 0 0 %    Segs Absolute 2.77 1.50 - 8.10 k/uL    Absolute Lymph # 1.91 1.10 - 3.70 k/uL    Absolute Mono # 0.30 0.10 - 1.20 k/uL    Absolute Eos # 0.10 0.00 - 0.44 k/uL    Basophils Absolute 0.03 0.00 - 0.20 k/uL    Absolute Immature Granulocyte <0.03 0.00 - 0.30 k/uL    WBC Morphology NOT REPORTED     RBC Morphology NOT REPORTED     Platelet Estimate NOT REPORTED       Other review of systems are as noted below. Preventative measures are reviewed today. See health maintenance section for complete preventative plan of care. Did review patient's med list, allergies, social history, fam history, pmhx and pshx today as noted in the record. Review of Systems   Constitutional: Negative for chills, fatigue and fever. HENT: Negative for congestion, ear pain, postnasal drip, rhinorrhea, sinus pressure, sore throat and trouble swallowing. Eyes: Negative for discharge and redness. Respiratory: Negative for cough, shortness of breath and wheezing. Cardiovascular: Negative for chest pain.    Gastrointestinal: Negative for abdominal pain, constipation, diarrhea, nausea and vomiting. Genitourinary: Negative for dysuria, flank pain, frequency and urgency. Musculoskeletal: Negative for arthralgias, myalgias and neck pain. Skin: Negative for rash and wound. Allergic/Immunologic: Negative for environmental allergies. Neurological: Negative for dizziness, weakness, light-headedness and headaches. Hematological: Negative for adenopathy. Psychiatric/Behavioral: Negative. Prior to Visit Medications    Medication Sig Taking? Authorizing Provider   calcium carbonate 600 MG TABS tablet Take 1 tablet by mouth daily  Historical Provider, MD   levothyroxine (SYNTHROID) 112 MCG tablet Take 1 tablet by mouth daily  Dayanara Brambila DO   clopidogrel (PLAVIX) 75 MG tablet take 1 tablet by mouth once daily  Dayanara Brambila DO   atorvastatin (LIPITOR) 40 MG tablet take 1 tablet by mouth at bedtime  Dayanara Brambila DO   Probiotic Product (PROBIOTIC DAILY PO) Take by mouth  Historical Provider, MD   aspirin 81 MG tablet Take 81 mg by mouth daily  Historical Provider, MD        Social History     Tobacco Use    Smoking status: Former Smoker     Packs/day: 2.00     Years: 25.00     Pack years: 50.00     Types: Cigarettes     Start date: 1973     Quit date: 2001     Years since quittin.1    Smokeless tobacco: Never Used    Tobacco comment: quit in    Substance Use Topics    Alcohol use: Yes     Comment: socia, glass of wine at night        There were no vitals filed for this visit. Estimated body mass index is 25.4 kg/m² as calculated from the following:    Height as of 2/10/21: 5' 4\" (1.626 m). Weight as of 2/10/21: 148 lb (67.1 kg). Physical Exam  Vitals and nursing note reviewed. Constitutional:       General: She is not in acute distress. Appearance: Normal appearance. She is well-developed. She is not diaphoretic. HENT:      Head: Normocephalic and atraumatic.       Right Ear: Tympanic membrane, ear canal and external ear normal.      Left Ear: Tympanic membrane, ear canal and external ear normal.      Nose: Nose normal.      Mouth/Throat:      Mouth: Mucous membranes are moist.      Pharynx: Oropharynx is clear. No oropharyngeal exudate. Eyes:      General:         Right eye: No discharge. Left eye: No discharge. Conjunctiva/sclera: Conjunctivae normal.      Pupils: Pupils are equal, round, and reactive to light. Neck:      Thyroid: No thyromegaly. Cardiovascular:      Rate and Rhythm: Normal rate and regular rhythm. Heart sounds: Normal heart sounds. Pulmonary:      Effort: Pulmonary effort is normal.      Breath sounds: Normal breath sounds. No wheezing or rales. Abdominal:      General: Bowel sounds are normal. There is no distension. Palpations: Abdomen is soft. Tenderness: There is no abdominal tenderness. Musculoskeletal:      Cervical back: Normal range of motion and neck supple. Lymphadenopathy:      Cervical: No cervical adenopathy. Skin:     General: Skin is warm and dry. Findings: No rash. Neurological:      Mental Status: She is alert and oriented to person, place, and time. Psychiatric:         Behavior: Behavior normal.         Thought Content: Thought content normal.         Judgment: Judgment normal.           ASSESSMENT/PLAN:  Encounter Diagnoses   Name Primary?     Impaired fasting glucose Yes    Mixed hyperlipidemia     Acquired hypothyroidism     Cerebrovascular accident (CVA) due to thrombosis of left vertebral artery ([facility] [address]     Need for shingles vaccine      Orders Placed This Encounter   Medications    levothyroxine (SYNTHROID) 112 MCG tablet     Sig: Take 1 tablet by mouth daily     Dispense:  90 tablet     Refill:  1    clopidogrel (PLAVIX) 75 MG tablet     Sig: take 1 tablet by mouth once daily     Dispense:  90 tablet     Refill:  1    atorvastatin (LIPITOR) 40 MG tablet     Sig: take 1 tablet by mouth at bedtime     Dispense:  90 tablet Refill:  1    zoster recombinant adjuvanted vaccine (SHINGRIX) 50 MCG/0.5ML SUSR injection     Sig: Inject 0.5 mLs into the muscle See Admin Instructions 1 dose now and repeat in 2-6 months     Dispense:  0.5 mL     Refill:  1     Orders Placed This Encounter   Procedures    CBC Auto Differential     Standing Status:   Future     Standing Expiration Date:   8/3/2022    Comprehensive Metabolic Panel     Standing Status:   Future     Standing Expiration Date:   8/3/2022    Hemoglobin A1C     Standing Status:   Future     Standing Expiration Date:   8/3/2022    Lipid Panel     Standing Status:   Future     Standing Expiration Date:   8/3/2022     Order Specific Question:   Is Patient Fasting?/# of Hours     Answer:   12 hours    TSH without Reflex     Standing Status:   Future     Standing Expiration Date:   8/3/2022    T4, Free     Standing Status:   Future     Standing Expiration Date:   8/3/2022     Patient is to continue on her current medical therapy. No changes are made at this time. Patient is to continue to follow a low-carb/low sugar/low-fat diet and increase exercise for optimal blood sugar and cholesterol control. Patient is to return to my office in 6 months duration or sooner if any further problems or symptoms arise. (Please note that portions of this note were completed with a voice recognition program. Efforts were made to edit the dictations but occasionally words are mis-transcribed.)        No follow-ups on file. An electronic signature was used to authenticate this note.     --Tommy Loge, DO on 8/3/2021 at 7:29 AM

## 2021-09-01 ENCOUNTER — OFFICE VISIT (OUTPATIENT)
Dept: CARDIOLOGY | Age: 66
End: 2021-09-01
Payer: MEDICARE

## 2021-09-01 VITALS
BODY MASS INDEX: 25.27 KG/M2 | DIASTOLIC BLOOD PRESSURE: 65 MMHG | WEIGHT: 148 LBS | HEART RATE: 83 BPM | HEIGHT: 64 IN | SYSTOLIC BLOOD PRESSURE: 141 MMHG

## 2021-09-01 DIAGNOSIS — R06.09 DYSPNEA ON EXERTION: ICD-10-CM

## 2021-09-01 DIAGNOSIS — I63.9 CEREBROVASCULAR ACCIDENT (CVA), UNSPECIFIED MECHANISM (HCC): ICD-10-CM

## 2021-09-01 DIAGNOSIS — E78.5 HYPERLIPIDEMIA, UNSPECIFIED HYPERLIPIDEMIA TYPE: Primary | ICD-10-CM

## 2021-09-01 PROCEDURE — 1036F TOBACCO NON-USER: CPT | Performed by: INTERNAL MEDICINE

## 2021-09-01 PROCEDURE — 3017F COLORECTAL CA SCREEN DOC REV: CPT | Performed by: INTERNAL MEDICINE

## 2021-09-01 PROCEDURE — 99213 OFFICE O/P EST LOW 20 MIN: CPT | Performed by: INTERNAL MEDICINE

## 2021-09-01 PROCEDURE — 1090F PRES/ABSN URINE INCON ASSESS: CPT | Performed by: INTERNAL MEDICINE

## 2021-09-01 PROCEDURE — 93010 ELECTROCARDIOGRAM REPORT: CPT | Performed by: INTERNAL MEDICINE

## 2021-09-01 PROCEDURE — 1123F ACP DISCUSS/DSCN MKR DOCD: CPT | Performed by: INTERNAL MEDICINE

## 2021-09-01 PROCEDURE — 99214 OFFICE O/P EST MOD 30 MIN: CPT | Performed by: INTERNAL MEDICINE

## 2021-09-01 PROCEDURE — G8400 PT W/DXA NO RESULTS DOC: HCPCS | Performed by: INTERNAL MEDICINE

## 2021-09-01 PROCEDURE — 4040F PNEUMOC VAC/ADMIN/RCVD: CPT | Performed by: INTERNAL MEDICINE

## 2021-09-01 PROCEDURE — G8417 CALC BMI ABV UP PARAM F/U: HCPCS | Performed by: INTERNAL MEDICINE

## 2021-09-01 PROCEDURE — G8427 DOCREV CUR MEDS BY ELIG CLIN: HCPCS | Performed by: INTERNAL MEDICINE

## 2021-09-01 PROCEDURE — 93005 ELECTROCARDIOGRAM TRACING: CPT | Performed by: INTERNAL MEDICINE

## 2021-09-01 NOTE — PROGRESS NOTES
Today's Date: 9/1/2021  Patient's Name: Zofia Barriga  Patient's age: 74 y.o., 1955    Subjective:  Zofia Barriga is being seen in clinic today regarding   Chief Complaint   Patient presents with    Hyperlipidemia   CVA      she is here for follow up. She reports doing well. She denies any chest pain. She report mild dyspnea on exertion which is stable. No PND, no syncope or pre-syncope, no orthopnea. She denies any falls or bleeding. Past Medical History:   has a past medical history of Cerebrovascular accident (CVA) due to occlusion of left vertebral artery ([address] Corneal neovascularization, Elevated fasting blood sugar, Hyperlipidemia, Hypothyroidism, IFG (impaired fasting glucose), Knee pain, Myopia with astigmatism, Neck pain, and Osteoarthritis. Past Surgical History:   has a past surgical history that includes Colonoscopy (05/2008); Insertable Cardiac Monitor (06/12/2017); Tonsillectomy; and pr colonoscopy flx dx w/collj spec when pfrmd (N/A, 7/11/2018). Home Medications:  Prior to Admission medications    Medication Sig Start Date End Date Taking?  Authorizing Provider   levothyroxine (SYNTHROID) 112 MCG tablet Take 1 tablet by mouth daily 8/3/21  Yes Cyndi Narayanan, DO   clopidogrel (PLAVIX) 75 MG tablet take 1 tablet by mouth once daily 8/3/21  Yes Cyndi Narayanan, DO   atorvastatin (LIPITOR) 40 MG tablet take 1 tablet by mouth at bedtime 8/3/21  Yes Cyndi Narayanan, DO   zoster recombinant adjuvanted vaccine [facility]) 50 MCG/0.5ML SUSR injection Inject 0.5 mLs into the muscle See Admin Instructions 1 dose now and repeat in 2-6 months 8/3/21 1/30/22 Yes Cyndi Narayanan, DO   calcium carbonate 600 MG TABS tablet Take 1 tablet by mouth daily   Yes Historical Provider, MD   Probiotic Product (PROBIOTIC DAILY PO) Take by mouth   Yes Historical Provider, MD   aspirin 81 MG tablet Take 81 mg by mouth daily   Yes Historical Provider, MD       Allergies:  Patient has no known without use of accessory muscles  · Resp Auscultation: Good respiratory effort. No for increased work of breathing. On auscultation: clear to auscultation bilaterally  Cardiovascular:  · Heart tones are crisp and normal. regular S1 and S2.  · Jugular venous pulsation Normal  · Left bruit  Abdomen:   · soft  · Bowel sounds present  Extremities:  ·  No edema  Neurological:  · Alert and oriented. Cardiac Data:  EKG: SR, NS T wave abnormality    Labs:     CBC: No results for input(s): WBC, HGB, HCT, PLT in the last 72 hours. BMP: No results for input(s): NA, K, CO2, BUN, CREATININE, LABGLOM, GLUCOSE in the last 72 hours. PT/INR: No results for input(s): PROTIME, INR in the last 72 hours. FASTING LIPID PANEL:  Lab Results   Component Value Date    HDL 52 07/30/2021    TRIG 124 07/30/2021     LIVER PROFILE:No results for input(s): AST, ALT, LABALBU in the last 72 hours. Problem List:  Patient Active Problem List   Diagnosis    Hypothyroidism    Impaired fasting glucose    Mixed hyperlipidemia    Gait abnormality    Cerebrovascular accident (CVA) due to thrombosis of left vertebral artery ([facility] [address]        TTE 01/14/2020  Summary  Normal left ventricular diameter. Left ventricular systolic function is normal.  Left ventricular ejection fraction 69 %. Grade II (moderate) left ventricular diastolic dysfunction. Left atrium is mildly dilated. Aortic leaflet calcification with mild stenosis. Peak instantaneous gradient 21 mmHg and mean gradient 12 mmHg. Mild aortic insufficiency. Normal mitral valve leaflets. Mild mitral regurgitation. Normal tricuspid valve leaflets. Mild tricuspid regurgitation. Estimated right ventricular systolic pressure is 38 mmHg. No significant pericardial effusion is seen.        TTE 01/26/2021     Summary  Normal left ventricular diameter. Left ventricular systolic function is normal.  Left ventricular ejection fraction 55 %.   Grade I (mild) left ventricular diastolic

## 2021-09-24 ENCOUNTER — PROCEDURE VISIT (OUTPATIENT)
Dept: CARDIOLOGY | Age: 66
End: 2021-09-24
Payer: MEDICARE

## 2021-09-24 DIAGNOSIS — Z95.818 STATUS POST PLACEMENT OF IMPLANTABLE LOOP RECORDER: ICD-10-CM

## 2021-09-24 DIAGNOSIS — I63.012 CEREBROVASCULAR ACCIDENT (CVA) DUE TO THROMBOSIS OF LEFT VERTEBRAL ARTERY (HCC): ICD-10-CM

## 2021-09-24 PROCEDURE — 93291 INTERROG DEV EVAL SCRMS IP: CPT | Performed by: INTERNAL MEDICINE

## 2022-01-31 ENCOUNTER — HOSPITAL ENCOUNTER (OUTPATIENT)
Dept: LAB | Age: 67
Discharge: HOME | End: 2022-01-31
Payer: MEDICARE

## 2022-01-31 DIAGNOSIS — E03.9 ACQUIRED HYPOTHYROIDISM: ICD-10-CM

## 2022-01-31 DIAGNOSIS — R73.01 IMPAIRED FASTING GLUCOSE: ICD-10-CM

## 2022-01-31 DIAGNOSIS — E78.2 MIXED HYPERLIPIDEMIA: ICD-10-CM

## 2022-01-31 LAB
ABSOLUTE EOS #: 0.15 K/UL (ref 0–0.44)
ABSOLUTE IMMATURE GRANULOCYTE: <0.03 K/UL (ref 0–0.3)
ABSOLUTE LYMPH #: 1.73 K/UL (ref 1.1–3.7)
ABSOLUTE MONO #: 0.35 K/UL (ref 0.1–1.2)
ALBUMIN SERPL-MCNC: 4.6 G/DL (ref 3.5–5.2)
ALBUMIN/GLOBULIN RATIO: 1.8 (ref 1–2.5)
ALP BLD-CCNC: 115 U/L (ref 35–104)
ALT SERPL-CCNC: 22 U/L (ref 5–33)
ANION GAP SERPL CALCULATED.3IONS-SCNC: 9 MMOL/L (ref 9–17)
AST SERPL-CCNC: 17 U/L
BASOPHILS # BLD: 1 % (ref 0–2)
BASOPHILS ABSOLUTE: 0.03 K/UL (ref 0–0.2)
BILIRUB SERPL-MCNC: 0.78 MG/DL (ref 0.3–1.2)
BUN BLDV-MCNC: 12 MG/DL (ref 8–23)
BUN/CREAT BLD: 17 (ref 9–20)
CALCIUM SERPL-MCNC: 10.1 MG/DL (ref 8.6–10.4)
CHLORIDE BLD-SCNC: 105 MMOL/L (ref 98–107)
CO2: 29 MMOL/L (ref 20–31)
CREAT SERPL-MCNC: 0.72 MG/DL (ref 0.5–0.9)
DIFFERENTIAL TYPE: NORMAL
EOSINOPHILS RELATIVE PERCENT: 4 % (ref 1–4)
ESTIMATED AVERAGE GLUCOSE: 111 MG/DL
GFR AFRICAN AMERICAN: >60 ML/MIN
GFR NON-AFRICAN AMERICAN: >60 ML/MIN
GFR SERPL CREATININE-BSD FRML MDRD: ABNORMAL ML/MIN/{1.73_M2}
GFR SERPL CREATININE-BSD FRML MDRD: ABNORMAL ML/MIN/{1.73_M2}
GLUCOSE BLD-MCNC: 103 MG/DL (ref 70–99)
HBA1C MFR BLD: 5.5 % (ref 4–6)
HCT VFR BLD CALC: 42.2 % (ref 36.3–47.1)
HEMOGLOBIN: 14.1 G/DL (ref 11.9–15.1)
IMMATURE GRANULOCYTES: 0 %
LYMPHOCYTES # BLD: 42 % (ref 24–43)
MCH RBC QN AUTO: 32.2 PG (ref 25.2–33.5)
MCHC RBC AUTO-ENTMCNC: 33.4 G/DL (ref 25.2–33.5)
MCV RBC AUTO: 96.3 FL (ref 82.6–102.9)
MONOCYTES # BLD: 9 % (ref 3–12)
NRBC AUTOMATED: 0 PER 100 WBC
PDW BLD-RTO: 12.1 % (ref 11.8–14.4)
PLATELET # BLD: 195 K/UL (ref 138–453)
PLATELET ESTIMATE: NORMAL
PMV BLD AUTO: 9.8 FL (ref 8.1–13.5)
POTASSIUM SERPL-SCNC: 4.4 MMOL/L (ref 3.7–5.3)
RBC # BLD: 4.38 M/UL (ref 3.95–5.11)
RBC # BLD: NORMAL 10*6/UL
SEG NEUTROPHILS: 44 % (ref 36–65)
SEGMENTED NEUTROPHILS ABSOLUTE COUNT: 1.82 K/UL (ref 1.5–8.1)
SODIUM BLD-SCNC: 143 MMOL/L (ref 135–144)
TOTAL PROTEIN: 7.1 G/DL (ref 6.4–8.3)
TSH SERPL DL<=0.05 MIU/L-ACNC: 1.49 MIU/L (ref 0.3–5)
WBC # BLD: 4.1 K/UL (ref 3.5–11.3)
WBC # BLD: NORMAL 10*3/UL

## 2022-01-31 PROCEDURE — 85025 COMPLETE CBC W/AUTO DIFF WBC: CPT

## 2022-01-31 PROCEDURE — 80053 COMPREHEN METABOLIC PANEL: CPT

## 2022-01-31 PROCEDURE — 80061 LIPID PANEL: CPT

## 2022-01-31 PROCEDURE — 84439 ASSAY OF FREE THYROXINE: CPT

## 2022-01-31 PROCEDURE — 84443 ASSAY THYROID STIM HORMONE: CPT

## 2022-01-31 PROCEDURE — 36415 COLL VENOUS BLD VENIPUNCTURE: CPT

## 2022-01-31 PROCEDURE — 83036 HEMOGLOBIN GLYCOSYLATED A1C: CPT

## 2022-02-01 LAB
CHOLESTEROL/HDL RATIO: 2.5
CHOLESTEROL: 142 MG/DL
HDLC SERPL-MCNC: 56 MG/DL
LDL CHOLESTEROL: 56 MG/DL (ref 0–130)
THYROXINE, FREE: 1.21 NG/DL (ref 0.93–1.7)
TRIGL SERPL-MCNC: 150 MG/DL
VLDLC SERPL CALC-MCNC: ABNORMAL MG/DL (ref 1–30)

## 2022-02-09 ENCOUNTER — OFFICE VISIT (OUTPATIENT)
Dept: FAMILY MEDICINE CLINIC | Age: 67
End: 2022-02-09
Payer: MEDICARE

## 2022-02-09 VITALS
HEIGHT: 64 IN | BODY MASS INDEX: 25.27 KG/M2 | SYSTOLIC BLOOD PRESSURE: 124 MMHG | HEART RATE: 86 BPM | OXYGEN SATURATION: 97 % | TEMPERATURE: 98.2 F | DIASTOLIC BLOOD PRESSURE: 78 MMHG | WEIGHT: 148 LBS

## 2022-02-09 DIAGNOSIS — E78.2 MIXED HYPERLIPIDEMIA: ICD-10-CM

## 2022-02-09 DIAGNOSIS — E03.9 ACQUIRED HYPOTHYROIDISM: ICD-10-CM

## 2022-02-09 DIAGNOSIS — I63.012 CEREBROVASCULAR ACCIDENT (CVA) DUE TO THROMBOSIS OF LEFT VERTEBRAL ARTERY (HCC): ICD-10-CM

## 2022-02-09 DIAGNOSIS — R73.01 IMPAIRED FASTING GLUCOSE: Primary | ICD-10-CM

## 2022-02-09 PROCEDURE — 1036F TOBACCO NON-USER: CPT | Performed by: FAMILY MEDICINE

## 2022-02-09 PROCEDURE — 3017F COLORECTAL CA SCREEN DOC REV: CPT | Performed by: FAMILY MEDICINE

## 2022-02-09 PROCEDURE — 1090F PRES/ABSN URINE INCON ASSESS: CPT | Performed by: FAMILY MEDICINE

## 2022-02-09 PROCEDURE — 99212 OFFICE O/P EST SF 10 MIN: CPT | Performed by: FAMILY MEDICINE

## 2022-02-09 PROCEDURE — G8427 DOCREV CUR MEDS BY ELIG CLIN: HCPCS | Performed by: FAMILY MEDICINE

## 2022-02-09 PROCEDURE — G8400 PT W/DXA NO RESULTS DOC: HCPCS | Performed by: FAMILY MEDICINE

## 2022-02-09 PROCEDURE — 99214 OFFICE O/P EST MOD 30 MIN: CPT | Performed by: FAMILY MEDICINE

## 2022-02-09 PROCEDURE — G8417 CALC BMI ABV UP PARAM F/U: HCPCS | Performed by: FAMILY MEDICINE

## 2022-02-09 PROCEDURE — 1123F ACP DISCUSS/DSCN MKR DOCD: CPT | Performed by: FAMILY MEDICINE

## 2022-02-09 PROCEDURE — 4040F PNEUMOC VAC/ADMIN/RCVD: CPT | Performed by: FAMILY MEDICINE

## 2022-02-09 PROCEDURE — G8484 FLU IMMUNIZE NO ADMIN: HCPCS | Performed by: FAMILY MEDICINE

## 2022-02-09 RX ORDER — LEVOTHYROXINE SODIUM 112 UG/1
112 TABLET ORAL DAILY
Qty: 90 TABLET | Refills: 1 | Status: SHIPPED | OUTPATIENT
Start: 2022-02-09 | End: 2022-09-08

## 2022-02-09 RX ORDER — CLOPIDOGREL BISULFATE 75 MG/1
TABLET ORAL
Qty: 90 TABLET | Refills: 1 | Status: SHIPPED | OUTPATIENT
Start: 2022-02-09 | End: 2022-09-08

## 2022-02-09 RX ORDER — ATORVASTATIN CALCIUM 40 MG/1
TABLET, FILM COATED ORAL
Qty: 90 TABLET | Refills: 1 | Status: SHIPPED | OUTPATIENT
Start: 2022-02-09

## 2022-02-09 SDOH — ECONOMIC STABILITY: FOOD INSECURITY: WITHIN THE PAST 12 MONTHS, THE FOOD YOU BOUGHT JUST DIDN'T LAST AND YOU DIDN'T HAVE MONEY TO GET MORE.: NEVER TRUE

## 2022-02-09 SDOH — ECONOMIC STABILITY: FOOD INSECURITY: WITHIN THE PAST 12 MONTHS, YOU WORRIED THAT YOUR FOOD WOULD RUN OUT BEFORE YOU GOT MONEY TO BUY MORE.: NEVER TRUE

## 2022-02-09 ASSESSMENT — PATIENT HEALTH QUESTIONNAIRE - PHQ9
SUM OF ALL RESPONSES TO PHQ QUESTIONS 1-9: 0
2. FEELING DOWN, DEPRESSED OR HOPELESS: 0
SUM OF ALL RESPONSES TO PHQ QUESTIONS 1-9: 0
SUM OF ALL RESPONSES TO PHQ QUESTIONS 1-9: 0
SUM OF ALL RESPONSES TO PHQ9 QUESTIONS 1 & 2: 0
SUM OF ALL RESPONSES TO PHQ QUESTIONS 1-9: 0
1. LITTLE INTEREST OR PLEASURE IN DOING THINGS: 0

## 2022-02-09 ASSESSMENT — ENCOUNTER SYMPTOMS
NAUSEA: 0
COUGH: 0
SINUS PRESSURE: 0
SHORTNESS OF BREATH: 0
SORE THROAT: 0
EYE REDNESS: 0
VOMITING: 0
DIARRHEA: 0
ABDOMINAL PAIN: 0
TROUBLE SWALLOWING: 0
WHEEZING: 0
RHINORRHEA: 0
CONSTIPATION: 0
EYE DISCHARGE: 0

## 2022-02-09 ASSESSMENT — SOCIAL DETERMINANTS OF HEALTH (SDOH): HOW HARD IS IT FOR YOU TO PAY FOR THE VERY BASICS LIKE FOOD, HOUSING, MEDICAL CARE, AND HEATING?: NOT HARD AT ALL

## 2022-02-09 NOTE — PATIENT INSTRUCTIONS
Hospital Outpatient Visit on 01/31/2022   Component Date Value Ref Range Status    Thyroxine, Free 01/31/2022 1.21  0.93 - 1.70 ng/dL Final    TSH 01/31/2022 1.49  0.30 - 5.00 mIU/L Final    Cholesterol 01/31/2022 142  <200 mg/dL Final    Comment:    Cholesterol Guidelines:      <200  Desirable   200-240  Borderline      >240  Undesirable         HDL 01/31/2022 56  >40 mg/dL Final    Comment:    HDL Guidelines:    <40     Undesirable   40-59    Borderline    >59     Desirable         LDL Cholesterol 01/31/2022 56  0 - 130 mg/dL Final    Comment:    LDL Guidelines:     <100    Desirable   100-129   Near to/above Desirable   130-159   Borderline      >159   Undesirable     Direct (measured) LDL and calculated LDL are not interchangeable tests.  Chol/HDL Ratio 01/31/2022 2.5  <5 Final            Triglycerides 01/31/2022 150* <150 mg/dL Final    Comment:    Triglyceride Guidelines:     <150   Desirable   150-199  Borderline   200-499  High     >499   Very high   Based on AHA Guidelines for fasting triglyceride, October 2012.  VLDL 01/31/2022 NOT REPORTED  1 - 30 mg/dL Final    Hemoglobin A1C 01/31/2022 5.5  4.0 - 6.0 % Final    Estimated Avg Glucose 01/31/2022 111  mg/dL Final    Comment: The ADA and AACC recommend providing the estimated average glucose result to permit better   patient understanding of their HBA1c result.       Glucose 01/31/2022 103* 70 - 99 mg/dL Final    BUN 01/31/2022 12  8 - 23 mg/dL Final    CREATININE 01/31/2022 0.72  0.50 - 0.90 mg/dL Final    Bun/Cre Ratio 01/31/2022 17  9 - 20 Final    Calcium 01/31/2022 10.1  8.6 - 10.4 mg/dL Final    Sodium 01/31/2022 143  135 - 144 mmol/L Final    Potassium 01/31/2022 4.4  3.7 - 5.3 mmol/L Final    Chloride 01/31/2022 105  98 - 107 mmol/L Final    CO2 01/31/2022 29  20 - 31 mmol/L Final    Anion Gap 01/31/2022 9  9 - 17 mmol/L Final    Alkaline Phosphatase 01/31/2022 115* 35 - 104 U/L Final    ALT 01/31/2022 22  5 - 33 U/L Final    AST 01/31/2022 17  <32 U/L Final    Total Bilirubin 01/31/2022 0.78  0.3 - 1.2 mg/dL Final    Total Protein 01/31/2022 7.1  6.4 - 8.3 g/dL Final    Albumin 01/31/2022 4.6  3.5 - 5.2 g/dL Final    Albumin/Globulin Ratio 01/31/2022 1.8  1.0 - 2.5 Final    GFR Non- 01/31/2022 >60  >60 mL/min Final    GFR  01/31/2022 >60  >60 mL/min Final    GFR Comment 01/31/2022        Final    Comment: Average GFR for 61-76 years old:   80 mL/min/1.73sq m  Chronic Kidney Disease:   <60 mL/min/1.73sq m  Kidney failure:   <15 mL/min/1.73sq m              eGFR calculated using average adult body mass.  Additional eGFR calculator available at:        [email or web address]            GFR Staging 01/31/2022 NOT REPORTED   Final    WBC 01/31/2022 4.1  3.5 - 11.3 k/uL Final    RBC 01/31/2022 4.38  3.95 - 5.11 m/uL Final    Hemoglobin 01/31/2022 14.1  11.9 - 15.1 g/dL Final    Hematocrit 01/31/2022 42.2  36.3 - 47.1 % Final    MCV 01/31/2022 96.3  82.6 - 102.9 fL Final    MCH 01/31/2022 32.2  25.2 - 33.5 pg Final    MCHC 01/31/2022 33.4  25.2 - 33.5 g/dL Final    RDW 01/31/2022 12.1  11.8 - 14.4 % Final    Platelets 29/71/6537 195  138 - 453 k/uL Final    MPV 01/31/2022 9.8  8.1 - 13.5 fL Final    NRBC Automated 01/31/2022 0.0  0.0 per 100 WBC Final    Differential Type 01/31/2022 NOT REPORTED   Final    Seg Neutrophils 01/31/2022 44  36 - 65 % Final    Lymphocytes 01/31/2022 42  24 - 43 % Final    Monocytes 01/31/2022 9  3 - 12 % Final    Eosinophils % 01/31/2022 4  1 - 4 % Final    Basophils 01/31/2022 1  0 - 2 % Final    Immature Granulocytes 01/31/2022 0  0 % Final    Segs Absolute 01/31/2022 1.82  1.50 - 8.10 k/uL Final    Absolute Lymph # 01/31/2022 1.73  1.10 - 3.70 k/uL Final    Absolute Mono # 01/31/2022 0.35  0.10 - 1.20 k/uL Final    Absolute Eos # 01/31/2022 0.15  0.00 - 0.44 k/uL Final    Basophils Absolute 01/31/2022 0.03  0.00 - 0.20 k/uL Final    Absolute Immature Granulocyte 01/31/2022 <0.03  0.00 - 0.30 k/uL Final    WBC Morphology 01/31/2022 NOT REPORTED   Final    RBC Morphology 01/31/2022 NOT REPORTED   Final    Platelet Estimate 38/04/2372 NOT REPORTED   Final

## 2022-02-09 NOTE — PROGRESS NOTES
2022     Faith Fletcher (:  1955) is a 74 y.o. female, here for evaluation of the following medical concerns:    HPI  Patient comes in today for follow up of chronic health issues Patient seems to be doing relatively well overall. Does state that intermittently she has noticed more issues with getting very easily irritable or anxious at times. Does not seem to be overwhelming but notices that certain things seem to bother her more than they used to. We did talk about medical therapy to help with anxiety or mood fluctuation at this point she did not feel that was bad enough to add medical therapy but if she would like to do this in the future she can let me know. Does have a known history of impaired fasting glucose her blood sugar levels are stable and controlled with her current dietary regimen. Does have known hyperlipidemia and her cholesterol levels have improved since last check. She been doing more routine exercise encouraged her to continue with her exercise regimen. Does have known hypothyroidism and her thyroid levels are stable and adequately supplemented on her current thyroid dose. Has known history of previous CVA and continues on Plavix without difficulty. Patient otherwise has no other acute medical concerns to discuss. .  Patient's recent lab reports are as follows:    Results for orders placed or performed during the hospital encounter of 22   T4, Free   Result Value Ref Range    Thyroxine, Free 1.21 0.93 - 1.70 ng/dL   TSH without Reflex   Result Value Ref Range    TSH 1.49 0.30 - 5.00 mIU/L   Lipid Panel   Result Value Ref Range    Cholesterol 142 <200 mg/dL    HDL 56 >40 mg/dL    LDL Cholesterol 56 0 - 130 mg/dL    Chol/HDL Ratio 2.5 <5    Triglycerides 150 (H) <150 mg/dL    VLDL NOT REPORTED 1 - 30 mg/dL   Hemoglobin A1C   Result Value Ref Range    Hemoglobin A1C 5.5 4.0 - 6.0 %    Estimated Avg Glucose 111 mg/dL   Comprehensive Metabolic Panel   Result Value Ref Range    Glucose 103 (H) 70 - 99 mg/dL    BUN 12 8 - 23 mg/dL    CREATININE 0.72 0.50 - 0.90 mg/dL    Bun/Cre Ratio 17 9 - 20    Calcium 10.1 8.6 - 10.4 mg/dL    Sodium 143 135 - 144 mmol/L    Potassium 4.4 3.7 - 5.3 mmol/L    Chloride 105 98 - 107 mmol/L    CO2 29 20 - 31 mmol/L    Anion Gap 9 9 - 17 mmol/L    Alkaline Phosphatase 115 (H) 35 - 104 U/L    ALT 22 5 - 33 U/L    AST 17 <32 U/L    Total Bilirubin 0.78 0.3 - 1.2 mg/dL    Total Protein 7.1 6.4 - 8.3 g/dL    Albumin 4.6 3.5 - 5.2 g/dL    Albumin/Globulin Ratio 1.8 1.0 - 2.5    GFR Non-African American >60 >60 mL/min    GFR African American >60 >60 mL/min    GFR Comment          GFR Staging NOT REPORTED    CBC Auto Differential   Result Value Ref Range    WBC 4.1 3.5 - 11.3 k/uL    RBC 4.38 3.95 - 5.11 m/uL    Hemoglobin 14.1 11.9 - 15.1 g/dL    Hematocrit 42.2 36.3 - 47.1 %    MCV 96.3 82.6 - 102.9 fL    MCH 32.2 25.2 - 33.5 pg    MCHC 33.4 25.2 - 33.5 g/dL    RDW 12.1 11.8 - 14.4 %    Platelets 397 364 - 729 k/uL    MPV 9.8 8.1 - 13.5 fL    NRBC Automated 0.0 0.0 per 100 WBC    Differential Type NOT REPORTED     Seg Neutrophils 44 36 - 65 %    Lymphocytes 42 24 - 43 %    Monocytes 9 3 - 12 %    Eosinophils % 4 1 - 4 %    Basophils 1 0 - 2 %    Immature Granulocytes 0 0 %    Segs Absolute 1.82 1.50 - 8.10 k/uL    Absolute Lymph # 1.73 1.10 - 3.70 k/uL    Absolute Mono # 0.35 0.10 - 1.20 k/uL    Absolute Eos # 0.15 0.00 - 0.44 k/uL    Basophils Absolute 0.03 0.00 - 0.20 k/uL    Absolute Immature Granulocyte <0.03 0.00 - 0.30 k/uL    WBC Morphology NOT REPORTED     RBC Morphology NOT REPORTED     Platelet Estimate NOT REPORTED       Other review of systems are as noted below. Preventative measures are reviewed today. See health maintenance section for complete preventative plan of care. Did review patient's med list, allergies, social history, fam history, pmhx and pshx today as noted in the record.       Review of Systems   Constitutional: Negative for chills, fatigue and fever. HENT: Negative for congestion, ear pain, postnasal drip, rhinorrhea, sinus pressure, sore throat and trouble swallowing. Eyes: Negative for discharge and redness. Respiratory: Negative for cough, shortness of breath and wheezing. Cardiovascular: Negative for chest pain. Gastrointestinal: Negative for abdominal pain, constipation, diarrhea, nausea and vomiting. Genitourinary: Negative for dysuria, flank pain, frequency and urgency. Musculoskeletal: Negative for arthralgias, myalgias and neck pain. Skin: Negative for rash and wound. Allergic/Immunologic: Negative for environmental allergies. Neurological: Negative for dizziness, weakness, light-headedness and headaches. Hematological: Negative for adenopathy. Psychiatric/Behavioral: Negative. Prior to Visit Medications    Medication Sig Taking? Authorizing Provider   levothyroxine (SYNTHROID) 112 MCG tablet Take 1 tablet by mouth daily  Wil Hall, DO   clopidogrel (PLAVIX) 75 MG tablet take 1 tablet by mouth once daily  Wil Hall, DO   atorvastatin (LIPITOR) 40 MG tablet take 1 tablet by mouth at bedtime  Wil Hall, DO   calcium carbonate 600 MG TABS tablet Take 1 tablet by mouth daily  Historical Provider, MD   Probiotic Product (PROBIOTIC DAILY PO) Take by mouth  Historical Provider, MD   aspirin 81 MG tablet Take 81 mg by mouth daily  Historical Provider, MD        Social History     Tobacco Use    Smoking status: Former Smoker     Packs/day: 2.00     Years: 25.00     Pack years: 50.00     Types: Cigarettes     Start date: 1973     Quit date: 2001     Years since quittin.6    Smokeless tobacco: Never Used    Tobacco comment: quit in    Substance Use Topics    Alcohol use: Yes     Comment: socia, glass of wine at night        There were no vitals filed for this visit.   Estimated body mass index is 25.4 kg/m² as calculated from the following:    Height as of 9/1/21: 5' 4\" (1.626 m). Weight as of 9/1/21: 148 lb (67.1 kg). Physical Exam  Vitals and nursing note reviewed. Constitutional:       General: She is not in acute distress. Appearance: Normal appearance. She is well-developed. She is not diaphoretic. HENT:      Head: Normocephalic and atraumatic. Right Ear: Tympanic membrane, ear canal and external ear normal.      Left Ear: Tympanic membrane, ear canal and external ear normal.      Nose: Nose normal.      Mouth/Throat:      Mouth: Mucous membranes are moist.      Pharynx: Oropharynx is clear. No oropharyngeal exudate. Eyes:      General:         Right eye: No discharge. Left eye: No discharge. Conjunctiva/sclera: Conjunctivae normal.      Pupils: Pupils are equal, round, and reactive to light. Neck:      Thyroid: No thyromegaly. Cardiovascular:      Rate and Rhythm: Normal rate and regular rhythm. Heart sounds: Normal heart sounds. Pulmonary:      Effort: Pulmonary effort is normal.      Breath sounds: Normal breath sounds. No wheezing or rales. Abdominal:      General: Bowel sounds are normal. There is no distension. Palpations: Abdomen is soft. Tenderness: There is no abdominal tenderness. Musculoskeletal:      Cervical back: Normal range of motion and neck supple. Lymphadenopathy:      Cervical: No cervical adenopathy. Skin:     General: Skin is warm and dry. Findings: No rash. Neurological:      Mental Status: She is alert and oriented to person, place, and time. Psychiatric:         Behavior: Behavior normal.         Thought Content: Thought content normal.         Judgment: Judgment normal.           ASSESSMENT/PLAN:  Encounter Diagnoses   Name Primary?     Impaired fasting glucose Yes    Mixed hyperlipidemia     Acquired hypothyroidism     Cerebrovascular accident (CVA) due to thrombosis of left vertebral artery ([facility] [address]      Orders Placed This Encounter   Medications    levothyroxine (SYNTHROID) 112 MCG tablet     Sig: Take 1 tablet by mouth daily     Dispense:  90 tablet     Refill:  1    clopidogrel (PLAVIX) 75 MG tablet     Sig: take 1 tablet by mouth once daily     Dispense:  90 tablet     Refill:  1    atorvastatin (LIPITOR) 40 MG tablet     Sig: take 1 tablet by mouth at bedtime     Dispense:  90 tablet     Refill:  1     Orders Placed This Encounter   Procedures    CBC Auto Differential     Standing Status:   Future     Standing Expiration Date:   2/9/2023    Comprehensive Metabolic Panel     Standing Status:   Future     Standing Expiration Date:   2/9/2023    Hemoglobin A1C     Standing Status:   Future     Standing Expiration Date:   2/9/2023    Lipid Panel     Standing Status:   Future     Standing Expiration Date:   2/9/2023     Order Specific Question:   Is Patient Fasting?/# of Hours     Answer:   12 hours    TSH without Reflex     Standing Status:   Future     Standing Expiration Date:   2/9/2023    T4, Free     Standing Status:   Future     Standing Expiration Date:   2/9/2023     Patient is to continue on her current medical therapy. No changes are made at this time. Patient is to continue to follow a low-carb/low sugar/low-fat diet and increase exercise for optimal blood sugar and cholesterol control. Patient is to return to my office in 6 months duration or sooner if any further problems or symptoms arise. (Please note that portions of this note were completed with a voice recognition program. Efforts were made to edit the dictations but occasionally words are mis-transcribed.)        No follow-ups on file. An electronic signature was used to authenticate this note.     --Wil Hall DO on 2/9/2022 at 7:28 AM

## 2022-02-15 ENCOUNTER — VIRTUAL VISIT (OUTPATIENT)
Dept: FAMILY MEDICINE CLINIC | Age: 67
End: 2022-02-15
Payer: MEDICARE

## 2022-02-15 DIAGNOSIS — Z00.00 ROUTINE GENERAL MEDICAL EXAMINATION AT A HEALTH CARE FACILITY: Primary | ICD-10-CM

## 2022-02-15 PROCEDURE — 4040F PNEUMOC VAC/ADMIN/RCVD: CPT | Performed by: FAMILY MEDICINE

## 2022-02-15 PROCEDURE — G0439 PPPS, SUBSEQ VISIT: HCPCS | Performed by: FAMILY MEDICINE

## 2022-02-15 PROCEDURE — G8484 FLU IMMUNIZE NO ADMIN: HCPCS | Performed by: FAMILY MEDICINE

## 2022-02-15 PROCEDURE — 3017F COLORECTAL CA SCREEN DOC REV: CPT | Performed by: FAMILY MEDICINE

## 2022-02-15 PROCEDURE — 1123F ACP DISCUSS/DSCN MKR DOCD: CPT | Performed by: FAMILY MEDICINE

## 2022-02-15 ASSESSMENT — PATIENT HEALTH QUESTIONNAIRE - PHQ9
SUM OF ALL RESPONSES TO PHQ QUESTIONS 1-9: 0
2. FEELING DOWN, DEPRESSED OR HOPELESS: 0
1. LITTLE INTEREST OR PLEASURE IN DOING THINGS: 0
SUM OF ALL RESPONSES TO PHQ9 QUESTIONS 1 & 2: 0

## 2022-02-15 ASSESSMENT — LIFESTYLE VARIABLES
HOW OFTEN DURING THE LAST YEAR HAVE YOU FAILED TO DO WHAT WAS NORMALLY EXPECTED FROM YOU BECAUSE OF DRINKING: 0
HOW OFTEN DURING THE LAST YEAR HAVE YOU HAD A FEELING OF GUILT OR REMORSE AFTER DRINKING: 0
HAVE YOU OR SOMEONE ELSE BEEN INJURED AS A RESULT OF YOUR DRINKING: 0
HOW OFTEN DO YOU HAVE SIX OR MORE DRINKS ON ONE OCCASION: 0
HOW OFTEN DURING THE LAST YEAR HAVE YOU BEEN UNABLE TO REMEMBER WHAT HAPPENED THE NIGHT BEFORE BECAUSE YOU HAD BEEN DRINKING: 0
HOW MANY STANDARD DRINKS CONTAINING ALCOHOL DO YOU HAVE ON A TYPICAL DAY: 0
HOW OFTEN DURING THE LAST YEAR HAVE YOU FOUND THAT YOU WERE NOT ABLE TO STOP DRINKING ONCE YOU HAD STARTED: 0
AUDIT TOTAL SCORE: 4
HOW OFTEN DURING THE LAST YEAR HAVE YOU NEEDED AN ALCOHOLIC DRINK FIRST THING IN THE MORNING TO GET YOURSELF GOING AFTER A NIGHT OF HEAVY DRINKING: 0
AUDIT-C TOTAL SCORE: 4
HAS A RELATIVE, FRIEND, DOCTOR, OR ANOTHER HEALTH PROFESSIONAL EXPRESSED CONCERN ABOUT YOUR DRINKING OR SUGGESTED YOU CUT DOWN: 0
HOW OFTEN DO YOU HAVE A DRINK CONTAINING ALCOHOL: 4

## 2022-02-15 NOTE — PROGRESS NOTES
Medicare Annual Wellness Visit  Name: Ricardo Paula Date: 2/15/2022   MRN: [id number] Sex: Female   Age: 74 y.o. Ethnicity: Non- / Non    : 1955 Race: White (non-)      Tiffanie Levy is here for Medicare AWV (subsequent; last 2021)    Screenings for behavioral, psychosocial and functional/safety risks, and cognitive dysfunction are all negative except as indicated below. These results, as well as other patient data from the [address] form, are documented in Flowsheets linked to this Encounter. No Known Allergies    Prior to Visit Medications    Medication Sig Taking? Authorizing Provider   levothyroxine (SYNTHROID) 112 MCG tablet Take 1 tablet by mouth daily Yes Wilfrid Stockton DO   clopidogrel (PLAVIX) 75 MG tablet take 1 tablet by mouth once daily Yes Wilfrid Stockton DO   atorvastatin (LIPITOR) 40 MG tablet take 1 tablet by mouth at bedtime Yes Wilfrid Stockton DO   calcium carbonate 600 MG TABS tablet Take 1 tablet by mouth daily Yes Historical Provider, MD   Probiotic Product (PROBIOTIC DAILY PO) Take by mouth Yes Historical Provider, MD   aspirin 81 MG tablet Take 81 mg by mouth daily Yes Historical Provider, MD       Past Medical History:   Diagnosis Date    Cerebrovascular accident (CVA) due to occlusion of left vertebral artery ([address] 2017    Left medulary stroke.   residual effects:  mild ataxia, left sided paresthesias    Corneal neovascularization     Elevated fasting blood sugar     Hyperlipidemia     Hypothyroidism     IFG (impaired fasting glucose)     Knee pain     Myopia with astigmatism     Neck pain     intermittent    Osteoarthritis        Past Surgical History:   Procedure Laterality Date    COLONOSCOPY  2008    no acute abnormality    INSERTABLE CARDIAC MONITOR  2017    LINQ cardiac monitor    ID COLONOSCOPY FLX DX W/COLLJ SPEC WHEN PFRMD N/A 2018    COLONOSCOPY performed by Gio Erwin Salas MD at [address]         Family History   Problem Relation Age of Onset    Cancer Father         metastatic melanoma    Thyroid Disease Mother     Heart Disease Maternal Grandmother     Stroke Maternal Grandmother     Heart Disease Maternal Grandfather     Stroke Maternal Grandfather     Heart Disease Paternal Grandmother     Stroke Paternal Grandmother     Heart Disease Paternal Grandfather     Stroke Paternal Grandfather        CareTeam (Including outside providers/suppliers regularly involved in providing care):   Patient Care Team:  Jazz Messer DO as PCP - General (Family Medicine)  Jazz Messer DO as PCP - [facility]  Sylvester Carson MD as Consulting Physician (Internal Medicine Cardiovascular Disease)    Wt Readings from Last 3 Encounters:   02/09/22 148 lb (67.1 kg)   09/01/21 148 lb (67.1 kg)   02/10/21 148 lb (67.1 kg)     Patient-Reported Vitals 2/15/2022   Patient-Reported Weight 148 lb   Patient-Reported Height 5'4\"      Based upon direct observation of the patient, evaluation of cognition reveals recent and remote memory intact. Patient's complete Health Risk Assessment and screening values have been reviewed and are found in Flowsheets. The following problems were reviewed today and where indicated follow up appointments were made and/or referrals ordered. Positive Risk Factor Screenings with Interventions:          General Health and ACP:  General  In general, how would you say your health is?: Very Good  In the past 7 days, have you experienced any of the following?  New or Increased Pain, New or Increased Fatigue, Loneliness, Social Isolation, Stress or Anger?: None of These  Do you get the social and emotional support that you need?: Yes  Do you have a Living Will?: (!) No (discussed purpose and avenues to complete a LW)  Advance Directives     Power of  Living Will ACP-Advance Directive ACP-Power of     Not on File Not on File Not on File Not on File      General Health Risk Interventions:  · No Living Will: Advance Care Planning addressed with patient today     Hearing/Vision:  No exam data present  Hearing/Vision  Do you or your family notice any trouble with your hearing that hasn't been managed with hearing aids?: No  Do you have difficulty driving, watching TV, or doing any of your daily activities because of your eyesight?: No  Have you had an eye exam within the past year?: (!) No (wears contacts; is about time to schedule again)  Hearing/Vision Interventions:  · Vision concerns:  patient encouraged to make appointment with his/her eye specialist      Personalized Preventive Plan   Current Health Maintenance Status  Immunization History   Administered Date(s) Administered    COVID-19, Pfizer Purple top, DILUTE for use, 12+ yrs, 30mcg/0.3mL dose 04/03/2021, 04/24/2021, 12/27/2021    Influenza, High Dose (Fluzone 65 yrs and older) 12/10/2018    Influenza, Quadv, IM, PF (6 mo and older Fluzone, Flulaval, Fluarix, and 3 yrs and older Afluria) 12/07/2017    Influenza, Quadv, adjuvanted, 65 yrs +, IM, PF (Fluad) 10/23/2020, 10/30/2021    Pneumococcal Conjugate 13-valent ([id number]) 12/10/2018    Pneumococcal Polysaccharide ([id number]) 01/10/2020    Tdap (Boostrix, Adacel) 06/09/2017    Zoster Recombinant (Shingrix) 01/03/2022        Health Maintenance   Topic Date Due    Annual Wellness Visit (AWV)  Completed today    Shingles Vaccine (2 of 2) 02/28/2022    Breast cancer screen  06/26/2022    A1C test (Diabetic or Prediabetic)  01/31/2023    Lipid screen  01/31/2023    TSH testing  01/31/2023    Depression Screen  02/09/2023    DTaP/Tdap/Td vaccine (2 - Td or Tdap) 06/09/2027    Colon cancer screen colonoscopy  07/11/2028    DEXA (modify frequency per FRAX score)  Completed    Flu vaccine  Completed    Pneumococcal 65+ years Vaccine  Completed    COVID-19 Vaccine  Completed    Hepatitis C screen Completed    Hepatitis A vaccine  Aged Out    Hepatitis B vaccine  Aged Out    Hib vaccine  Aged Out    Meningococcal (ACWY) vaccine  Aged Out     Recommendations for [employer] Due: see orders and patient instructions/AVS.  . Recommended screening schedule for the next 5-10 years is provided to the patient in written form: see Patient Instructions/AVS.    Akua Rodriguez RN, 2/15/2022, performed the documented evaluation under the direct supervision of the attending physician. Fany Felix, was evaluated through a synchronous (real-time) telephone encounter. The patient (or guardian if applicable) is aware that this is a billable service, which includes applicable co-pays. This Virtual Visit was conducted with patient's (and/or legal guardian's) consent. The visit was conducted pursuant to the emergency declaration under the [address] [place] [address], [address] [place] [address] authority and the [place] [employer] General Act. Patient identification was verified, and a caregiver was present when appropriate. The patient was located at home in a state where the provider was licensed to provide care. Total time spent for this encounter: Not billed by time    --Yuliya Espino RN on 2/15/2022 at 10:26 AM    An electronic signature was used to authenticate this note.

## 2022-02-15 NOTE — PATIENT INSTRUCTIONS
Personalized Preventive Plan for Rob Benitez - 2/15/2022  Medicare offers a range of preventive health benefits. Some of the tests and screenings are paid in full while other may be subject to a deductible, co-insurance, and/or copay. Some of these benefits include a comprehensive review of your medical history including lifestyle, illnesses that may run in your family, and various assessments and screenings as appropriate. After reviewing your medical record and screening and assessments performed today your provider may have ordered immunizations, labs, imaging, and/or referrals for you. A list of these orders (if applicable) as well as your Preventive Care list are included within your After Visit Summary for your review. Other Preventive Recommendations:    · A preventive eye exam performed by an eye specialist is recommended every 1-2 years to screen for glaucoma; cataracts, macular degeneration, and other eye disorders. · A preventive dental visit is recommended every 6 months. · Try to get at least 150 minutes of exercise per week or 10,000 steps per day on a pedometer . · Order or download the FREE \"Exercise & Physical Activity: Your Everyday Guide\" from [employer]. Call 6-345.300.7653 or search [employer] online. · You need 4744-3346 mg of calcium and 8313-2427 IU of vitamin D per day. It is possible to meet your calcium requirement with diet alone, but a vitamin D supplement is usually necessary to meet this goal.  · When exposed to the sun, use a sunscreen that protects against both UVA and UVB radiation with an SPF of 30 or greater. Reapply every 2 to 3 hours or after sweating, drying off with a towel, or swimming. · Always wear a seat belt when traveling in a car. Always wear a helmet when riding a bicycle or motorcycle. Heart-Healthy Diet   Sodium, Fat, and Cholesterol Controlled Diet       What Is a Heart Healthy Diet?    A heart-healthy diet is one that limits sodium , certain types of fat , and cholesterol . This type of diet is recommended for:   People with any form of cardiovascular disease (eg, coronary heart disease , peripheral vascular disease , previous heart attack , previous stroke )   People with risk factors for cardiovascular disease, such as high blood pressure , high cholesterol , or diabetes   Anyone who wants to lower their risk of developing cardiovascular disease   Sodium    Sodium is a mineral found in many foods. In general, most people consume much more sodium than they need. Diets high in sodium can increase blood pressure and lead to edema (water retention). On a heart-healthy diet, you should consume no more than 2,300 mg (milligrams) of sodium per dayabout the amount in one teaspoon of table salt. The foods highest in sodium include table salt (about 50% sodium), processed foods, convenience foods, and preserved foods. Cholesterol    Cholesterol is a fat-like, waxy substance in your blood. Our bodies make some cholesterol. It is also found in animal products, with the highest amounts in fatty meat, egg yolks, whole milk, cheese, shellfish, and organ meats. On a heart-healthy diet, you should limit your cholesterol intake to less than 200 mg per day. It is normal and important to have some cholesterol in your bloodstream. But too much cholesterol can cause plaque to build up within your arteries, which can eventually lead to a heart attack or stroke. The two types of cholesterol that are most commonly referred to are:   Low-density lipoprotein (LDL) cholesterol  Also known as bad cholesterol, this is the cholesterol that tends to build up along your arteries. Bad cholesterol levels are increased by eating fats that are saturated or hydrogenated. Optimal level of this cholesterol is less than 100. Over 130 starts to get risky for heart disease.    High-density lipoprotein (HDL) cholesterol  Also known as good cholesterol, this type of cholesterol actually carries cholesterol away from your arteries and may, therefore, help lower your risk of having a heart attack. You want this level to be high (ideally greater than 60). It is a risk to have a level less than 40. You can raise this good cholesterol by eating olive oil, canola oil, avocados, or nuts. Exercise raises this level, too. Fat    Fat is calorie dense and packs a lot of calories into a small amount of food. Even though fats should be limited due to their high calorie content, not all fats are bad. In fact, some fats are quite healthful. Fat can be broken down into four main types. The good-for-you fats are:   Monounsaturated fat  found in oils such as olive and canola, avocados, and nuts and natural nut butters; can decrease cholesterol levels, while keeping levels of HDL cholesterol high   Polyunsaturated fat  found in oils such as safflower, sunflower, soybean, corn, and sesame; can decrease total cholesterol and LDL cholesterol   Omega-3 fatty acids  particularly those found in fatty fish (such as salmon, trout, tuna, mackerel, herring, and sardines); can decrease risk of arrhythmias, decrease triglyceride levels, and slightly lower blood pressure   The fats that you want to limit are:   Saturated fat  found in animal products, many fast foods, and a few vegetables; increases total blood cholesterol, including LDL levels   Animal fats that are saturated include: butter, lard, whole-milk dairy products, meat fat, and poultry skin   Vegetable fats that are saturated include: hydrogenated shortening, palm oil, coconut oil, cocoa butter   Hydrogenated or trans fat  found in margarine and vegetable shortening, most shelf stable snack foods, and fried foods; increases LDL and decreases HDL     It is generally recommended that you limit your total fat for the day to less than 30% of your total calories.  If you follow an 1800-calorie heart healthy diet, for example, this would mean 60 grams of fat or less per day. Saturated fat and trans fat in your diet raises your blood cholesterol the most, much more than dietary cholesterol does. For this reason, on a heart-healthy diet, less than 7% of your calories should come from saturated fat and ideally 0% from trans fat. On an 1800-calorie diet, this translates into less than 14 grams of saturated fat per day, leaving 46 grams of fat to come from mono- and polyunsaturated fats.    Food Choices on a Heart Healthy Diet   Food Category   Foods Recommended   Foods to Avoid   Grains   Breads and rolls without salted tops Most dry and cooked cereals Unsalted crackers and breadsticks Low-sodium or homemade breadcrumbs or stuffing All rice and pastas   Breads, rolls, and crackers with salted tops High-fat baked goods (eg, muffins, donuts, pastries) Quick breads, self-rising flour, and biscuit mixes Regular bread crumbs Instant hot cereals Commercially prepared rice, pasta, or stuffing mixes   Vegetables   Most fresh, frozen, and low-sodium canned vegetables Low-sodium and salt-free vegetable juices Canned vegetables if unsalted or rinsed   Regular canned vegetables and juices, including sauerkraut and pickled vegetables Frozen vegetables with sauces Commercially prepared potato and vegetable mixes   Fruits   Most fresh, frozen, and canned fruits All fruit juices   Fruits processed with salt or sodium   Milk   Nonfat or low-fat (1%) milk Nonfat or low-fat yogurt Cottage cheese, low-fat ricotta, cheeses labeled as low-fat and low-sodium   Whole milk Reduced-fat (2%) milk Malted and chocolate milk Full fat yogurt Most cheeses (unless low-fat and low salt) Buttermilk (no more than 1 cup per week)   Meats and Beans   Lean cuts of fresh or frozen beef, veal, lamb, or pork (look for the word loin) Fresh or frozen poultry without the skin Fresh or frozen fish and some shellfish Egg whites and egg substitutes (Limit whole eggs to three per week) Tofu Nuts or seeds (unsalted, dry-roasted), low-sodium peanut butter Dried peas, beans, and lentils   Any smoked, cured, salted, or canned meat, fish, or poultry (including jones, chipped beef, cold cuts, hot dogs, sausages, sardines, and anchovies) Poultry skins Breaded and/or fried fish or meats Canned peas, beans, and lentils Salted nuts   Fats and Oils   Olive oil and canola oil Low-sodium, low-fat salad dressings and mayonnaise   Butter, margarine, coconut and palm oils, jones fat   Snacks, Sweets, and Condiments   Low-sodium or unsalted versions of broths, soups, soy sauce, and condiments Pepper, herbs, and spices; vinegar, lemon, or lime juice Low-fat frozen desserts (yogurt, sherbet, fruit bars) Sugar, cocoa powder, honey, syrup, jam, and preserves Low-fat, trans-fat free cookies, cakes, and pies Gian and animal crackers, fig bars, belén snaps   High-fat desserts Broth, soups, gravies, and sauces, made from instant mixes or other high-sodium ingredients Salted snack foods Canned olives Meat tenderizers, seasoning salt, and most flavored vinegars   Beverages   Low-sodium carbonated beverages Tea and coffee in moderation Soy milk   Commercially softened water   Suggestions   Make whole grains, fruits, and vegetables the base of your diet. Choose heart-healthy fats such as canola, olive, and flaxseed oil, and foods high in heart-healthy fats, such as nuts, seeds, soybeans, tofu, and fish. Eat fish at least twice per week; the fish highest in omega-3 fatty acids and lowest in mercury include salmon, herring, mackerel, sardines, and canned chunk light tuna. If you eat fish less than twice per week or have high triglycerides, talk to your doctor about taking fish oil supplements. Read food labels.    For products low in fat and cholesterol, look for fat free, low-fat, cholesterol free, saturated fat free, and trans fat freeAlso scan the Nutrition Facts Label, which lists saturated fat, trans fat, and cholesterol amounts. For products low in sodium, look for sodium free, very low sodium, low sodium, no added salt, and unsalted   Skip the salt when cooking or at the table; if food needs more flavor, get creative and try out different herbs and spices. Garlic and onion also add substantial flavor to foods. Trim any visible fat off meat and poultry before cooking, and drain the fat off after davis. Use cooking methods that require little or no added fat, such as grilling, boiling, baking, poaching, broiling, roasting, steaming, stir-frying, and sauting. Avoid fast food and convenience food. They tend to be high in saturated and trans fat and have a lot of added salt. Talk to a registered dietitian for individualized diet advice. Last Reviewed: March 2011 Adina Bass MS, MPH, RD   Updated: 3/29/2011   ·        Learning About Living [place]  What is a living will? A living will, also called a declaration, is a legal form. It tells your family and your doctor your wishes when you can't speak for yourself. It's used by the health professionals who will treat you as you near the end of your life or if you get seriously hurt or ill. If you put your wishes in writing, your loved ones and others will know what kind of care you want. They won't need to guess. This can ease your mind and be helpful to others. And you can change or cancel your living will at any time. A living will is not the same as an estate or property will. An estate will explains what you want to happen with your money and property after you die. How do you use it? A living will is used to describe the kinds of treatment or life support you want as you near the end of your life or if you get seriously hurt or ill. Keep these facts in mind about living sanders. Your living will is used only if you can't speak or make decisions for yourself.  Most often, one or more doctors must certify that you can't speak or decide for yourself before your living will takes effect. If you get better and can speak for yourself again, you can accept or refuse any treatment. It doesn't matter what you said in your living will. Some states may limit your right to refuse treatment in certain cases. For example, you may need to clearly state in your living will that you don't want artificial hydration and nutrition, such as being fed through a tube. Is a living will a legal document? A living will is a legal document. Each state has its own laws about living sanders. And a living will may be called something else in your state. Here are some things to know about living sanders. You don't need an  to complete a living will. But legal advice can be helpful if your state's laws are unclear. It can also help if your health history is complicated or your family can't agree on what should be in your living will. You can change your living will at any time. Some people find that their wishes about end-of-life care change as their health changes. If you make big changes to your living will, complete a new form. If you move to another state, make sure that your living will is legal in the state where you now live. In most cases, doctors will respect your wishes even if you have a form from a different state. You might use a universal form that has been approved by many states. This kind of form can sometimes be filled out and stored online. Your digital copy will then be available wherever you have a connection to the internet. The doctors and nurses who need to treat you can find it right away. Your state may offer an online registry. This is another place where you can store your living will online. It's a good idea to get your living will notarized. This means using a person called a  to watch two people sign, or witness, your living will. What should you know when you create a living will?   Here are some questions to ask yourself as you make your living will:  Do you know enough about life support methods that might be used? If not, talk to your doctor so you know what might be done if you can't breathe on your own, your heart stops, or you can't swallow. What things would you still want to be able to do after you receive life-support methods? Would you want to be able to walk? To speak? To eat on your own? To live without the help of machines? Do you want certain Mandaen practices performed if you become very ill? If you have a choice, where do you want to be cared for? In your home? At a hospital or nursing home? If you have a choice at the end of your life, where would you prefer to die? At home? In a hospital or nursing home? Somewhere else? Would you prefer to be buried or cremated? Do you want your organs to be donated after you die? What should you do with your living will? Make sure that your family members and your health care agent have copies of your living will (also called a declaration). Give your doctor a copy of your living will. Ask him or her to keep it as part of your medical record. If you have more than one doctor, make sure that each one has a copy. Put a copy of your living will where it can be easily found. For example, some people may put a copy on their refrigerator door. If you are using a digital copy, be sure your doctor, family members, and health care agent know how to find and access it. Where can you learn more? Go to [email or web address].[email or web address] org and sign in to your Droid system master account. Enter N508 in the KonTEM box to learn more about \"Learning About Living [place]. \"     If you do not have an account, please click on the \"Sign Up Now\" link. Current as of: March 17, 2021               Content Version: 13.1  © 7373-2248 Healthwise, Incorporated. Care instructions adapted under license by [facility] ([facility]).  If you have questions about a medical condition or this instruction, always ask your healthcare professional. [place] [address] any warranty or liability for your use of this information.     ·

## 2022-02-23 ENCOUNTER — OFFICE VISIT (OUTPATIENT)
Dept: CARDIOLOGY | Age: 67
End: 2022-02-23
Payer: MEDICARE

## 2022-02-23 VITALS
SYSTOLIC BLOOD PRESSURE: 147 MMHG | HEART RATE: 77 BPM | WEIGHT: 150 LBS | DIASTOLIC BLOOD PRESSURE: 70 MMHG | BODY MASS INDEX: 25.61 KG/M2 | HEIGHT: 64 IN

## 2022-02-23 DIAGNOSIS — I63.012 CEREBROVASCULAR ACCIDENT (CVA) DUE TO THROMBOSIS OF LEFT VERTEBRAL ARTERY (HCC): Primary | ICD-10-CM

## 2022-02-23 DIAGNOSIS — I10 PRIMARY HYPERTENSION: ICD-10-CM

## 2022-02-23 DIAGNOSIS — E78.2 MIXED HYPERLIPIDEMIA: ICD-10-CM

## 2022-02-23 DIAGNOSIS — I35.1 AORTIC VALVE INSUFFICIENCY, ETIOLOGY OF CARDIAC VALVE DISEASE UNSPECIFIED: ICD-10-CM

## 2022-02-23 DIAGNOSIS — I34.0 MITRAL VALVE INSUFFICIENCY, UNSPECIFIED ETIOLOGY: ICD-10-CM

## 2022-02-23 PROCEDURE — G8427 DOCREV CUR MEDS BY ELIG CLIN: HCPCS | Performed by: INTERNAL MEDICINE

## 2022-02-23 PROCEDURE — 93010 ELECTROCARDIOGRAM REPORT: CPT | Performed by: INTERNAL MEDICINE

## 2022-02-23 PROCEDURE — 1090F PRES/ABSN URINE INCON ASSESS: CPT | Performed by: INTERNAL MEDICINE

## 2022-02-23 PROCEDURE — 3017F COLORECTAL CA SCREEN DOC REV: CPT | Performed by: INTERNAL MEDICINE

## 2022-02-23 PROCEDURE — G8484 FLU IMMUNIZE NO ADMIN: HCPCS | Performed by: INTERNAL MEDICINE

## 2022-02-23 PROCEDURE — 1036F TOBACCO NON-USER: CPT | Performed by: INTERNAL MEDICINE

## 2022-02-23 PROCEDURE — 1123F ACP DISCUSS/DSCN MKR DOCD: CPT | Performed by: INTERNAL MEDICINE

## 2022-02-23 PROCEDURE — 4040F PNEUMOC VAC/ADMIN/RCVD: CPT | Performed by: INTERNAL MEDICINE

## 2022-02-23 PROCEDURE — 99214 OFFICE O/P EST MOD 30 MIN: CPT | Performed by: INTERNAL MEDICINE

## 2022-02-23 PROCEDURE — 99213 OFFICE O/P EST LOW 20 MIN: CPT | Performed by: INTERNAL MEDICINE

## 2022-02-23 PROCEDURE — G8400 PT W/DXA NO RESULTS DOC: HCPCS | Performed by: INTERNAL MEDICINE

## 2022-02-23 PROCEDURE — G8417 CALC BMI ABV UP PARAM F/U: HCPCS | Performed by: INTERNAL MEDICINE

## 2022-02-23 PROCEDURE — 93005 ELECTROCARDIOGRAM TRACING: CPT | Performed by: INTERNAL MEDICINE

## 2022-02-23 RX ORDER — CARVEDILOL 3.12 MG/1
3.12 TABLET ORAL DAILY
Qty: 60 TABLET | Refills: 5 | Status: SHIPPED | OUTPATIENT
Start: 2022-02-23 | End: 2022-06-08

## 2022-02-23 NOTE — PROGRESS NOTES
Today's Date: 2/23/2022  Patient's Name: Tiffanie Levy  Patient's age: 74 y.o., 1955    Subjective:  Tiffanie Levy is being seen in clinic today regarding CVA, HL    she is here for here follow up. No chest pain, no PND, no syncope or pre-syncope, no orthopnea. She reports dyspnea on moderate exertion    BP at home 140/70    Past Medical History:   has a past medical history of Cerebrovascular accident (CVA) due to occlusion of left vertebral artery ([address] Corneal neovascularization, Elevated fasting blood sugar, Hyperlipidemia, Hypothyroidism, IFG (impaired fasting glucose), Knee pain, Myopia with astigmatism, Neck pain, and Osteoarthritis. Past Surgical History:   has a past surgical history that includes Colonoscopy (05/2008); Insertable Cardiac Monitor (06/12/2017); Tonsillectomy; and pr colonoscopy flx dx w/collj spec when pfrmd (N/A, 7/11/2018). Home Medications:  Prior to Admission medications    Medication Sig Start Date End Date Taking? Authorizing Provider   levothyroxine (SYNTHROID) 112 MCG tablet Take 1 tablet by mouth daily 2/9/22   Wilfrid Stockton, DO   clopidogrel (PLAVIX) 75 MG tablet take 1 tablet by mouth once daily 2/9/22   Wilfrid Stockton, DO   atorvastatin (LIPITOR) 40 MG tablet take 1 tablet by mouth at bedtime 2/9/22   Wilfrid Stockton, DO   calcium carbonate 600 MG TABS tablet Take 1 tablet by mouth daily    Historical Provider, MD   Probiotic Product (PROBIOTIC DAILY PO) Take by mouth    Historical Provider, MD   aspirin 81 MG tablet Take 81 mg by mouth daily    Historical Provider, MD       Allergies:  Patient has no known allergies. Social History:   reports that she quit smoking about 20 years ago. Her smoking use included cigarettes. She started smoking about 49 years ago. She has a 50.00 pack-year smoking history. She has never used smokeless tobacco. She reports current alcohol use. She reports that she does not use drugs.      Family History: family history includes Cancer in her father; Heart Disease in her maternal grandfather, maternal grandmother, paternal grandfather, and paternal grandmother; Stroke in her maternal grandfather, maternal grandmother, paternal grandfather, and paternal grandmother; Thyroid Disease in her mother. No h/o sudden cardiac death. No for premature CAD    REVIEW OF SYSTEMS:    · Constitutional: there has been no unanticipated weight loss. There's been No change in energy level, No change in activity level. · Eyes: No visual changes or diplopia. No scleral icterus. · ENT: No Headaches, hearing loss or vertigo. No mouth sores or sore throat. · Cardiovascular: see above  · Respiratory: see above  · Gastrointestinal: No abdominal pain, appetite loss, blood in stools. · Genitourinary: No dysuria, trouble voiding, or hematuria. · Musculoskeletal:  No gait disturbance, No weakness or joint complaints. · Integumentary: No rash or pruritis. · Neurological: No headache or diplopia. No tingling  · Psychiatric: No anxiety, or depression. · Endocrine: No temperature intolerance. · Hematologic/Lymphatic: No abnormal bruising or bleeding, blood clots or swollen lymph nodes. · Allergic/Immunologic: No nasal congestion or hives. PHYSICAL EXAM:      BP (!) 147/70   Pulse 77   Ht 5' 4\" (1.626 m)   Wt 150 lb (68 kg)   BMI 25.75 kg/m²    Constitutional and General Appearance: alert, cooperative, no distress and appears stated age  [de-identified]: PERRL, no cervical lymphadenopathy. No masses palpable. Normal oral mucosa  Respiratory:  · Normal excursion and expansion without use of accessory muscles  · Resp Auscultation: Good respiratory effort. No for increased work of breathing.  On auscultation: clear to auscultation bilaterally  Cardiovascular:  · Heart tones are crisp and normal. regular S1 and S2.  · Jugular venous pulsation Normal  · The carotid upstroke is normal in amplitude and contour without delay or bruit   Abdomen:   · soft  · Bowel sounds present  Extremities:  ·  No edema  Neurological:  · Alert and oriented. Cardiac Data:  EKG: NSR, NL ECG    Labs:     CBC: No results for input(s): WBC, HGB, HCT, PLT in the last 72 hours. BMP: No results for input(s): NA, K, CO2, BUN, CREATININE, LABGLOM, GLUCOSE in the last 72 hours. PT/INR: No results for input(s): PROTIME, INR in the last 72 hours. FASTING LIPID PANEL:  Lab Results   Component Value Date    HDL 56 01/31/2022    TRIG 150 01/31/2022     LIVER PROFILE:No results for input(s): AST, ALT, LABALBU in the last 72 hours. Problem List:  Patient Active Problem List   Diagnosis    Hypothyroidism    Impaired fasting glucose    Mixed hyperlipidemia    Gait abnormality    Cerebrovascular accident (CVA) due to thrombosis of left vertebral artery ([address]        TTE 01/14/2020  Summary  Normal left ventricular diameter. Left ventricular systolic function is normal.  Left ventricular ejection fraction 69 %. Grade II (moderate) left ventricular diastolic dysfunction. Left atrium is mildly dilated. Aortic leaflet calcification with mild stenosis. Peak instantaneous gradient 21 mmHg and mean gradient 12 mmHg. Mild aortic insufficiency. Normal mitral valve leaflets. Mild mitral regurgitation. Normal tricuspid valve leaflets. Mild tricuspid regurgitation. Estimated right ventricular systolic pressure is 38 mmHg. No significant pericardial effusion is seen.        TTE 01/26/2021     Summary  Normal left ventricular diameter. Left ventricular systolic function is normal.  Left ventricular ejection fraction 55 %. Grade I (mild) left ventricular diastolic dysfunction. Left atrial size is at the upper limits of normal.  Aortic valve is sclerotic but opens well. Mild to moderate aortic insufficiency. Mild mitral valve thickening with annular calcification. Moderate mitral regurgitation. Normal tricuspid valve leaflets. Mild to moderate tricuspid regurgitation.   Estimated right ventricular systolic pressure is 32 mmHg. No significant pericardial effusion is seen.     Stress test 2/15/21  IMPRESSION:  1. No ischemia or infarct. 2. Left ventricular ejection fraction 69%, with normal wall motion.     Carotid ultrasound 2/15/21  Negative bilateral carotid with no hemodynamically significant stenosis.     Assessment and plan:     -Dyspnea on exertion- Treadmill cardiolite stress test was negative for ischemia on 2/15/21. -Echo 1/26/21 showed LVEF 55% with mild to moderate AI, moderate MR and mild to moderate TR. Obtain TTE to evaluate for progression of MR and AI  -CVA 2017- on ASA/plavix. Has loop recorder. Battery depleted 9/24/21 interrogation. Was implanted 4 years ago. -Negative carotid u/s 2/15/21  -Hyperlipidemia- Stable. Continue statin. LDL 56 on 1/31/22  -Hypothyroidism- on replacement.  -HTN. Patient to monitor BP at home. Coreg 3.125mg po BID  -RTC 6 months.     Sosa Sanderson [id number]  [phone]

## 2022-02-23 NOTE — PROGRESS NOTES
2-18-20  Clinic Care Coordination Contact  [facility]/Voicemail       Clinical Data: Care Coordinator Outreach  Outreach attempted x 2.  Left message on patient's voicemail with call back information and requested return call.  Plan: Care Coordinator will send unable to contact letter with care coordinator contact information via mail. Care Coordinator will try to reach patient again in 10 business days.    CHW will post outreach 2 weeks due to patient working to allow patient to respond to letter and voice message.  If patient does not respond in 2 weeks CHW will send disenrollment letter and discharge from [facility] ([facility] at that time.         ekg

## 2022-05-05 ENCOUNTER — TELEPHONE (OUTPATIENT)
Dept: CARDIOLOGY | Age: 67
End: 2022-05-05

## 2022-05-05 ENCOUNTER — HOSPITAL ENCOUNTER (OUTPATIENT)
Dept: NON INVASIVE DIAGNOSTICS | Age: 67
Discharge: HOME | End: 2022-05-05
Payer: MEDICARE

## 2022-05-05 DIAGNOSIS — I34.0 MITRAL VALVE INSUFFICIENCY, UNSPECIFIED ETIOLOGY: ICD-10-CM

## 2022-05-05 DIAGNOSIS — I35.1 AORTIC VALVE INSUFFICIENCY, ETIOLOGY OF CARDIAC VALVE DISEASE UNSPECIFIED: ICD-10-CM

## 2022-05-05 LAB
LV EF: 60 %
LVEF MODALITY: NORMAL

## 2022-05-05 PROCEDURE — 93306 TTE W/DOPPLER COMPLETE: CPT

## 2022-06-08 ENCOUNTER — OFFICE VISIT (OUTPATIENT)
Dept: PRIMARY CARE CLINIC | Age: 67
End: 2022-06-08
Payer: MEDICARE

## 2022-06-08 ENCOUNTER — HOSPITAL ENCOUNTER (OUTPATIENT)
Dept: CT IMAGING | Age: 67
Discharge: HOME | End: 2022-06-10
Payer: MEDICARE

## 2022-06-08 VITALS
SYSTOLIC BLOOD PRESSURE: 146 MMHG | BODY MASS INDEX: 25.27 KG/M2 | OXYGEN SATURATION: 97 % | TEMPERATURE: 98 F | DIASTOLIC BLOOD PRESSURE: 76 MMHG | WEIGHT: 148 LBS | HEIGHT: 64 IN | HEART RATE: 71 BPM

## 2022-06-08 DIAGNOSIS — I10 PRIMARY HYPERTENSION: ICD-10-CM

## 2022-06-08 DIAGNOSIS — R51.9 ACUTE INTRACTABLE HEADACHE, UNSPECIFIED HEADACHE TYPE: Primary | ICD-10-CM

## 2022-06-08 DIAGNOSIS — R51.9 ACUTE INTRACTABLE HEADACHE, UNSPECIFIED HEADACHE TYPE: ICD-10-CM

## 2022-06-08 PROCEDURE — G8400 PT W/DXA NO RESULTS DOC: HCPCS | Performed by: FAMILY MEDICINE

## 2022-06-08 PROCEDURE — G8417 CALC BMI ABV UP PARAM F/U: HCPCS | Performed by: FAMILY MEDICINE

## 2022-06-08 PROCEDURE — 99213 OFFICE O/P EST LOW 20 MIN: CPT | Performed by: FAMILY MEDICINE

## 2022-06-08 PROCEDURE — 1036F TOBACCO NON-USER: CPT | Performed by: FAMILY MEDICINE

## 2022-06-08 PROCEDURE — 99214 OFFICE O/P EST MOD 30 MIN: CPT | Performed by: FAMILY MEDICINE

## 2022-06-08 PROCEDURE — G8427 DOCREV CUR MEDS BY ELIG CLIN: HCPCS | Performed by: FAMILY MEDICINE

## 2022-06-08 PROCEDURE — 70450 CT HEAD/BRAIN W/O DYE: CPT

## 2022-06-08 PROCEDURE — 1123F ACP DISCUSS/DSCN MKR DOCD: CPT | Performed by: FAMILY MEDICINE

## 2022-06-08 PROCEDURE — 3017F COLORECTAL CA SCREEN DOC REV: CPT | Performed by: FAMILY MEDICINE

## 2022-06-08 PROCEDURE — 1090F PRES/ABSN URINE INCON ASSESS: CPT | Performed by: FAMILY MEDICINE

## 2022-06-08 RX ORDER — CARVEDILOL 3.12 MG/1
3.12 TABLET ORAL 2 TIMES DAILY
Qty: 180 TABLET | Refills: 1 | Status: SHIPPED | OUTPATIENT
Start: 2022-06-08

## 2022-06-08 ASSESSMENT — ENCOUNTER SYMPTOMS
COUGH: 0
EYE DISCHARGE: 0
CONSTIPATION: 0
EYE REDNESS: 0
SHORTNESS OF BREATH: 0
RHINORRHEA: 0
TROUBLE SWALLOWING: 0
ABDOMINAL PAIN: 0
EYE PAIN: 0
WHEEZING: 0
NAUSEA: 0
DIARRHEA: 0
SINUS PRESSURE: 0
SINUS PAIN: 0
PHOTOPHOBIA: 0
SORE THROAT: 0
VOMITING: 0

## 2022-06-08 ASSESSMENT — PATIENT HEALTH QUESTIONNAIRE - PHQ9
SUM OF ALL RESPONSES TO PHQ QUESTIONS 1-9: 0
SUM OF ALL RESPONSES TO PHQ9 QUESTIONS 1 & 2: 0
SUM OF ALL RESPONSES TO PHQ QUESTIONS 1-9: 0
1. LITTLE INTEREST OR PLEASURE IN DOING THINGS: 0
2. FEELING DOWN, DEPRESSED OR HOPELESS: 0

## 2022-06-08 NOTE — PROGRESS NOTES
2022     Martina Petty (:  1955) is a 74 y.o. female, here for evaluation of the following medical concerns:    Headache   This is a new problem. The current episode started 1 to 4 weeks ago (over last 2 weeks has had persistent headache. Worse in last 24 hours). The problem occurs constantly. The problem has been rapidly worsening. The pain is located in the right unilateral, frontal and temporal (primarily right sided) region. The pain quality is not similar to prior headaches. The quality of the pain is described as stabbing, throbbing and sharp. The pain is severe. Associated symptoms include neck pain (slight increased neck pain with increased headache). Pertinent negatives include no abdominal pain, coughing, dizziness, ear pain, eye pain, eye redness, fever, insomnia, nausea, phonophobia, photophobia, rhinorrhea, sinus pressure, sore throat, tinnitus, vomiting or weakness. Associated symptoms comments: States that this headache is similar to what she had prior to having a CVA. Brittany Renny Blood pressure was elevated today when she checked it. She has tried acetaminophen for the symptoms. The treatment provided mild relief. Did review patient's med list, allergies, social history,pmhx and pshx today as noted in the record. Review of Systems   Constitutional: Negative for chills, fatigue and fever. HENT: Negative for congestion, ear pain, postnasal drip, rhinorrhea, sinus pressure, sinus pain, sore throat, tinnitus and trouble swallowing. Eyes: Negative for photophobia, pain, discharge and redness. Respiratory: Negative for cough, shortness of breath and wheezing. Cardiovascular: Negative for chest pain. Gastrointestinal: Negative for abdominal pain, constipation, diarrhea, nausea and vomiting. Genitourinary: Negative for dysuria, flank pain, frequency and urgency. Musculoskeletal: Positive for neck pain (slight increased neck pain with increased headache).  Negative for arthralgias and myalgias. Skin: Negative for rash and wound. Allergic/Immunologic: Negative for environmental allergies. Neurological: Positive for headaches. Negative for dizziness, weakness and light-headedness. Hematological: Negative for adenopathy. Psychiatric/Behavioral: Negative. The patient does not have insomnia. Prior to Visit Medications    Medication Sig Taking? Authorizing Provider   Multiple Vitamin (MULTI VITAMIN DAILY PO) Take by mouth Yes Historical Provider, MD   carvedilol (COREG) 3.125 MG tablet Take 1 tablet by mouth daily Yes Lizandro Decker MD   levothyroxine (SYNTHROID) 112 MCG tablet Take 1 tablet by mouth daily Yes Annette Hamman, DO   clopidogrel (PLAVIX) 75 MG tablet take 1 tablet by mouth once daily Yes Annette Hamman, DO   atorvastatin (LIPITOR) 40 MG tablet take 1 tablet by mouth at bedtime Yes Annette Hamman, DO   calcium carbonate 600 MG TABS tablet Take 1 tablet by mouth daily Yes Historical Provider, MD   Probiotic Product (PROBIOTIC DAILY PO) Take by mouth Yes Historical Provider, MD   aspirin 81 MG tablet Take 81 mg by mouth daily Yes Historical Provider, MD        Social History     Tobacco Use    Smoking status: Former Smoker     Packs/day: 2.00     Years: 25.00     Pack years: 50.00     Types: Cigarettes     Start date: 1973     Quit date: 2001     Years since quittin.0    Smokeless tobacco: Never Used    Tobacco comment: quit in    Substance Use Topics    Alcohol use: Yes     Comment: socia, glass of wine at night        Vitals:    22 1017   BP: (!) 146/76   Pulse: 71   Temp: 98 °F (36.7 °C)   TempSrc: Temporal   SpO2: 97%   Weight: 148 lb (67.1 kg)   Height: 5' 4\" (1.626 m)     Estimated body mass index is 25.4 kg/m² as calculated from the following:    Height as of this encounter: 5' 4\" (1.626 m). Weight as of this encounter: 148 lb (67.1 kg). Physical Exam  Vitals and nursing note reviewed.    Constitutional: General: She is not in acute distress. Appearance: Normal appearance. She is well-developed. She is not diaphoretic. HENT:      Head: Normocephalic and atraumatic. Right Ear: Tympanic membrane, ear canal and external ear normal.      Left Ear: Tympanic membrane, ear canal and external ear normal.      Nose: Nose normal.      Mouth/Throat:      Mouth: Mucous membranes are moist.      Pharynx: Oropharynx is clear. No oropharyngeal exudate. Eyes:      General:         Right eye: No discharge. Left eye: No discharge. Conjunctiva/sclera: Conjunctivae normal.      Pupils: Pupils are equal, round, and reactive to light. Neck:      Thyroid: No thyromegaly. Cardiovascular:      Rate and Rhythm: Normal rate and regular rhythm. Heart sounds: Normal heart sounds. Pulmonary:      Effort: Pulmonary effort is normal.      Breath sounds: Normal breath sounds. No wheezing or rales. Abdominal:      General: Bowel sounds are normal. There is no distension. Palpations: Abdomen is soft. Tenderness: There is no abdominal tenderness. Musculoskeletal:      Cervical back: Normal range of motion and neck supple. Lymphadenopathy:      Cervical: No cervical adenopathy. Skin:     General: Skin is warm and dry. Findings: No rash. Neurological:      Mental Status: She is alert and oriented to person, place, and time. Cranial Nerves: Cranial nerves are intact. Sensory: Sensation is intact. Motor: Motor function is intact. Coordination: Coordination is intact. Gait: Gait is intact. Deep Tendon Reflexes:      Reflex Scores:       Patellar reflexes are 2+ on the right side and 2+ on the left side. Psychiatric:         Behavior: Behavior normal.         Thought Content: Thought content normal.         Judgment: Judgment normal.         ASSESSMENT/PLAN:  Encounter Diagnoses   Name Primary?     Acute intractable headache, unspecified headache type Yes    Primary hypertension        Orders Placed This Encounter   Medications    carvedilol (COREG) 3.125 MG tablet     Sig: Take 1 tablet by mouth 2 times daily     Dispense:  180 tablet     Refill:  1     Orders Placed This Encounter   Procedures    CT HEAD WO CONTRAST     Patient has only been taking coreg once daily as that is how the cardiologist wrote it, but I did recommend increasing dose to bid dosing. Would watch her blood pressure out of office to ensure that it has come into a normal range. CT HEAD WO CONTRAST  Narrative: EXAMINATION:  CT OF THE HEAD WITHOUT CONTRAST  6/8/2022 11:20 am    TECHNIQUE:  CT of the head was performed without the administration of intravenous  contrast. Automated exposure control, iterative reconstruction, and/or weight  based adjustment of the mA/kV was utilized to reduce the radiation dose to as  low as reasonably achievable. COMPARISON:  06/03/2020    HISTORY:  ORDERING SYSTEM PROVIDED HISTORY: Acute intractable headache, unspecified  headache type  TECHNOLOGIST PROVIDED HISTORY:  Headache off and on for 2 weeks. Worse today. Right sided. History of CVA  in the past with similar symptoms  Reason for Exam: Worsening rt sided headache; hx of stroke in 2017    FINDINGS:  BRAIN/VENTRICLES: There is no acute intracranial hemorrhage, mass effect or  midline shift. No abnormal extra-axial fluid collection. The gray-white  differentiation is maintained without evidence of an acute infarct. There is  no evidence of hydrocephalus. ORBITS: The visualized portion of the orbits demonstrate no acute abnormality. SINUSES: The visualized paranasal sinuses and mastoid air cells demonstrate  no acute abnormality. SOFT TISSUES/SKULL:  No acute abnormality of the visualized skull or soft  tissues. Impression: No acute intracranial abnormality. Did give 4 Ubrelvy 100mg samples and advised patient to take one tab daily if needed for acute headache.   Hopefully getting blood pressure under better control will help the headaches and won't need long term headache medication. Return  if no improvement in symptoms or if any further symptoms arise. No follow-ups on file. An electronic signature was used to authenticate this note.     --Savanna Garcia, DO on 6/8/2022 at 11:00 AM

## 2022-08-08 ENCOUNTER — HOSPITAL ENCOUNTER (OUTPATIENT)
Dept: LAB | Age: 67
Discharge: HOME | End: 2022-08-08
Payer: MEDICARE

## 2022-08-08 DIAGNOSIS — E78.2 MIXED HYPERLIPIDEMIA: ICD-10-CM

## 2022-08-08 DIAGNOSIS — R73.01 IMPAIRED FASTING GLUCOSE: ICD-10-CM

## 2022-08-08 DIAGNOSIS — E03.9 ACQUIRED HYPOTHYROIDISM: ICD-10-CM

## 2022-08-08 LAB
ABSOLUTE EOS #: 0.14 K/UL (ref 0–0.44)
ABSOLUTE IMMATURE GRANULOCYTE: <0.03 K/UL (ref 0–0.3)
ABSOLUTE LYMPH #: 1.94 K/UL (ref 1.1–3.7)
ABSOLUTE MONO #: 0.35 K/UL (ref 0.1–1.2)
ALBUMIN SERPL-MCNC: 4.7 G/DL (ref 3.5–5.2)
ALBUMIN/GLOBULIN RATIO: 2.4 (ref 1–2.5)
ALP BLD-CCNC: 95 U/L (ref 35–104)
ALT SERPL-CCNC: 21 U/L (ref 5–33)
ANION GAP SERPL CALCULATED.3IONS-SCNC: 9 MMOL/L (ref 9–17)
AST SERPL-CCNC: 19 U/L
BASOPHILS # BLD: 1 % (ref 0–2)
BASOPHILS ABSOLUTE: <0.03 K/UL (ref 0–0.2)
BILIRUB SERPL-MCNC: 0.79 MG/DL (ref 0.3–1.2)
BUN BLDV-MCNC: 16 MG/DL (ref 8–23)
BUN/CREAT BLD: 22 (ref 9–20)
CALCIUM SERPL-MCNC: 9.4 MG/DL (ref 8.6–10.4)
CHLORIDE BLD-SCNC: 104 MMOL/L (ref 98–107)
CHOLESTEROL/HDL RATIO: 2.5
CHOLESTEROL: 138 MG/DL
CO2: 29 MMOL/L (ref 20–31)
CREAT SERPL-MCNC: 0.72 MG/DL (ref 0.5–0.9)
EOSINOPHILS RELATIVE PERCENT: 3 % (ref 1–4)
ESTIMATED AVERAGE GLUCOSE: 111 MG/DL
GFR AFRICAN AMERICAN: >60 ML/MIN
GFR NON-AFRICAN AMERICAN: >60 ML/MIN
GFR SERPL CREATININE-BSD FRML MDRD: ABNORMAL ML/MIN/{1.73_M2}
GLUCOSE BLD-MCNC: 109 MG/DL (ref 70–99)
HBA1C MFR BLD: 5.5 % (ref 4–6)
HCT VFR BLD CALC: 39 % (ref 36.3–47.1)
HDLC SERPL-MCNC: 56 MG/DL
HEMOGLOBIN: 13.2 G/DL (ref 11.9–15.1)
IMMATURE GRANULOCYTES: 0 %
LDL CHOLESTEROL: 58 MG/DL (ref 0–130)
LYMPHOCYTES # BLD: 44 % (ref 24–43)
MCH RBC QN AUTO: 32.4 PG (ref 25.2–33.5)
MCHC RBC AUTO-ENTMCNC: 33.8 G/DL (ref 25.2–33.5)
MCV RBC AUTO: 95.6 FL (ref 82.6–102.9)
MONOCYTES # BLD: 8 % (ref 3–12)
NRBC AUTOMATED: 0 PER 100 WBC
PDW BLD-RTO: 12.3 % (ref 11.8–14.4)
PLATELET # BLD: 181 K/UL (ref 138–453)
PMV BLD AUTO: 9.4 FL (ref 8.1–13.5)
POTASSIUM SERPL-SCNC: 4.1 MMOL/L (ref 3.7–5.3)
RBC # BLD: 4.08 M/UL (ref 3.95–5.11)
SEG NEUTROPHILS: 44 % (ref 36–65)
SEGMENTED NEUTROPHILS ABSOLUTE COUNT: 1.93 K/UL (ref 1.5–8.1)
SODIUM BLD-SCNC: 142 MMOL/L (ref 135–144)
THYROXINE, FREE: 1.25 NG/DL (ref 0.93–1.7)
TOTAL PROTEIN: 6.7 G/DL (ref 6.4–8.3)
TRIGL SERPL-MCNC: 122 MG/DL
TSH SERPL DL<=0.05 MIU/L-ACNC: 1.79 UIU/ML (ref 0.3–5)
WBC # BLD: 4.4 K/UL (ref 3.5–11.3)

## 2022-08-08 PROCEDURE — 80061 LIPID PANEL: CPT

## 2022-08-08 PROCEDURE — 36415 COLL VENOUS BLD VENIPUNCTURE: CPT

## 2022-08-08 PROCEDURE — 84443 ASSAY THYROID STIM HORMONE: CPT

## 2022-08-08 PROCEDURE — 84439 ASSAY OF FREE THYROXINE: CPT

## 2022-08-08 PROCEDURE — 83036 HEMOGLOBIN GLYCOSYLATED A1C: CPT

## 2022-08-08 PROCEDURE — 85025 COMPLETE CBC W/AUTO DIFF WBC: CPT

## 2022-08-08 PROCEDURE — 80053 COMPREHEN METABOLIC PANEL: CPT

## 2022-08-10 ENCOUNTER — OFFICE VISIT (OUTPATIENT)
Dept: FAMILY MEDICINE CLINIC | Age: 67
End: 2022-08-10
Payer: MEDICARE

## 2022-08-10 VITALS
TEMPERATURE: 97.2 F | DIASTOLIC BLOOD PRESSURE: 74 MMHG | HEIGHT: 64 IN | SYSTOLIC BLOOD PRESSURE: 138 MMHG | OXYGEN SATURATION: 98 % | RESPIRATION RATE: 16 BRPM | HEART RATE: 74 BPM | BODY MASS INDEX: 25.61 KG/M2 | WEIGHT: 150 LBS

## 2022-08-10 DIAGNOSIS — R73.01 IMPAIRED FASTING GLUCOSE: Primary | ICD-10-CM

## 2022-08-10 DIAGNOSIS — E78.2 MIXED HYPERLIPIDEMIA: ICD-10-CM

## 2022-08-10 DIAGNOSIS — Z12.31 ENCOUNTER FOR SCREENING MAMMOGRAM FOR MALIGNANT NEOPLASM OF BREAST: ICD-10-CM

## 2022-08-10 DIAGNOSIS — I63.012 CEREBROVASCULAR ACCIDENT (CVA) DUE TO THROMBOSIS OF LEFT VERTEBRAL ARTERY (HCC): ICD-10-CM

## 2022-08-10 DIAGNOSIS — E03.9 ACQUIRED HYPOTHYROIDISM: ICD-10-CM

## 2022-08-10 PROCEDURE — G8417 CALC BMI ABV UP PARAM F/U: HCPCS | Performed by: FAMILY MEDICINE

## 2022-08-10 PROCEDURE — G8427 DOCREV CUR MEDS BY ELIG CLIN: HCPCS | Performed by: FAMILY MEDICINE

## 2022-08-10 PROCEDURE — 99214 OFFICE O/P EST MOD 30 MIN: CPT | Performed by: FAMILY MEDICINE

## 2022-08-10 PROCEDURE — 1036F TOBACCO NON-USER: CPT | Performed by: FAMILY MEDICINE

## 2022-08-10 PROCEDURE — 99212 OFFICE O/P EST SF 10 MIN: CPT | Performed by: FAMILY MEDICINE

## 2022-08-10 PROCEDURE — G8400 PT W/DXA NO RESULTS DOC: HCPCS | Performed by: FAMILY MEDICINE

## 2022-08-10 PROCEDURE — 3017F COLORECTAL CA SCREEN DOC REV: CPT | Performed by: FAMILY MEDICINE

## 2022-08-10 PROCEDURE — 1123F ACP DISCUSS/DSCN MKR DOCD: CPT | Performed by: FAMILY MEDICINE

## 2022-08-10 PROCEDURE — 1090F PRES/ABSN URINE INCON ASSESS: CPT | Performed by: FAMILY MEDICINE

## 2022-08-10 ASSESSMENT — ENCOUNTER SYMPTOMS
EYE DISCHARGE: 0
ABDOMINAL PAIN: 0
COUGH: 0
RHINORRHEA: 0
VOMITING: 0
CONSTIPATION: 0
WHEEZING: 0
SHORTNESS OF BREATH: 0
EYE REDNESS: 0
TROUBLE SWALLOWING: 0
DIARRHEA: 0
SINUS PRESSURE: 0
SORE THROAT: 0
NAUSEA: 0

## 2022-08-10 NOTE — PROGRESS NOTES
8/10/2022     Leland David (:  1955) is a 69 y.o. female, here for evaluation of the following medical concerns:    HPI  Patient comes in today for follow up of chronic health issues Patient overall seems to be doing relatively well. Has had a very busy summer. She does note that she has some increased issues with fatigue overall. Does not sleep as well at night and has frequent awakenings. Does not seem to have much trouble falling asleep but does wake up frequently throughout the night. With regards to her chronic health conditions she has a known history of impaired fasting glucose her blood sugar level is stable and controlled with her current dietary intake. Has a known history of hyperlipidemia and her cholesterol levels are stable and controlled on her current statin dose. Does have a known history of hypothyroidism and her thyroid levels are stable and adequately supplemented on her current thyroid dose. Has a known remote history of CVA and has done well overall physically and continues on Plavix without difficulty. Patient otherwise has no other acute medical concerns. .  Patient's recent lab reports are as follows:    Results for orders placed or performed during the hospital encounter of 22   T4, Free   Result Value Ref Range    Thyroxine, Free 1.25 0.93 - 1.70 ng/dL   TSH without Reflex   Result Value Ref Range    TSH 1.79 0.30 - 5.00 uIU/mL   Lipid Panel   Result Value Ref Range    Cholesterol 138 <200 mg/dL    HDL 56 >40 mg/dL    LDL Cholesterol 58 0 - 130 mg/dL    Chol/HDL Ratio 2.5 <5    Triglycerides 122 <150 mg/dL   Hemoglobin A1C   Result Value Ref Range    Hemoglobin A1C 5.5 4.0 - 6.0 %    Estimated Avg Glucose 111 mg/dL   Comprehensive Metabolic Panel   Result Value Ref Range    Glucose 109 (H) 70 - 99 mg/dL    BUN 16 8 - 23 mg/dL    Creatinine 0.72 0.50 - 0.90 mg/dL    Bun/Cre Ratio 22 (H) 9 - 20    Calcium 9.4 8.6 - 10.4 mg/dL    Sodium 142 135 - 144 mmol/L Potassium 4.1 3.7 - 5.3 mmol/L    Chloride 104 98 - 107 mmol/L    CO2 29 20 - 31 mmol/L    Anion Gap 9 9 - 17 mmol/L    Alkaline Phosphatase 95 35 - 104 U/L    ALT 21 5 - 33 U/L    AST 19 <32 U/L    Total Bilirubin 0.79 0.3 - 1.2 mg/dL    Total Protein 6.7 6.4 - 8.3 g/dL    Albumin 4.7 3.5 - 5.2 g/dL    Albumin/Globulin Ratio 2.4 1.0 - 2.5    GFR Non-African American >60 >60 mL/min    GFR African American >60 >60 mL/min    GFR Comment         CBC Auto Differential   Result Value Ref Range    WBC 4.4 3.5 - 11.3 k/uL    RBC 4.08 3.95 - 5.11 m/uL    Hemoglobin 13.2 11.9 - 15.1 g/dL    Hematocrit 39.0 36.3 - 47.1 %    MCV 95.6 82.6 - 102.9 fL    MCH 32.4 25.2 - 33.5 pg    MCHC 33.8 (H) 25.2 - 33.5 g/dL    RDW 12.3 11.8 - 14.4 %    Platelets 622 199 - 276 k/uL    MPV 9.4 8.1 - 13.5 fL    NRBC Automated 0.0 0.0 per 100 WBC    Seg Neutrophils 44 36 - 65 %    Lymphocytes 44 (H) 24 - 43 %    Monocytes 8 3 - 12 %    Eosinophils % 3 1 - 4 %    Basophils 1 0 - 2 %    Immature Granulocytes 0 0 %    Segs Absolute 1.93 1.50 - 8.10 k/uL    Absolute Lymph # 1.94 1.10 - 3.70 k/uL    Absolute Mono # 0.35 0.10 - 1.20 k/uL    Absolute Eos # 0.14 0.00 - 0.44 k/uL    Basophils Absolute <0.03 0.00 - 0.20 k/uL    Absolute Immature Granulocyte <0.03 0.00 - 0.30 k/uL      Other review of systems are as noted below. Preventative measures are reviewed today. See health maintenance section for complete preventative plan of care. Did review patient's med list, allergies, social history, fam history, pmhx and pshx today as noted in the record. Review of Systems   Constitutional:  Negative for chills, fatigue and fever. HENT:  Negative for congestion, ear pain, postnasal drip, rhinorrhea, sinus pressure, sore throat and trouble swallowing. Eyes:  Negative for discharge and redness. Respiratory:  Negative for cough, shortness of breath and wheezing. Cardiovascular:  Negative for chest pain.    Gastrointestinal:  Negative for abdominal pain, constipation, diarrhea, nausea and vomiting. Genitourinary:  Negative for dysuria, flank pain, frequency and urgency. Musculoskeletal:  Negative for arthralgias, myalgias and neck pain. Skin:  Negative for rash and wound. Allergic/Immunologic: Negative for environmental allergies. Neurological:  Negative for dizziness, weakness, light-headedness and headaches. Hematological:  Negative for adenopathy. Psychiatric/Behavioral: Negative. Prior to Visit Medications    Medication Sig Taking? Authorizing Provider   Multiple Vitamin (MULTI VITAMIN DAILY PO) Take by mouth  Historical Provider, MD   carvedilol (COREG) 3.125 MG tablet Take 1 tablet by mouth 2 times daily  Aileen Bautista,    levothyroxine (SYNTHROID) 112 MCG tablet Take 1 tablet by mouth daily  Mercedes Chatman DO   clopidogrel (PLAVIX) 75 MG tablet take 1 tablet by mouth once daily  Mercedes Chatman DO   atorvastatin (LIPITOR) 40 MG tablet take 1 tablet by mouth at bedtime  Mercedes Chatman DO   calcium carbonate 600 MG TABS tablet Take 1 tablet by mouth daily  Historical Provider, MD   Probiotic Product (PROBIOTIC DAILY PO) Take by mouth  Historical Provider, MD   aspirin 81 MG tablet Take 81 mg by mouth daily  Historical Provider, MD        Social History     Tobacco Use    Smoking status: Former     Packs/day: 2.00     Years: 25.00     Pack years: 50.00     Types: Cigarettes     Start date: 1973     Quit date: 2001     Years since quittin.1    Smokeless tobacco: Never    Tobacco comments:     quit in    Substance Use Topics    Alcohol use: Yes     Comment: socia, glass of wine at night        There were no vitals filed for this visit. Estimated body mass index is 25.4 kg/m² as calculated from the following:    Height as of 22: 5' 4\" (1.626 m). Weight as of 22: 148 lb (67.1 kg). Physical Exam  Vitals and nursing note reviewed.    Constitutional:       General: She is not in acute distress. Appearance: Normal appearance. She is well-developed. She is not diaphoretic. HENT:      Head: Normocephalic and atraumatic. Right Ear: Tympanic membrane, ear canal and external ear normal.      Left Ear: Tympanic membrane, ear canal and external ear normal.      Nose: Nose normal.      Mouth/Throat:      Mouth: Mucous membranes are moist.      Pharynx: Oropharynx is clear. No oropharyngeal exudate. Eyes:      General:         Right eye: No discharge. Left eye: No discharge. Conjunctiva/sclera: Conjunctivae normal.      Pupils: Pupils are equal, round, and reactive to light. Neck:      Thyroid: No thyromegaly. Cardiovascular:      Rate and Rhythm: Normal rate and regular rhythm. Heart sounds: Normal heart sounds. Pulmonary:      Effort: Pulmonary effort is normal.      Breath sounds: Normal breath sounds. No wheezing or rales. Abdominal:      General: Bowel sounds are normal. There is no distension. Palpations: Abdomen is soft. Tenderness: There is no abdominal tenderness. Musculoskeletal:      Cervical back: Normal range of motion and neck supple. Lymphadenopathy:      Cervical: No cervical adenopathy. Skin:     General: Skin is warm and dry. Findings: No rash. Neurological:      Mental Status: She is alert and oriented to person, place, and time. Psychiatric:         Behavior: Behavior normal.         Thought Content: Thought content normal.         Judgment: Judgment normal.         ASSESSMENT/PLAN:  Encounter Diagnoses   Name Primary? Impaired fasting glucose Yes    Mixed hyperlipidemia     Acquired hypothyroidism     Cerebrovascular accident (CVA) due to thrombosis of left vertebral artery ([facility] [address]     Encounter for screening mammogram for malignant neoplasm of breast      No orders of the defined types were placed in this encounter.   Orders Placed This Encounter   Procedures    [facility] DIGITAL SCREEN BILATERAL     Last 6/26/2020 Standing Status:   Future     Standing Expiration Date:   10/10/2023     Order Specific Question:   Reason for exam:     Answer:   screening mammogram    CBC with Auto Differential     Standing Status:   Future     Standing Expiration Date:   8/10/2023    Comprehensive Metabolic Panel     Standing Status:   Future     Standing Expiration Date:   8/10/2023    Hemoglobin A1C     Standing Status:   Future     Standing Expiration Date:   8/10/2023    Lipid Panel     Standing Status:   Future     Standing Expiration Date:   8/10/2023     Order Specific Question:   Is Patient Fasting?/# of Hours     Answer:   12 hours    TSH     Standing Status:   Future     Standing Expiration Date:   8/10/2023    T4, Free     Standing Status:   Future     Standing Expiration Date:   8/10/2023     Patient is to continue on her current medical therapy. No additional changes are made at this time. Patient is to continue to follow a low-carb/low sugar/low-fat diet and increase exercise for optimal blood sugar and cholesterol control. Patient is to return to my office in 6 months duration or sooner if any further problems or symptoms arise. (Please note that portions of this note were completed with a voice recognition program. Efforts were made to edit the dictations but occasionally words are mis-transcribed.)      No follow-ups on file. An electronic signature was used to authenticate this note.     --Terrell Brunner,  on 8/10/2022 at 7:27 AM

## 2022-08-10 NOTE — PATIENT INSTRUCTIONS
Hospital Outpatient Visit on 08/08/2022   Component Date Value Ref Range Status    Thyroxine, Free 08/08/2022 1.25  0.93 - 1.70 ng/dL Final    TSH 08/08/2022 1.79  0.30 - 5.00 uIU/mL Final    Cholesterol 08/08/2022 138  <200 mg/dL Final    Comment:    Cholesterol Guidelines:      <200  Desirable   200-240  Borderline      >240  Undesirable         HDL 08/08/2022 56  >40 mg/dL Final    Comment:    HDL Guidelines:    <40     Undesirable   40-59    Borderline    >59     Desirable         LDL Cholesterol 08/08/2022 58  0 - 130 mg/dL Final    Comment:    LDL Guidelines:     <100    Desirable   100-129   Near to/above Desirable   130-159   Borderline      >159   Undesirable     Direct (measured) LDL and calculated LDL are not interchangeable tests. Chol/HDL Ratio 08/08/2022 2.5  <5 Final            Triglycerides 08/08/2022 122  <150 mg/dL Final    Comment:    Triglyceride Guidelines:     <150   Desirable   150-199  Borderline   200-499  High     >499   Very high   Based on AHA Guidelines for fasting triglyceride, October 2012. Hemoglobin A1C 08/08/2022 5.5  4.0 - 6.0 % Final    Estimated Avg Glucose 08/08/2022 111  mg/dL Final    Comment: The ADA and AACC recommend providing the estimated average glucose result to permit better   patient understanding of their HBA1c result.       Glucose 08/08/2022 109 (A) 70 - 99 mg/dL Final    BUN 08/08/2022 16  8 - 23 mg/dL Final    Creatinine 08/08/2022 0.72  0.50 - 0.90 mg/dL Final    Bun/Cre Ratio 08/08/2022 22 (A) 9 - 20 Final    Calcium 08/08/2022 9.4  8.6 - 10.4 mg/dL Final    Sodium 08/08/2022 142  135 - 144 mmol/L Final    Potassium 08/08/2022 4.1  3.7 - 5.3 mmol/L Final    Chloride 08/08/2022 104  98 - 107 mmol/L Final    CO2 08/08/2022 29  20 - 31 mmol/L Final    Anion Gap 08/08/2022 9  9 - 17 mmol/L Final    Alkaline Phosphatase 08/08/2022 95  35 - 104 U/L Final    ALT 08/08/2022 21  5 - 33 U/L Final    AST 08/08/2022 19  <32 U/L Final    Total Bilirubin

## 2022-08-12 ENCOUNTER — HOSPITAL ENCOUNTER (OUTPATIENT)
Dept: MAMMOGRAPHY | Age: 67
Discharge: HOME | End: 2022-08-14
Payer: MEDICARE

## 2022-08-12 DIAGNOSIS — Z12.31 ENCOUNTER FOR SCREENING MAMMOGRAM FOR MALIGNANT NEOPLASM OF BREAST: ICD-10-CM

## 2022-08-12 PROCEDURE — 77063 BREAST TOMOSYNTHESIS BI: CPT

## 2022-08-24 ENCOUNTER — OFFICE VISIT (OUTPATIENT)
Dept: CARDIOLOGY | Age: 67
End: 2022-08-24
Payer: MEDICARE

## 2022-08-24 VITALS
BODY MASS INDEX: 25.23 KG/M2 | HEART RATE: 54 BPM | WEIGHT: 147.8 LBS | DIASTOLIC BLOOD PRESSURE: 68 MMHG | HEIGHT: 64 IN | SYSTOLIC BLOOD PRESSURE: 130 MMHG

## 2022-08-24 DIAGNOSIS — I35.1 AORTIC VALVE INSUFFICIENCY, ETIOLOGY OF CARDIAC VALVE DISEASE UNSPECIFIED: ICD-10-CM

## 2022-08-24 DIAGNOSIS — I63.012 CEREBROVASCULAR ACCIDENT (CVA) DUE TO THROMBOSIS OF LEFT VERTEBRAL ARTERY (HCC): Primary | ICD-10-CM

## 2022-08-24 DIAGNOSIS — I10 PRIMARY HYPERTENSION: ICD-10-CM

## 2022-08-24 DIAGNOSIS — E78.2 MIXED HYPERLIPIDEMIA: ICD-10-CM

## 2022-08-24 DIAGNOSIS — I34.0 MITRAL VALVE INSUFFICIENCY, UNSPECIFIED ETIOLOGY: ICD-10-CM

## 2022-08-24 PROCEDURE — 1036F TOBACCO NON-USER: CPT | Performed by: INTERNAL MEDICINE

## 2022-08-24 PROCEDURE — 1123F ACP DISCUSS/DSCN MKR DOCD: CPT | Performed by: INTERNAL MEDICINE

## 2022-08-24 PROCEDURE — G8400 PT W/DXA NO RESULTS DOC: HCPCS | Performed by: INTERNAL MEDICINE

## 2022-08-24 PROCEDURE — 99213 OFFICE O/P EST LOW 20 MIN: CPT | Performed by: INTERNAL MEDICINE

## 2022-08-24 PROCEDURE — 1090F PRES/ABSN URINE INCON ASSESS: CPT | Performed by: INTERNAL MEDICINE

## 2022-08-24 PROCEDURE — 99214 OFFICE O/P EST MOD 30 MIN: CPT | Performed by: INTERNAL MEDICINE

## 2022-08-24 PROCEDURE — G8417 CALC BMI ABV UP PARAM F/U: HCPCS | Performed by: INTERNAL MEDICINE

## 2022-08-24 PROCEDURE — G8427 DOCREV CUR MEDS BY ELIG CLIN: HCPCS | Performed by: INTERNAL MEDICINE

## 2022-08-24 PROCEDURE — 93005 ELECTROCARDIOGRAM TRACING: CPT | Performed by: INTERNAL MEDICINE

## 2022-08-24 PROCEDURE — 3017F COLORECTAL CA SCREEN DOC REV: CPT | Performed by: INTERNAL MEDICINE

## 2022-08-24 PROCEDURE — 93010 ELECTROCARDIOGRAM REPORT: CPT | Performed by: INTERNAL MEDICINE

## 2022-08-24 RX ORDER — LOSARTAN POTASSIUM 25 MG/1
12.5 TABLET ORAL DAILY
Qty: 30 TABLET | Refills: 5 | Status: SHIPPED | OUTPATIENT
Start: 2022-08-24

## 2022-08-24 NOTE — PROGRESS NOTES
Today's Date: 8/24/2022  Patient's Name: Richie Epstein  Patient's age: 69 y.o., 1955    Subjective:  Richie Epstein is being seen in clinic today regarding   Chief Complaint   Patient presents with    Cerebrovascular Accident    Hyperlipidemia         she is doing well from a cardiac standpoint. She has chronic stable dyspnea on exertion. She reports being active. She is working on a rental property. No chest pain, no PND, no syncope or pre-syncope, no orthopnea. Recent PCP apt- /74    Past Medical History:   has a past medical history of Cerebrovascular accident (CVA) due to occlusion of left vertebral artery ([address] Corneal neovascularization, Elevated fasting blood sugar, Hyperlipidemia, Hypothyroidism, IFG (impaired fasting glucose), Knee pain, Myopia with astigmatism, Neck pain, and Osteoarthritis. Past Surgical History:   has a past surgical history that includes Colonoscopy (05/2008); Insertable Cardiac Monitor (06/12/2017); Tonsillectomy; and pr colonoscopy flx dx w/collj spec when pfrmd (N/A, 7/11/2018). Home Medications:  Prior to Admission medications    Medication Sig Start Date End Date Taking?  Authorizing Provider   Multiple Vitamin (MULTI VITAMIN DAILY PO) Take by mouth   Yes Historical Provider, MD   carvedilol (COREG) 3.125 MG tablet Take 1 tablet by mouth 2 times daily 6/8/22  Yes Reggie Spencer DO   levothyroxine (SYNTHROID) 112 MCG tablet Take 1 tablet by mouth daily 2/9/22  Yes Reggie Spencer DO   clopidogrel (PLAVIX) 75 MG tablet take 1 tablet by mouth once daily 2/9/22  Yes Reggie Spencer DO   atorvastatin (LIPITOR) 40 MG tablet take 1 tablet by mouth at bedtime 2/9/22  Yes Reggie Spencer DO   calcium carbonate 600 MG TABS tablet Take 1 tablet by mouth daily   Yes Historical Provider, MD   Probiotic Product (PROBIOTIC DAILY PO) Take by mouth   Yes Historical Provider, MD   aspirin 81 MG tablet Take 81 mg by mouth daily   Yes Historical Provider, MD Allergies:  Patient has no known allergies. Social History:   reports that she quit smoking about 21 years ago. Her smoking use included cigarettes. She started smoking about 49 years ago. She has a 50.00 pack-year smoking history. She has never used smokeless tobacco. She reports current alcohol use. She reports that she does not use drugs. Family History: family history includes Cancer in her father; Heart Disease in her maternal grandfather, maternal grandmother, paternal grandfather, and paternal grandmother; Stroke in her maternal grandfather, maternal grandmother, paternal grandfather, and paternal grandmother; Thyroid Disease in her mother. No h/o sudden cardiac death. No for premature CAD    REVIEW OF SYSTEMS:    Constitutional: there has been no unanticipated weight loss. There's been No change in energy level, No change in activity level. Eyes: No visual changes or diplopia. No scleral icterus. ENT: No Headaches, hearing loss or vertigo. No mouth sores or sore throat. Cardiovascular: see above  Respiratory: see above  Gastrointestinal: No abdominal pain, appetite loss, blood in stools. Genitourinary: No dysuria, trouble voiding, or hematuria. Musculoskeletal:  No gait disturbance, No weakness or joint complaints. Integumentary: No rash or pruritis. Neurological: No headache or diplopia. No tingling  Psychiatric: No anxiety, or depression. Endocrine: No temperature intolerance. Hematologic/Lymphatic: No abnormal bruising or bleeding, blood clots or swollen lymph nodes. Allergic/Immunologic: No nasal congestion or hives. PHYSICAL EXAM:      BP (!) 147/65   Pulse 54   Ht 5' 4\" (1.626 m)   Wt 147 lb 12.8 oz (67 kg)   BMI 25.37 kg/m²    Constitutional and General Appearance: alert, cooperative, no distress and appears stated age  HEENT: PERRL, no cervical lymphadenopathy. No masses palpable.  Normal oral mucosa  Respiratory:  Normal excursion and expansion without use of accessory muscles  Resp Auscultation: Good respiratory effort. No for increased work of breathing. On auscultation: clear to auscultation bilaterally  Cardiovascular:  Heart tones are crisp and normal. regular S1 and S2. 2/6 systolic murmur  Jugular venous pulsation Normal  The carotid upstroke is normal in amplitude and contour without delay or bruit   Abdomen:   soft  Bowel sounds present  Extremities:   No edema  Neurological:  Alert and oriented. Cardiac Data:  EKG: NSR, NL ECG    Labs:     CBC: No results for input(s): WBC, HGB, HCT, PLT in the last 72 hours. BMP: No results for input(s): NA, K, CO2, BUN, CREATININE, LABGLOM, GLUCOSE in the last 72 hours. PT/INR: No results for input(s): PROTIME, INR in the last 72 hours. FASTING LIPID PANEL:  Lab Results   Component Value Date/Time    HDL 56 08/08/2022 08:54 AM    TRIG 122 08/08/2022 08:54 AM     LIVER PROFILE:No results for input(s): AST, ALT, LABALBU in the last 72 hours. Problem List:  Patient Active Problem List   Diagnosis    Hypothyroidism    Impaired fasting glucose    Mixed hyperlipidemia    Gait abnormality    Cerebrovascular accident (CVA) due to thrombosis of left vertebral artery ([address]        TTE 01/14/2020  Summary  Normal left ventricular diameter. Left ventricular systolic function is normal.  Left ventricular ejection fraction 69 %. Grade II (moderate) left ventricular diastolic dysfunction. Left atrium is mildly dilated. Aortic leaflet calcification with mild stenosis. Peak instantaneous gradient 21 mmHg and mean gradient 12 mmHg. Mild aortic insufficiency. Normal mitral valve leaflets. Mild mitral regurgitation. Normal tricuspid valve leaflets. Mild tricuspid regurgitation. Estimated right ventricular systolic pressure is 38 mmHg. No significant pericardial effusion is seen. TTE 01/26/2021     Summary  Normal left ventricular diameter.   Left ventricular systolic function is normal.  Left ventricular ejection fraction 55 %. Grade I (mild) left ventricular diastolic dysfunction. Left atrial size is at the upper limits of normal.  Aortic valve is sclerotic but opens well. Mild to moderate aortic insufficiency. Mild mitral valve thickening with annular calcification. Moderate mitral regurgitation. Normal tricuspid valve leaflets. Mild to moderate tricuspid regurgitation. Estimated right ventricular systolic pressure is 32 mmHg. No significant pericardial effusion is seen. Stress test 2/15/21  IMPRESSION:  1. No ischemia or infarct. 2. Left ventricular ejection fraction 69%, with normal wall motion. Carotid ultrasound 2/15/21  Negative bilateral carotid with no hemodynamically significant stenosis. TTE 5/5/22  Summary  Left ventricular systolic function is normal.  Left ventricular ejection fraction 60 %. Grade II (moderate) left ventricular diastolic dysfunction. Aortic valve sclerosis without stenosis. Mild to moderate aortic insufficiency (pressure half time 468 msec)  Mild to moderate mitral regurgitation. Mild tricuspid regurgitation. Estimated right ventricular systolic pressure is 37 mmHg. No significant pericardial effusion is seen. Assessment and plan:     -Dyspnea on exertion- Treadmill cardiolite stress test was negative for ischemia on 2/15/21.   -Mild to moderate AI and mild to moderate MR- stable on TTE 5/5/22. -CVA 2017- on ASA/plavix. Has loop recorder. Battery depleted 9/24/21 interrogation.    -Negative carotid u/s 2/15/21  -Hyperlipidemia- Stable. Continue statin. LDL 56 on 1/31/22  -Hypothyroidism- on replacement.  -HTN. Patient to monitor BP at home. Continue Coreg. Stable. Add losartan 12.5mg po qday. Side effects discussed. -RTC 6 months.     Sosa Vu [id number]  [phone]

## 2022-09-07 DIAGNOSIS — I63.012 CEREBROVASCULAR ACCIDENT (CVA) DUE TO THROMBOSIS OF LEFT VERTEBRAL ARTERY (HCC): ICD-10-CM

## 2022-09-07 DIAGNOSIS — E03.9 ACQUIRED HYPOTHYROIDISM: ICD-10-CM

## 2022-09-08 RX ORDER — CLOPIDOGREL BISULFATE 75 MG/1
TABLET ORAL
Qty: 90 TABLET | Refills: 1 | Status: SHIPPED | OUTPATIENT
Start: 2022-09-08

## 2022-09-08 RX ORDER — LEVOTHYROXINE SODIUM 112 UG/1
TABLET ORAL
Qty: 90 TABLET | Refills: 1 | Status: SHIPPED | OUTPATIENT
Start: 2022-09-08

## 2022-09-08 NOTE — TELEPHONE ENCOUNTER
Migue Like called requesting a refill of the below medication which has been pended for you:     Requested Prescriptions     Pending Prescriptions Disp Refills    clopidogrel (PLAVIX) 75 MG tablet [Pharmacy Med Name: CLOPIDOGREL 75 MG TABLET] 90 tablet 1     Sig: TAKE ONE TABLET BY MOUTH DAILY    levothyroxine (SYNTHROID) 112 MCG tablet [Pharmacy Med Name: LEVOTHYROXINE 112 MCG TABLET] 90 tablet 1     Sig: TAKE ONE TABLET BY MOUTH DAILY       Last Appointment Date: 8/10/2022  Next Appointment Date: 2/10/2023    No Known Allergies

## 2022-11-22 DIAGNOSIS — E78.2 MIXED HYPERLIPIDEMIA: ICD-10-CM

## 2022-11-22 RX ORDER — ATORVASTATIN CALCIUM 40 MG/1
TABLET, FILM COATED ORAL
Qty: 90 TABLET | Refills: 1 | Status: SHIPPED | OUTPATIENT
Start: 2022-11-22

## 2022-11-22 NOTE — TELEPHONE ENCOUNTER
Nivia Marin called requesting a refill of the below medication which has been pended for you:     Requested Prescriptions     Pending Prescriptions Disp Refills    atorvastatin (LIPITOR) 40 MG tablet [Pharmacy Med Name: ATORVASTATIN 40 MG TABLET] 90 tablet 1     Sig: TAKE ONE TABLET BY MOUTH EVERY NIGHT AT BEDTIME       Last Appointment Date: 8/10/2022  Next Appointment Date: 2/10/2023    No Known Allergies

## 2022-12-20 ENCOUNTER — OFFICE VISIT (OUTPATIENT)
Dept: PRIMARY CARE CLINIC | Age: 67
End: 2022-12-20
Payer: MEDICARE

## 2022-12-20 VITALS
RESPIRATION RATE: 18 BRPM | HEIGHT: 64 IN | DIASTOLIC BLOOD PRESSURE: 70 MMHG | TEMPERATURE: 101.9 F | BODY MASS INDEX: 25.57 KG/M2 | HEART RATE: 104 BPM | WEIGHT: 149.8 LBS | OXYGEN SATURATION: 95 % | SYSTOLIC BLOOD PRESSURE: 156 MMHG

## 2022-12-20 DIAGNOSIS — R05.1 ACUTE COUGH: ICD-10-CM

## 2022-12-20 DIAGNOSIS — R50.9 FEVER, UNSPECIFIED FEVER CAUSE: ICD-10-CM

## 2022-12-20 DIAGNOSIS — J06.9 VIRAL UPPER RESPIRATORY ILLNESS: Primary | ICD-10-CM

## 2022-12-20 LAB
INFLUENZA A ANTIGEN, POC: NEGATIVE
INFLUENZA B ANTIGEN, POC: NEGATIVE
LOT EXPIRE DATE: NORMAL
LOT KIT NUMBER: NORMAL
SARS-COV-2, POC: NORMAL
VALID INTERNAL CONTROL: NORMAL
VENDOR AND KIT NAME POC: NORMAL

## 2022-12-20 PROCEDURE — 87428 SARSCOV & INF VIR A&B AG IA: CPT | Performed by: NURSE PRACTITIONER

## 2022-12-20 PROCEDURE — 1036F TOBACCO NON-USER: CPT | Performed by: NURSE PRACTITIONER

## 2022-12-20 PROCEDURE — 3017F COLORECTAL CA SCREEN DOC REV: CPT | Performed by: NURSE PRACTITIONER

## 2022-12-20 PROCEDURE — G8417 CALC BMI ABV UP PARAM F/U: HCPCS | Performed by: NURSE PRACTITIONER

## 2022-12-20 PROCEDURE — 1123F ACP DISCUSS/DSCN MKR DOCD: CPT | Performed by: NURSE PRACTITIONER

## 2022-12-20 PROCEDURE — PBSHW POCT COVID-19 & INFLUENZA A/B: Performed by: NURSE PRACTITIONER

## 2022-12-20 PROCEDURE — G8484 FLU IMMUNIZE NO ADMIN: HCPCS | Performed by: NURSE PRACTITIONER

## 2022-12-20 PROCEDURE — 99212 OFFICE O/P EST SF 10 MIN: CPT | Performed by: NURSE PRACTITIONER

## 2022-12-20 PROCEDURE — G8400 PT W/DXA NO RESULTS DOC: HCPCS | Performed by: NURSE PRACTITIONER

## 2022-12-20 PROCEDURE — 99213 OFFICE O/P EST LOW 20 MIN: CPT | Performed by: NURSE PRACTITIONER

## 2022-12-20 PROCEDURE — G8427 DOCREV CUR MEDS BY ELIG CLIN: HCPCS | Performed by: NURSE PRACTITIONER

## 2022-12-20 PROCEDURE — 1090F PRES/ABSN URINE INCON ASSESS: CPT | Performed by: NURSE PRACTITIONER

## 2022-12-20 ASSESSMENT — ENCOUNTER SYMPTOMS
COUGH: 1
SORE THROAT: 1
RHINORRHEA: 1
SINUS PRESSURE: 1
GASTROINTESTINAL NEGATIVE: 1

## 2022-12-20 NOTE — PATIENT INSTRUCTIONS
Recommended over the counter medications/treatments: The use of an antihistamine (Claritin or Zyrtec) and a nasal steroid spray (Flonase or Nasacort) may help with sinus congestion and drainage. Mucinex will also help thin secretions and improve congestion. Works best if drinking plenty of water. Honey with or without Lemon may also help with coughing. Probiotics daily may help boost immune system. Alternating hot liquids with cold liquids helps to sooth sore throat  Use Acetaminophen (Tylenol) to help relieve fever, chills or body aches. Take one extra strength tylenol. Do not exceed 3,000mg of Tylenol in a 24 hour time period. Make sure to stay well hydrated by drinking plenty of water. Follow up with primary care provider in 1 to 2 days or as needed if no improvement or worsening of your symptoms.

## 2022-12-20 NOTE — PROGRESS NOTES
[address] [facility] A department of [facility] 99  Phone: [phone]  Fax: [phone]      Joseph Laughlin is a 69 y.o. female who presents to the [address] [facility] or [facility] clinic today for her medical conditions/complaints as noted below. Joseph Laughlin is c/o of Cough (Started on 2022- (was sick last month and got better) - Started feeling sick again and having respiratory symptom the last couple of day- fever, chills, persistent cough, pt states SOB and wheezing at time, body aches, headache.)      Assessment and Plan     1. Viral upper respiratory illness  Discussed viral illness with patient and appropriate timing and use of antibiotics. He was encouraged to try symptomatic supportive therapies. Office Visit on 2022   Component Date Value Ref Range Status    VALID INTERNAL CONTROL 2022 pass   Final    Lot/Kit Number [id number]   Final    Lot/Kit  date: 2022   Final    SARS-COV-2, POC 2022 Not-Detected  Not Detected Final    Influenza A Antigen, POC 2022 Negative  Negative Final    Influenza B Antigen, POC 2022 Negative  Negative Final    Vendor and kit name 2022 Veritor   Final         2. Fever, unspecified fever cause  Encouraged to take tylenol for fever or pain. Advised that max dose is 3,000mg in 24 hours. She should avoid NSAIDs.  - POCT COVID-19 & Influenza A/B    3. Acute cough  Increase fluids and continue tea with honey. Offered tessalon but she declined. Advised that if symptoms not improved in 1-2 days she may be retested for COVID but isolation period should be over Friday this week. Recommended over the counter medications/treatments: The use of an antihistamine (Claritin or Zyrtec) and a nasal steroid spray (Flonase or Nasacort) may help with sinus congestion and drainage. Mucinex will also help thin secretions and improve congestion.  Works best if drinking plenty of water. Honey with or without Lemon may also help with coughing. Probiotics daily may help boost immune system. Alternating hot liquids with cold liquids helps to sooth sore throat  Use Acetaminophen (Tylenol) to help relieve fever, chills or body aches. Do not exceed 3,000mg of Tylenol in a 24 hour time period. Make sure to stay well hydrated by drinking plenty of water. Follow up with primary care provider in 1 to 2 days or as needed if no improvement or worsening of your symptoms. Subjective:   Cough  This is a new problem. The current episode started in the past 7 days (12/18/2022). The problem occurs every few hours (worse in the morning. ). Cough characteristics: Occassionally productive in the morning and yellowish. Associated symptoms include a fever (102), postnasal drip, rhinorrhea and a sore throat. Nothing aggravates the symptoms. Treatments tried: Increased fluids. Hot tea. Honey. The treatment provided no relief. There is no history of asthma, bronchitis or COPD. Review of Systems   Constitutional:  Positive for appetite change and fever (102). HENT:  Positive for postnasal drip, rhinorrhea, sinus pressure and sore throat. Respiratory:  Positive for cough. Gastrointestinal: Negative. Genitourinary: Negative. Musculoskeletal: Negative. Skin: Negative. Neurological: Negative. Hematological: Negative. Psychiatric/Behavioral: Negative. All other systems reviewed and are negative. Past Medical History:   Past Medical History:   Diagnosis Date    Cerebrovascular accident (CVA) due to occlusion of left vertebral artery ([facility] [address] 02/11/2017    Left medulary stroke.   residual effects:  mild ataxia, left sided paresthesias    Corneal neovascularization 2001    Elevated fasting blood sugar     Hyperlipidemia     Hypothyroidism     IFG (impaired fasting glucose)     Knee pain 1996    Myopia with astigmatism 2001    Neck pain     intermittent Osteoarthritis         Past Surgical History:  has a past surgical history that includes Colonoscopy (05/2008); Insertable Cardiac Monitor (06/12/2017); Tonsillectomy; and pr colonoscopy flx dx w/collj spec when pfrmd (N/A, 7/11/2018). Allergies: No Known Allergies    Social History:  reports that she quit smoking about 21 years ago. Her smoking use included cigarettes. She started smoking about 50 years ago. She has a 50.00 pack-year smoking history. She has never used smokeless tobacco. She reports current alcohol use. She reports that she does not use drugs. Objective:     Vitals:    12/20/22 1027 12/20/22 1034   BP: (!) 156/74 (!) 156/70   Site: Right Upper Arm Left Upper Arm   Position: Sitting Sitting   Cuff Size: Medium Adult Medium Adult   Pulse: (!) 104    Resp: 18    Temp: (!) 101.9 °F (38.8 °C)    TempSrc: Tympanic    SpO2: 95%    Weight: 149 lb 12.8 oz (67.9 kg)    Height: 5' 4\" (1.626 m)      Body mass index is 25.71 kg/m². BP (!) 156/70 (Site: Left Upper Arm, Position: Sitting, Cuff Size: Medium Adult) Comment: Provider notified  Pulse (!) 104 Comment: pt. having coughing spells  Temp (!) 101.9 °F (38.8 °C) (Tympanic)   Resp 18   Ht 5' 4\" (1.626 m)   Wt 149 lb 12.8 oz (67.9 kg)   SpO2 95%   BMI 25.71 kg/m²   Physical Exam  Vitals and nursing note reviewed. Constitutional:       General: She is not in acute distress. Appearance: She is ill-appearing. HENT:      Right Ear: Hearing, tympanic membrane, ear canal and external ear normal. There is no impacted cerumen. Left Ear: Hearing, tympanic membrane, ear canal and external ear normal. There is no impacted cerumen. Nose: Mucosal edema, congestion and rhinorrhea present. Right Turbinates: Swollen. Left Turbinates: Swollen. Mouth/Throat:      Lips: Pink. Mouth: Mucous membranes are moist.      Pharynx: Uvula midline. Posterior oropharyngeal erythema present. Tonsils: No tonsillar abscesses. Comments: Moderate amount of mucus noted in the pharynx. Eyes:      Conjunctiva/sclera: Conjunctivae normal.      Pupils: Pupils are equal, round, and reactive to light. Cardiovascular:      Rate and Rhythm: Normal rate and regular rhythm. Pulses: Normal pulses. Heart sounds: Normal heart sounds. Pulmonary:      Effort: Pulmonary effort is normal. No respiratory distress. Breath sounds: Normal breath sounds. No decreased air movement. No decreased breath sounds, wheezing, rhonchi or rales. Musculoskeletal:         General: Normal range of motion. Cervical back: Normal range of motion. Lymphadenopathy:      Cervical: No cervical adenopathy. Right cervical: No superficial or posterior cervical adenopathy. Left cervical: No superficial or posterior cervical adenopathy. Upper Body:      Right upper body: No supraclavicular adenopathy. Left upper body: No supraclavicular adenopathy. Skin:     General: Skin is warm and dry. Capillary Refill: Capillary refill takes less than 2 seconds. Neurological:      General: No focal deficit present. Mental Status: She is alert and oriented to person, place, and time. Mental status is at baseline. Psychiatric:         Mood and Affect: Mood normal.         Behavior: Behavior normal.       Discussed exam, POCT findings, plan of care, and follow-up at length with patient and/or their caregiver. Reviewed all prescribed and recommended medications, administration and side effects. Encouraged patient to follow up with PCP or return to the clinic for no improvement and or worsening of symptoms. All questions were addressed and answered with verbalization of understanding. The patient and/or the caregiver was agreeable with the plan.      Electronically signed by NICKY Fallon CNP on 12/20/2022 at 10:58 AM

## 2023-01-02 ENCOUNTER — OFFICE VISIT (OUTPATIENT)
Dept: PRIMARY CARE CLINIC | Age: 68
End: 2023-01-02
Payer: MEDICARE

## 2023-01-02 VITALS
RESPIRATION RATE: 20 BRPM | HEART RATE: 107 BPM | HEIGHT: 64 IN | OXYGEN SATURATION: 98 % | DIASTOLIC BLOOD PRESSURE: 76 MMHG | TEMPERATURE: 98.1 F | BODY MASS INDEX: 25.12 KG/M2 | WEIGHT: 147.13 LBS | SYSTOLIC BLOOD PRESSURE: 124 MMHG

## 2023-01-02 DIAGNOSIS — J01.40 ACUTE NON-RECURRENT PANSINUSITIS: Primary | ICD-10-CM

## 2023-01-02 DIAGNOSIS — R05.1 ACUTE COUGH: ICD-10-CM

## 2023-01-02 PROCEDURE — 1036F TOBACCO NON-USER: CPT | Performed by: NURSE PRACTITIONER

## 2023-01-02 PROCEDURE — 99212 OFFICE O/P EST SF 10 MIN: CPT | Performed by: NURSE PRACTITIONER

## 2023-01-02 PROCEDURE — 3017F COLORECTAL CA SCREEN DOC REV: CPT | Performed by: NURSE PRACTITIONER

## 2023-01-02 PROCEDURE — G8400 PT W/DXA NO RESULTS DOC: HCPCS | Performed by: NURSE PRACTITIONER

## 2023-01-02 PROCEDURE — G8427 DOCREV CUR MEDS BY ELIG CLIN: HCPCS | Performed by: NURSE PRACTITIONER

## 2023-01-02 PROCEDURE — 99213 OFFICE O/P EST LOW 20 MIN: CPT | Performed by: NURSE PRACTITIONER

## 2023-01-02 PROCEDURE — 1090F PRES/ABSN URINE INCON ASSESS: CPT | Performed by: NURSE PRACTITIONER

## 2023-01-02 PROCEDURE — G8417 CALC BMI ABV UP PARAM F/U: HCPCS | Performed by: NURSE PRACTITIONER

## 2023-01-02 PROCEDURE — G8484 FLU IMMUNIZE NO ADMIN: HCPCS | Performed by: NURSE PRACTITIONER

## 2023-01-02 PROCEDURE — 1123F ACP DISCUSS/DSCN MKR DOCD: CPT | Performed by: NURSE PRACTITIONER

## 2023-01-02 RX ORDER — BENZONATATE 100 MG/1
100 CAPSULE ORAL 3 TIMES DAILY PRN
Qty: 30 CAPSULE | Refills: 0 | Status: SHIPPED | OUTPATIENT
Start: 2023-01-02 | End: 2023-01-12

## 2023-01-02 RX ORDER — DOXYCYCLINE HYCLATE 100 MG
100 TABLET ORAL 2 TIMES DAILY
Qty: 20 TABLET | Refills: 0 | Status: SHIPPED | OUTPATIENT
Start: 2023-01-02 | End: 2023-01-12

## 2023-01-02 ASSESSMENT — PATIENT HEALTH QUESTIONNAIRE - PHQ9
SUM OF ALL RESPONSES TO PHQ9 QUESTIONS 1 & 2: 0
1. LITTLE INTEREST OR PLEASURE IN DOING THINGS: 0
SUM OF ALL RESPONSES TO PHQ QUESTIONS 1-9: 0
2. FEELING DOWN, DEPRESSED OR HOPELESS: 0
SUM OF ALL RESPONSES TO PHQ QUESTIONS 1-9: 0

## 2023-01-02 ASSESSMENT — ENCOUNTER SYMPTOMS
SORE THROAT: 1
WHEEZING: 0
SHORTNESS OF BREATH: 0
CHEST TIGHTNESS: 0
COUGH: 1
RHINORRHEA: 0

## 2023-01-02 NOTE — PROGRESS NOTES
[facility]             [address], [address]                        Telephone [phone]             Fax [phone]     Loren Leary  1955  RFR:[id number]   Date of visit:  1/2/2023    Subjective:    Loren Leary is a 69 y.o.  female who presents to [facility] today (1/2/2023) for evaluation of:    Chief Complaint   Patient presents with    Cough     Here 12/20, not any better. Still coughing, more congested, some bloody sputum/mostly clear. Keeps her up at night. Head pressure, chest heaviness as well. Cough  This is a new problem. The current episode started 1 to 4 weeks ago (12/18/22). The problem has been gradually worsening. The problem occurs every few minutes. The cough is Productive of sputum (intermittent). Associated symptoms include nasal congestion (yellow mucus), postnasal drip and a sore throat (mild). Pertinent negatives include no chest pain, chills, fever, headaches, rash, rhinorrhea, shortness of breath or wheezing. Nothing aggravates the symptoms. She has tried OTC cough suppressant (robitussin, zyrtec) for the symptoms. The treatment provided no relief.      She has the following problem list:  Patient Active Problem List   Diagnosis    Hypothyroidism    Impaired fasting glucose    Mixed hyperlipidemia    Gait abnormality    Cerebrovascular accident (CVA) due to thrombosis of left vertebral artery (HCC)        Current medications are:  Current Outpatient Medications   Medication Sig Dispense Refill    benzonatate (TESSALON) 100 MG capsule Take 1 capsule by mouth 3 times daily as needed for Cough 30 capsule 0    doxycycline hyclate (VIBRA-TABS) 100 MG tablet Take 1 tablet by mouth 2 times daily for 10 days 20 tablet 0    atorvastatin (LIPITOR) 40 MG tablet TAKE ONE TABLET BY MOUTH EVERY NIGHT AT BEDTIME 90 tablet 1    clopidogrel (PLAVIX) 75 MG tablet TAKE ONE TABLET BY MOUTH DAILY 90 tablet 1    levothyroxine (SYNTHROID) 112 MCG tablet TAKE ONE TABLET BY MOUTH DAILY 90 tablet 1    losartan (COZAAR) 25 MG tablet Take 0.5 tablets by mouth daily 30 tablet 5    Multiple Vitamin (MULTI VITAMIN DAILY PO) Take by mouth      carvedilol (COREG) 3.125 MG tablet Take 1 tablet by mouth 2 times daily 180 tablet 1    calcium carbonate 600 MG TABS tablet Take 1 tablet by mouth daily      Probiotic Product (PROBIOTIC DAILY PO) Take by mouth      aspirin 81 MG tablet Take 81 mg by mouth daily       No current facility-administered medications for this visit. She has No Known Allergies. .    She  reports that she quit smoking about 21 years ago. Her smoking use included cigarettes. She started smoking about 50 years ago. She has a 50.00 pack-year smoking history. She has never used smokeless tobacco.      Objective:    Vitals:    01/02/23 1027   BP: 124/76   Site: Left Upper Arm   Position: Sitting   Cuff Size: Medium Adult   Pulse: (!) 107   Resp: 20   Temp: 98.1 °F (36.7 °C)   TempSrc: Tympanic   SpO2: 98%   Weight: 147 lb 2 oz (66.7 kg)   Height: 5' 4\" (1.626 m)     Body mass index is 25.25 kg/m². Review of Systems   Constitutional: Negative. Negative for chills and fever. HENT:  Positive for congestion, postnasal drip and sore throat (mild). Negative for rhinorrhea. Respiratory:  Positive for cough. Negative for chest tightness, shortness of breath and wheezing. Cardiovascular: Negative. Negative for chest pain. Skin:  Negative for rash. Neurological:  Negative for headaches. Physical Exam  Vitals and nursing note reviewed. Constitutional:       Appearance: Normal appearance. She is well-developed. HENT:      Head: Normocephalic. Jaw: There is normal jaw occlusion. Right Ear: Tympanic membrane, ear canal and external ear normal.      Left Ear: Tympanic membrane, ear canal and external ear normal.      Nose: Congestion present.       Right Turbinates: Swollen. Left Turbinates: Swollen. Right Sinus: Maxillary sinus tenderness and frontal sinus tenderness present. Left Sinus: Maxillary sinus tenderness and frontal sinus tenderness present. Mouth/Throat:      Lips: Pink. Mouth: Mucous membranes are moist.      Pharynx: Oropharynx is clear. Uvula midline. Posterior oropharyngeal erythema (light) present. Tonsils: 0 on the right. 0 on the left. Eyes:      Pupils: Pupils are equal, round, and reactive to light. Cardiovascular:      Rate and Rhythm: Regular rhythm. Tachycardia present. Heart sounds: Normal heart sounds. Pulmonary:      Effort: Pulmonary effort is normal.      Breath sounds: Normal breath sounds and air entry. Comments: Dry cough noted  Musculoskeletal:      Cervical back: Normal range of motion and neck supple. Lymphadenopathy:      Cervical: No cervical adenopathy. Skin:     General: Skin is warm and dry. Neurological:      General: No focal deficit present. Mental Status: She is alert and oriented to person, place, and time. Psychiatric:         Behavior: Behavior normal.         Thought Content: Thought content normal.       Assessment and Plan:    No results found for this visit on 01/02/23. Diagnosis Orders   1. Acute non-recurrent pansinusitis  doxycycline hyclate (VIBRA-TABS) 100 MG tablet      2. Acute cough  benzonatate (TESSALON) 100 MG capsule        Complete full course of antibiotic. Take Tessalon as directed for cough. I also recommended Flonase for sinus symptoms. she was also encouraged to use tylenol or ibuprofen for pain/fever. Increase water intake. Use cool mist humidifier at bedtime. Use nasal saline flush as needed. Good hand hygiene. she was instructed to return if there is no improvement or symptoms worsen. The use, risks, benefits, and side effects of prescribed or recommended medications were discussed.  All questions were answered and the patient/caregiver voiced understanding. No orders of the defined types were placed in this encounter.         Electronically signed by NICKY Avery CNP on 1/2/23 at 10:32 AM EST

## 2023-01-06 RX ORDER — CARVEDILOL 3.12 MG/1
TABLET ORAL
Qty: 180 TABLET | Refills: 1 | Status: SHIPPED | OUTPATIENT
Start: 2023-01-06

## 2023-01-06 NOTE — TELEPHONE ENCOUNTER
Sonja Rodriguez called requesting a refill of the below medication which has been pended for you:     Requested Prescriptions     Pending Prescriptions Disp Refills    carvedilol (COREG) 3.125 MG tablet [Pharmacy Med Name: CARVEDILOL 3.125 MG TABLET] 180 tablet 1     Sig: TAKE ONE TABLET BY MOUTH TWICE A DAY       Last Appointment Date: 8/10/2022  Next Appointment Date: 2/10/2023    No Known Allergies

## 2023-01-30 ENCOUNTER — HOSPITAL ENCOUNTER (OUTPATIENT)
Age: 68
Discharge: HOME | End: 2023-01-30
Payer: MEDICARE

## 2023-01-30 ENCOUNTER — OFFICE VISIT (OUTPATIENT)
Dept: FAMILY MEDICINE CLINIC | Age: 68
End: 2023-01-30
Payer: MEDICARE

## 2023-01-30 VITALS
HEART RATE: 88 BPM | HEIGHT: 64 IN | SYSTOLIC BLOOD PRESSURE: 130 MMHG | TEMPERATURE: 97.1 F | BODY MASS INDEX: 25.1 KG/M2 | RESPIRATION RATE: 16 BRPM | WEIGHT: 147 LBS | DIASTOLIC BLOOD PRESSURE: 80 MMHG | OXYGEN SATURATION: 98 %

## 2023-01-30 DIAGNOSIS — E03.9 ACQUIRED HYPOTHYROIDISM: ICD-10-CM

## 2023-01-30 DIAGNOSIS — R73.01 IMPAIRED FASTING GLUCOSE: ICD-10-CM

## 2023-01-30 DIAGNOSIS — Z12.31 SCREENING MAMMOGRAM FOR BREAST CANCER: ICD-10-CM

## 2023-01-30 DIAGNOSIS — R73.01 IMPAIRED FASTING GLUCOSE: Primary | ICD-10-CM

## 2023-01-30 DIAGNOSIS — R19.7 DIARRHEA, UNSPECIFIED TYPE: ICD-10-CM

## 2023-01-30 DIAGNOSIS — E78.2 MIXED HYPERLIPIDEMIA: ICD-10-CM

## 2023-01-30 LAB
ABSOLUTE EOS #: 0.18 K/UL (ref 0–0.44)
ABSOLUTE IMMATURE GRANULOCYTE: <0.03 K/UL (ref 0–0.3)
ABSOLUTE LYMPH #: 2.26 K/UL (ref 1.1–3.7)
ABSOLUTE MONO #: 0.46 K/UL (ref 0.1–1.2)
ALBUMIN SERPL-MCNC: 4.6 G/DL (ref 3.5–5.2)
ALBUMIN/GLOBULIN RATIO: 1.8 (ref 1–2.5)
ALP BLD-CCNC: 115 U/L (ref 35–104)
ALT SERPL-CCNC: 21 U/L (ref 5–33)
ANION GAP SERPL CALCULATED.3IONS-SCNC: 9 MMOL/L (ref 9–17)
AST SERPL-CCNC: 18 U/L
BASOPHILS # BLD: 1 % (ref 0–2)
BASOPHILS ABSOLUTE: 0.04 K/UL (ref 0–0.2)
BILIRUB SERPL-MCNC: 0.6 MG/DL (ref 0.3–1.2)
BUN BLDV-MCNC: 16 MG/DL (ref 8–23)
BUN/CREAT BLD: 22 (ref 9–20)
CALCIUM SERPL-MCNC: 9.8 MG/DL (ref 8.6–10.4)
CHLORIDE BLD-SCNC: 105 MMOL/L (ref 98–107)
CHOLESTEROL/HDL RATIO: 2.6
CHOLESTEROL: 145 MG/DL
CO2: 28 MMOL/L (ref 20–31)
CREAT SERPL-MCNC: 0.72 MG/DL (ref 0.5–0.9)
EOSINOPHILS RELATIVE PERCENT: 3 % (ref 1–4)
ESTIMATED AVERAGE GLUCOSE: 108 MG/DL
GFR SERPL CREATININE-BSD FRML MDRD: >60 ML/MIN/1.73M2
GLUCOSE BLD-MCNC: 115 MG/DL (ref 70–99)
HBA1C MFR BLD: 5.4 % (ref 4–6)
HCT VFR BLD CALC: 41.8 % (ref 36.3–47.1)
HDLC SERPL-MCNC: 55 MG/DL
HEMOGLOBIN: 14 G/DL (ref 11.9–15.1)
IMMATURE GRANULOCYTES: 0 %
LDL CHOLESTEROL: 57 MG/DL (ref 0–130)
LYMPHOCYTES # BLD: 43 % (ref 24–43)
MCH RBC QN AUTO: 31.7 PG (ref 25.2–33.5)
MCHC RBC AUTO-ENTMCNC: 33.5 G/DL (ref 25.2–33.5)
MCV RBC AUTO: 94.6 FL (ref 82.6–102.9)
MONOCYTES # BLD: 9 % (ref 3–12)
NRBC AUTOMATED: 0 PER 100 WBC
PDW BLD-RTO: 12.2 % (ref 11.8–14.4)
PLATELET # BLD: 231 K/UL (ref 138–453)
PMV BLD AUTO: 9.6 FL (ref 8.1–13.5)
POTASSIUM SERPL-SCNC: 4.6 MMOL/L (ref 3.7–5.3)
RBC # BLD: 4.42 M/UL (ref 3.95–5.11)
SEG NEUTROPHILS: 44 % (ref 36–65)
SEGMENTED NEUTROPHILS ABSOLUTE COUNT: 2.34 K/UL (ref 1.5–8.1)
SODIUM BLD-SCNC: 142 MMOL/L (ref 135–144)
THYROXINE, FREE: 1.25 NG/DL (ref 0.93–1.7)
TOTAL PROTEIN: 7.1 G/DL (ref 6.4–8.3)
TRIGL SERPL-MCNC: 163 MG/DL
TSH SERPL DL<=0.05 MIU/L-ACNC: 2.56 UIU/ML (ref 0.3–5)
WBC # BLD: 5.3 K/UL (ref 3.5–11.3)

## 2023-01-30 PROCEDURE — 83036 HEMOGLOBIN GLYCOSYLATED A1C: CPT

## 2023-01-30 PROCEDURE — 80061 LIPID PANEL: CPT

## 2023-01-30 PROCEDURE — 84443 ASSAY THYROID STIM HORMONE: CPT

## 2023-01-30 PROCEDURE — 85025 COMPLETE CBC W/AUTO DIFF WBC: CPT

## 2023-01-30 PROCEDURE — 1036F TOBACCO NON-USER: CPT | Performed by: FAMILY MEDICINE

## 2023-01-30 PROCEDURE — G8400 PT W/DXA NO RESULTS DOC: HCPCS | Performed by: FAMILY MEDICINE

## 2023-01-30 PROCEDURE — G8427 DOCREV CUR MEDS BY ELIG CLIN: HCPCS | Performed by: FAMILY MEDICINE

## 2023-01-30 PROCEDURE — 1090F PRES/ABSN URINE INCON ASSESS: CPT | Performed by: FAMILY MEDICINE

## 2023-01-30 PROCEDURE — 3017F COLORECTAL CA SCREEN DOC REV: CPT | Performed by: FAMILY MEDICINE

## 2023-01-30 PROCEDURE — 99214 OFFICE O/P EST MOD 30 MIN: CPT | Performed by: FAMILY MEDICINE

## 2023-01-30 PROCEDURE — G8417 CALC BMI ABV UP PARAM F/U: HCPCS | Performed by: FAMILY MEDICINE

## 2023-01-30 PROCEDURE — 36415 COLL VENOUS BLD VENIPUNCTURE: CPT

## 2023-01-30 PROCEDURE — 1123F ACP DISCUSS/DSCN MKR DOCD: CPT | Performed by: FAMILY MEDICINE

## 2023-01-30 PROCEDURE — 80053 COMPREHEN METABOLIC PANEL: CPT

## 2023-01-30 PROCEDURE — G8484 FLU IMMUNIZE NO ADMIN: HCPCS | Performed by: FAMILY MEDICINE

## 2023-01-30 PROCEDURE — 84439 ASSAY OF FREE THYROXINE: CPT

## 2023-01-30 PROCEDURE — 99212 OFFICE O/P EST SF 10 MIN: CPT | Performed by: FAMILY MEDICINE

## 2023-01-30 ASSESSMENT — ENCOUNTER SYMPTOMS
WHEEZING: 0
SHORTNESS OF BREATH: 0
RHINORRHEA: 0
EYE REDNESS: 0
EYE DISCHARGE: 0
DIARRHEA: 1
SORE THROAT: 0
SINUS PRESSURE: 0
ABDOMINAL PAIN: 0
COUGH: 0
NAUSEA: 0
CONSTIPATION: 0
TROUBLE SWALLOWING: 0
VOMITING: 0

## 2023-01-30 NOTE — PROGRESS NOTES
2023     Apolonia Shen (:  1955) is a 69 y.o. female, here for evaluation of the following medical concerns:    HPI  Patient comes in today for follow up of chronic health issues Patient does states she has been having some issues with intermittent diarrhea that is quite pronounced. She states that it does happen quite abruptly and sometimes she just does not think she is going to get a bathroom in time. Will have maybe 3 episodes throughout the course of the acute flare and then sometimes this can last for several days but sometimes it can be very brief. She states it waxes and wanes as it is not consistent. She does sometimes get some lower abdominal cramping with the acute flares but its not severe pain. Does not change her appetite. Does not get nauseated with it. She was keeping a food diary for a while and she really could not find any specific foods that were causing the issue. Did discuss with her a FODMAP diet and I will print off the list for her to review to see if there are certain foods that may be triggering some of her symptoms. She has been doing a probiotic and recently feels like she is doing a little bit better. She states that a friend of hers has similar issues and started taking Metamucil and thought that was helping and wondered if this would be something she should try. I did advise her she can start with a once daily dose of Metamucil to see if that provides her with benefit. Could also consider something like colestipol if it becomes more of a persistent issue. Did have a colonoscopy back in 2018 that did not show any significant concerning abnormalities in the GI tract. Patient otherwise has had no other acute related symptoms. She does have a known history of impaired fasting glucose and her blood sugar levels are stable and controlled with her current dietary regimen.   Has known hypothyroidism and her thyroid levels are stable and adequately supplemented on her current thyroid dose. Does have known hyperlipidemia and she did get her lab work done this morning so I do not have her cholesterol values back yet while she was here in the office but previous checks did show that these were stable and controlled with her current statin dose. Patient otherwise has no other acute medical concerns. .  Patient's recent lab reports are as follows:    Lab Results   Component Value Date/Time    WBC 5.3 01/30/2023 08:23 AM    RBC 4.42 01/30/2023 08:23 AM    RBC 0-2 06/12/2017 08:20 AM    HGB 14.0 01/30/2023 08:23 AM    HCT 41.8 01/30/2023 08:23 AM    MCV 94.6 01/30/2023 08:23 AM    MCH 31.7 01/30/2023 08:23 AM    MCHC 33.5 01/30/2023 08:23 AM    RDW 12.2 01/30/2023 08:23 AM     01/30/2023 08:23 AM    MPV 9.6 01/30/2023 08:23 AM       Lab Results   Component Value Date/Time    CHOL 138 08/08/2022 08:54 AM    HDL 56 08/08/2022 08:54 AM    CHOLHDLRATIO 2.5 08/08/2022 08:54 AM    TRIG 122 08/08/2022 08:54 AM    VLDL NOT REPORTED 01/31/2022 08:51 AM       Lab Results   Component Value Date/Time    TSH 2.56 01/30/2023 08:23 AM       Lab Results   Component Value Date/Time     01/30/2023 08:23 AM    K 4.6 01/30/2023 08:23 AM     01/30/2023 08:23 AM    CO2 28 01/30/2023 08:23 AM    BUN 16 01/30/2023 08:23 AM    CREATININE 0.72 01/30/2023 08:23 AM    GLUCOSE 115 01/30/2023 08:23 AM    CALCIUM 9.8 01/30/2023 08:23 AM       Lab Results   Component Value Date/Time    LABA1C 5.5 08/08/2022 08:54 AM                Other review of systems are as noted below. Preventative measures are reviewed today. See health maintenance section for complete preventative plan of care. Did review patient's med list, allergies, social history, fam history, pmhx and pshx today as noted in the record. Review of Systems   Constitutional:  Negative for chills, fatigue and fever.    HENT:  Negative for congestion, ear pain, postnasal drip, rhinorrhea, sinus pressure, sore throat and trouble swallowing. Eyes:  Negative for discharge and redness. Respiratory:  Negative for cough, shortness of breath and wheezing. Cardiovascular:  Negative for chest pain. Gastrointestinal:  Positive for diarrhea. Negative for abdominal pain, constipation, nausea and vomiting. Genitourinary:  Negative for dysuria, flank pain, frequency and urgency. Musculoskeletal:  Negative for arthralgias, myalgias and neck pain. Skin:  Negative for rash and wound. Allergic/Immunologic: Negative for environmental allergies. Neurological:  Negative for dizziness, weakness, light-headedness and headaches. Hematological:  Negative for adenopathy. Psychiatric/Behavioral: Negative. Prior to Visit Medications    Medication Sig Taking?  Authorizing Provider   carvedilol (COREG) 3.125 MG tablet TAKE ONE TABLET BY MOUTH TWICE A DAY  Aileen Bautista,    atorvastatin (LIPITOR) 40 MG tablet TAKE ONE TABLET BY MOUTH EVERY NIGHT AT BEDTIME  Carles Bloch, DO   clopidogrel (PLAVIX) 75 MG tablet TAKE ONE TABLET BY MOUTH DAILY  Carles Bloch, DO   levothyroxine (SYNTHROID) 112 MCG tablet TAKE ONE TABLET BY MOUTH DAILY  Carles Bloch, DO   losartan (COZAAR) 25 MG tablet Take 0.5 tablets by mouth daily  Diaz Mark MD   Multiple Vitamin (MULTI VITAMIN DAILY PO) Take by mouth  Historical Provider, MD   calcium carbonate 600 MG TABS tablet Take 1 tablet by mouth daily  Historical Provider, MD   Probiotic Product (PROBIOTIC DAILY PO) Take by mouth  Historical Provider, MD   aspirin 81 MG tablet Take 81 mg by mouth daily  Historical Provider, MD        Social History     Tobacco Use    Smoking status: Former     Packs/day: 2.00     Years: 25.00     Pack years: 50.00     Types: Cigarettes     Start date: 1973     Quit date: 2001     Years since quittin.6    Smokeless tobacco: Never    Tobacco comments:     quit in    Substance Use Topics    Alcohol use: Yes     Comment: socia, glass of wine at night        There were no vitals filed for this visit. Estimated body mass index is 25.25 kg/m² as calculated from the following:    Height as of 1/2/23: 5' 4\" (1.626 m). Weight as of 1/2/23: 147 lb 2 oz (66.7 kg). Physical Exam  Vitals and nursing note reviewed. Constitutional:       General: She is not in acute distress. Appearance: Normal appearance. She is well-developed. She is not diaphoretic. HENT:      Head: Normocephalic and atraumatic. Right Ear: Tympanic membrane, ear canal and external ear normal.      Left Ear: Tympanic membrane, ear canal and external ear normal.      Nose: Nose normal.      Mouth/Throat:      Mouth: Mucous membranes are moist.      Pharynx: Oropharynx is clear. No oropharyngeal exudate. Eyes:      General:         Right eye: No discharge. Left eye: No discharge. Conjunctiva/sclera: Conjunctivae normal.      Pupils: Pupils are equal, round, and reactive to light. Neck:      Thyroid: No thyromegaly. Cardiovascular:      Rate and Rhythm: Normal rate and regular rhythm. Heart sounds: Normal heart sounds. Pulmonary:      Effort: Pulmonary effort is normal.      Breath sounds: Normal breath sounds. No wheezing or rales. Abdominal:      General: Bowel sounds are normal. There is no distension. Palpations: Abdomen is soft. Tenderness: There is no abdominal tenderness. Musculoskeletal:      Cervical back: Normal range of motion and neck supple. Lymphadenopathy:      Cervical: No cervical adenopathy. Skin:     General: Skin is warm and dry. Findings: No rash. Neurological:      Mental Status: She is alert and oriented to person, place, and time. Psychiatric:         Behavior: Behavior normal.         Thought Content: Thought content normal.         Judgment: Judgment normal.         ASSESSMENT/PLAN:    Encounter Diagnoses   Name Primary?     Impaired fasting glucose Yes    Acquired hypothyroidism     Mixed hyperlipidemia     Diarrhea, unspecified type     Screening mammogram for breast cancer      No orders of the defined types were placed in this encounter. Orders Placed This Encounter   Procedures    DANIELITO QUINCY DIGITAL SCREEN BILATERAL     Standing Status:   Future     Standing Expiration Date:   3/30/2024     Order Specific Question:   Reason for exam:     Answer:   screening mammogram    CBC with Auto Differential     Standing Status:   Future     Standing Expiration Date:   1/30/2024    Comprehensive Metabolic Panel     Standing Status:   Future     Standing Expiration Date:   1/30/2024    Hemoglobin A1C     Standing Status:   Future     Standing Expiration Date:   1/30/2024    Lipid Panel     Standing Status:   Future     Standing Expiration Date:   1/30/2024     Order Specific Question:   Is Patient Fasting?/# of Hours     Answer:   12 hours    TSH     Standing Status:   Future     Standing Expiration Date:   1/30/2024    T4, Free     Standing Status:   Future     Standing Expiration Date:   1/30/2024     Patient is going to try Metamucil daily to see if this helps with her bowel consistency. I also had given her a FODMAP diet list to see if this possibly may have some impact on her intermittent diarrhea issues. If she has persistent issues could try something like colestipol on a daily basis to see if that helps keep her bowels from having flares. Also could consider GI referral if needed. Patient is to continue on the rest of her current medical therapy. No additional changes are made at this time. Patient is to continue to follow a low-carb/low sugar/low-fat diet and increase exercise for optimal blood sugar and cholesterol control. Patient is to return to my office in 6 months duration or sooner if any further problems or symptoms arise.     (Please note that portions of this note were completed with a voice recognition program. Efforts were made to edit the dictations but occasionally words are mis-transcribed.)    No follow-ups on file. An electronic signature was used to authenticate this note.     --Jamey Suresh DO on 1/30/2023 at 7:20 AM

## 2023-02-14 DIAGNOSIS — I63.012 CEREBROVASCULAR ACCIDENT (CVA) DUE TO THROMBOSIS OF LEFT VERTEBRAL ARTERY (HCC): ICD-10-CM

## 2023-02-14 DIAGNOSIS — E03.9 ACQUIRED HYPOTHYROIDISM: ICD-10-CM

## 2023-02-14 RX ORDER — CLOPIDOGREL BISULFATE 75 MG/1
TABLET ORAL
Qty: 90 TABLET | Refills: 1 | Status: SHIPPED | OUTPATIENT
Start: 2023-02-14

## 2023-02-14 RX ORDER — LEVOTHYROXINE SODIUM 112 UG/1
TABLET ORAL
Qty: 90 TABLET | Refills: 1 | Status: SHIPPED | OUTPATIENT
Start: 2023-02-14

## 2023-02-14 NOTE — TELEPHONE ENCOUNTER
Quintin De La O called requesting a refill of the below medication which has been pended for you:     Requested Prescriptions     Pending Prescriptions Disp Refills    clopidogrel (PLAVIX) 75 MG tablet [Pharmacy Med Name: CLOPIDOGREL 75 MG TABLET] 90 tablet 1     Sig: TAKE ONE TABLET BY MOUTH DAILY    levothyroxine (SYNTHROID) 112 MCG tablet [Pharmacy Med Name: LEVOTHYROXINE 112 MCG TABLET] 90 tablet 1     Sig: TAKE ONE TABLET BY MOUTH DAILY       Last Appointment Date: 1/30/2023  Next Appointment Date: 7/31/2023    No Known Allergies

## 2023-02-17 ENCOUNTER — TELEMEDICINE (OUTPATIENT)
Dept: FAMILY MEDICINE CLINIC | Age: 68
End: 2023-02-17
Payer: MEDICARE

## 2023-02-17 DIAGNOSIS — Z00.00 MEDICARE ANNUAL WELLNESS VISIT, SUBSEQUENT: Primary | ICD-10-CM

## 2023-02-17 PROCEDURE — 3017F COLORECTAL CA SCREEN DOC REV: CPT | Performed by: FAMILY MEDICINE

## 2023-02-17 PROCEDURE — G8484 FLU IMMUNIZE NO ADMIN: HCPCS | Performed by: FAMILY MEDICINE

## 2023-02-17 PROCEDURE — G0439 PPPS, SUBSEQ VISIT: HCPCS | Performed by: FAMILY MEDICINE

## 2023-02-17 PROCEDURE — 1123F ACP DISCUSS/DSCN MKR DOCD: CPT | Performed by: FAMILY MEDICINE

## 2023-02-17 SDOH — ECONOMIC STABILITY: HOUSING INSECURITY
IN THE LAST 12 MONTHS, WAS THERE A TIME WHEN YOU DID NOT HAVE A STEADY PLACE TO SLEEP OR SLEPT IN A SHELTER (INCLUDING NOW)?: NO

## 2023-02-17 SDOH — ECONOMIC STABILITY: FOOD INSECURITY: WITHIN THE PAST 12 MONTHS, THE FOOD YOU BOUGHT JUST DIDN'T LAST AND YOU DIDN'T HAVE MONEY TO GET MORE.: NEVER TRUE

## 2023-02-17 SDOH — ECONOMIC STABILITY: FOOD INSECURITY: WITHIN THE PAST 12 MONTHS, YOU WORRIED THAT YOUR FOOD WOULD RUN OUT BEFORE YOU GOT MONEY TO BUY MORE.: NEVER TRUE

## 2023-02-17 SDOH — ECONOMIC STABILITY: INCOME INSECURITY: HOW HARD IS IT FOR YOU TO PAY FOR THE VERY BASICS LIKE FOOD, HOUSING, MEDICAL CARE, AND HEATING?: NOT HARD AT ALL

## 2023-02-17 ASSESSMENT — PATIENT HEALTH QUESTIONNAIRE - PHQ9
SUM OF ALL RESPONSES TO PHQ QUESTIONS 1-9: 0
SUM OF ALL RESPONSES TO PHQ QUESTIONS 1-9: 0
1. LITTLE INTEREST OR PLEASURE IN DOING THINGS: 0
2. FEELING DOWN, DEPRESSED OR HOPELESS: 0
SUM OF ALL RESPONSES TO PHQ QUESTIONS 1-9: 0
SUM OF ALL RESPONSES TO PHQ QUESTIONS 1-9: 0
SUM OF ALL RESPONSES TO PHQ9 QUESTIONS 1 & 2: 0

## 2023-02-17 ASSESSMENT — LIFESTYLE VARIABLES
HAS A RELATIVE, FRIEND, DOCTOR, OR ANOTHER HEALTH PROFESSIONAL EXPRESSED CONCERN ABOUT YOUR DRINKING OR SUGGESTED YOU CUT DOWN: 0
HOW OFTEN DURING THE LAST YEAR HAVE YOU HAD A FEELING OF GUILT OR REMORSE AFTER DRINKING: 0
HAVE YOU OR SOMEONE ELSE BEEN INJURED AS A RESULT OF YOUR DRINKING: 0
HOW OFTEN DURING THE LAST YEAR HAVE YOU FAILED TO DO WHAT WAS NORMALLY EXPECTED FROM YOU BECAUSE OF DRINKING: 0
HOW MANY STANDARD DRINKS CONTAINING ALCOHOL DO YOU HAVE ON A TYPICAL DAY: 1 OR 2
HOW OFTEN DO YOU HAVE A DRINK CONTAINING ALCOHOL: 4 OR MORE TIMES A WEEK
HOW OFTEN DURING THE LAST YEAR HAVE YOU BEEN UNABLE TO REMEMBER WHAT HAPPENED THE NIGHT BEFORE BECAUSE YOU HAD BEEN DRINKING: 0
HOW OFTEN DURING THE LAST YEAR HAVE YOU FOUND THAT YOU WERE NOT ABLE TO STOP DRINKING ONCE YOU HAD STARTED: 0
HOW OFTEN DURING THE LAST YEAR HAVE YOU NEEDED AN ALCOHOLIC DRINK FIRST THING IN THE MORNING TO GET YOURSELF GOING AFTER A NIGHT OF HEAVY DRINKING: 0

## 2023-02-17 NOTE — PROGRESS NOTES
Medicare Annual Wellness Visit    Richie Epstein is here for Medicare AWV (Subsequent; last 2/15/2022)    Assessment & Plan    Recommendations for Preventive Services Due: see orders and patient instructions/AVS.    Patient declines covid booster vaccine. Recommended screening schedule for the next 5-10 years is provided to the patient in written form: see Patient Instructions/AVS.     No follow-ups on file. Subjective     Patient's complete Health Risk Assessment and screening values have been reviewed and are found in Flowsheets. The following problems were reviewed today and where indicated follow up appointments were made and/or referrals ordered. Positive Risk Factor Screenings with Interventions:                       Advanced Directives:  Do you have a Living Will?: (!) No    Intervention:  has NO advanced directive - information provided        Objective      Patient-Reported Vitals  Patient-Reported Weight: 147lb  Patient-Reported Height: 5'4\"            No Known Allergies  Prior to Visit Medications    Medication Sig Taking?  Authorizing Provider   clopidogrel (PLAVIX) 75 MG tablet TAKE ONE TABLET BY MOUTH DAILY Yes Reggie Spencer DO   levothyroxine (SYNTHROID) 112 MCG tablet TAKE ONE TABLET BY MOUTH DAILY Yes Reggie Spencer DO   carvedilol (COREG) 3.125 MG tablet TAKE ONE TABLET BY MOUTH TWICE A DAY Yes Reggie Spencer DO   atorvastatin (LIPITOR) 40 MG tablet TAKE ONE TABLET BY MOUTH EVERY NIGHT AT BEDTIME Yes Reggie Spencer DO   losartan (COZAAR) 25 MG tablet Take 0.5 tablets by mouth daily Yes Robin Delgaod MD   Multiple Vitamin (MULTI VITAMIN DAILY PO) Take by mouth Yes Historical Provider, MD   calcium carbonate 600 MG TABS tablet Take 1 tablet by mouth daily Yes Historical Provider, MD   Probiotic Product (PROBIOTIC DAILY PO) Take by mouth Yes Historical Provider, MD   aspirin 81 MG tablet Take 81 mg by mouth daily Yes Historical Provider, MD       [facility] (Including outside providers/suppliers regularly involved in providing care):   Patient Care Team:  Mirlande Sanchez DO as PCP - General (Family Medicine)  Mirlande Sanchez DO as PCP - Empaneled Provider  Meli Metzger MD as Consulting Physician (Internal Medicine Cardiovascular Disease)     Reviewed and updated this visit:  Tobacco  Allergies  Meds  Med Hx  Surg Hx  Soc Hx  Fam Hx        I, Radha Mccurdy RN, 2/17/2023, performed the documented evaluation under the direct supervision of the attending physician. Salbador Fitzgerald, was evaluated through a synchronous (real-time) audio encounter. The patient (or guardian if applicable) is aware that this is a billable service, which includes applicable co-pays. This Virtual Visit was conducted with patient's (and/or legal guardian's) consent. The visit was conducted pursuant to the emergency declaration under the [address] [place] [address] [facility] authority and the [employer] General Act. Patient identification was verified, and a caregiver was present when appropriate.    The patient was located at Home: [address] [place] [address],[address] [address]  Provider was located at [facility] (Appt Dept): [address] [place], [place] [id number] [facility] Ben Mccurdy RN

## 2023-02-17 NOTE — PATIENT INSTRUCTIONS
Personalized Preventive Plan for Lanny Agarwal - 2/17/2023  Medicare offers a range of preventive health benefits. Some of the tests and screenings are paid in full while other may be subject to a deductible, co-insurance, and/or copay. Some of these benefits include a comprehensive review of your medical history including lifestyle, illnesses that may run in your family, and various assessments and screenings as appropriate. After reviewing your medical record and screening and assessments performed today your provider may have ordered immunizations, labs, imaging, and/or referrals for you. A list of these orders (if applicable) as well as your Preventive Care list are included within your After Visit Summary for your review. Other Preventive Recommendations:    A preventive eye exam performed by an eye specialist is recommended every 1-2 years to screen for glaucoma; cataracts, macular degeneration, and other eye disorders. A preventive dental visit is recommended every 6 months. Try to get at least 150 minutes of exercise per week or 10,000 steps per day on a pedometer . Order or download the FREE \"Exercise & Physical Activity: Your Everyday Guide\" from [employer]. Call 8-397.449.2634 or search [employer] online. You need 4452-5986 mg of calcium and 1295-6247 IU of vitamin D per day. It is possible to meet your calcium requirement with diet alone, but a vitamin D supplement is usually necessary to meet this goal.  When exposed to the sun, use a sunscreen that protects against both UVA and UVB radiation with an SPF of 30 or greater. Reapply every 2 to 3 hours or after sweating, drying off with a towel, or swimming. Always wear a seat belt when traveling in a car. Always wear a helmet when riding a bicycle or motorcycle. Heart-Healthy Diet   Sodium, Fat, and Cholesterol Controlled Diet       What Is a Heart Healthy Diet?    A heart-healthy diet is one that limits sodium , certain types of fat , and cholesterol . This type of diet is recommended for:   People with any form of cardiovascular disease (eg, coronary heart disease , peripheral vascular disease , previous heart attack , previous stroke )   People with risk factors for cardiovascular disease, such as high blood pressure , high cholesterol , or diabetes   Anyone who wants to lower their risk of developing cardiovascular disease   Sodium    Sodium is a mineral found in many foods. In general, most people consume much more sodium than they need. Diets high in sodium can increase blood pressure and lead to edema (water retention). On a heart-healthy diet, you should consume no more than 2,300 mg (milligrams) of sodium per dayabout the amount in one teaspoon of table salt. The foods highest in sodium include table salt (about 50% sodium), processed foods, convenience foods, and preserved foods. Cholesterol    Cholesterol is a fat-like, waxy substance in your blood. Our bodies make some cholesterol. It is also found in animal products, with the highest amounts in fatty meat, egg yolks, whole milk, cheese, shellfish, and organ meats. On a heart-healthy diet, you should limit your cholesterol intake to less than 200 mg per day. It is normal and important to have some cholesterol in your bloodstream. But too much cholesterol can cause plaque to build up within your arteries, which can eventually lead to a heart attack or stroke. The two types of cholesterol that are most commonly referred to are:   Low-density lipoprotein (LDL) cholesterol  Also known as bad cholesterol, this is the cholesterol that tends to build up along your arteries. Bad cholesterol levels are increased by eating fats that are saturated or hydrogenated. Optimal level of this cholesterol is less than 100. Over 130 starts to get risky for heart disease.    High-density lipoprotein (HDL) cholesterol  Also known as good cholesterol, this type of cholesterol actually carries cholesterol away from your arteries and may, therefore, help lower your risk of having a heart attack. You want this level to be high (ideally greater than 60). It is a risk to have a level less than 40. You can raise this good cholesterol by eating olive oil, canola oil, avocados, or nuts. Exercise raises this level, too. Fat    Fat is calorie dense and packs a lot of calories into a small amount of food. Even though fats should be limited due to their high calorie content, not all fats are bad. In fact, some fats are quite healthful. Fat can be broken down into four main types. The good-for-you fats are:   Monounsaturated fat  found in oils such as olive and canola, avocados, and nuts and natural nut butters; can decrease cholesterol levels, while keeping levels of HDL cholesterol high   Polyunsaturated fat  found in oils such as safflower, sunflower, soybean, corn, and sesame; can decrease total cholesterol and LDL cholesterol   Omega-3 fatty acids  particularly those found in fatty fish (such as salmon, trout, tuna, mackerel, herring, and sardines); can decrease risk of arrhythmias, decrease triglyceride levels, and slightly lower blood pressure   The fats that you want to limit are:   Saturated fat  found in animal products, many fast foods, and a few vegetables; increases total blood cholesterol, including LDL levels   Animal fats that are saturated include: butter, lard, whole-milk dairy products, meat fat, and poultry skin   Vegetable fats that are saturated include: hydrogenated shortening, palm oil, coconut oil, cocoa butter   Hydrogenated or trans fat  found in margarine and vegetable shortening, most shelf stable snack foods, and fried foods; increases LDL and decreases HDL     It is generally recommended that you limit your total fat for the day to less than 30% of your total calories.  If you follow an 1800-calorie heart healthy diet, for example, this would mean 60 grams of fat or less per day. Saturated fat and trans fat in your diet raises your blood cholesterol the most, much more than dietary cholesterol does. For this reason, on a heart-healthy diet, less than 7% of your calories should come from saturated fat and ideally 0% from trans fat. On an 1800-calorie diet, this translates into less than 14 grams of saturated fat per day, leaving 46 grams of fat to come from mono- and polyunsaturated fats.    Food Choices on a Heart Healthy Diet   Food Category   Foods Recommended   Foods to Avoid   Grains   Breads and rolls without salted tops Most dry and cooked cereals Unsalted crackers and breadsticks Low-sodium or homemade breadcrumbs or stuffing All rice and pastas   Breads, rolls, and crackers with salted tops High-fat baked goods (eg, muffins, donuts, pastries) Quick breads, self-rising flour, and biscuit mixes Regular bread crumbs Instant hot cereals Commercially prepared rice, pasta, or stuffing mixes   Vegetables   Most fresh, frozen, and low-sodium canned vegetables Low-sodium and salt-free vegetable juices Canned vegetables if unsalted or rinsed   Regular canned vegetables and juices, including sauerkraut and pickled vegetables Frozen vegetables with sauces Commercially prepared potato and vegetable mixes   Fruits   Most fresh, frozen, and canned fruits All fruit juices   Fruits processed with salt or sodium   Milk   Nonfat or low-fat (1%) milk Nonfat or low-fat yogurt Cottage cheese, low-fat ricotta, cheeses labeled as low-fat and low-sodium   Whole milk Reduced-fat (2%) milk Malted and chocolate milk Full fat yogurt Most cheeses (unless low-fat and low salt) Buttermilk (no more than 1 cup per week)   Meats and Beans   Lean cuts of fresh or frozen beef, veal, lamb, or pork (look for the word loin) Fresh or frozen poultry without the skin Fresh or frozen fish and some shellfish Egg whites and egg substitutes (Limit whole eggs to three per week) Tofu Nuts or seeds (unsalted, dry-roasted), low-sodium peanut butter Dried peas, beans, and lentils   Any smoked, cured, salted, or canned meat, fish, or poultry (including jones, chipped beef, cold cuts, hot dogs, sausages, sardines, and anchovies) Poultry skins Breaded and/or fried fish or meats Canned peas, beans, and lentils Salted nuts   Fats and Oils   Olive oil and canola oil Low-sodium, low-fat salad dressings and mayonnaise   Butter, margarine, coconut and palm oils, jones fat   Snacks, Sweets, and Condiments   Low-sodium or unsalted versions of broths, soups, soy sauce, and condiments Pepper, herbs, and spices; vinegar, lemon, or lime juice Low-fat frozen desserts (yogurt, sherbet, fruit bars) Sugar, cocoa powder, honey, syrup, jam, and preserves Low-fat, trans-fat free cookies, cakes, and pies Gian and animal crackers, fig bars, belén snaps   High-fat desserts Broth, soups, gravies, and sauces, made from instant mixes or other high-sodium ingredients Salted snack foods Canned olives Meat tenderizers, seasoning salt, and most flavored vinegars   Beverages   Low-sodium carbonated beverages Tea and coffee in moderation Soy milk   Commercially softened water   Suggestions   Make whole grains, fruits, and vegetables the base of your diet. Choose heart-healthy fats such as canola, olive, and flaxseed oil, and foods high in heart-healthy fats, such as nuts, seeds, soybeans, tofu, and fish. Eat fish at least twice per week; the fish highest in omega-3 fatty acids and lowest in mercury include salmon, herring, mackerel, sardines, and canned chunk light tuna. If you eat fish less than twice per week or have high triglycerides, talk to your doctor about taking fish oil supplements. Read food labels. For products low in fat and cholesterol, look for fat free, low-fat, cholesterol free, saturated fat free, and trans fat freeAlso scan the Nutrition Facts Label, which lists saturated fat, trans fat, and cholesterol amounts. For products low in sodium, look for sodium free, very low sodium, low sodium, no added salt, and unsalted   Skip the salt when cooking or at the table; if food needs more flavor, get creative and try out different herbs and spices. Garlic and onion also add substantial flavor to foods. Trim any visible fat off meat and poultry before cooking, and drain the fat off after davis. Use cooking methods that require little or no added fat, such as grilling, boiling, baking, poaching, broiling, roasting, steaming, stir-frying, and sauting. Avoid fast food and convenience food. They tend to be high in saturated and trans fat and have a lot of added salt. Talk to a registered dietitian for individualized diet advice. Last Reviewed: March 2011 Kim Dominguez MS, MPH, RD   Updated: 3/29/2011          Learning About Uriah Bright  What is a living will? A living will, also called a declaration, is a legal form. It tells your family and your doctor your wishes when you can't speak for yourself. It's used by the health professionals who will treat you as you near the end of your life or if you get seriously hurt or ill. If you put your wishes in writing, your loved ones and others will know what kind of care you want. They won't need to guess. This can ease your mind and be helpful to others. And you can change or cancel your living will at any time. A living will is not the same as an estate or property will. An estate will explains what you want to happen with your money and property after you die. How do you use it? Keep these facts in mind about how a living will is used. Your living will is used only if you can't speak or make decisions for yourself. Most often, one or more doctors must certify that you can't speak or decide for yourself before your living will takes effect. If you get better and can speak for yourself again, you can accept or refuse any treatment.  It doesn't matter what you said in your living will. Some states may limit your right to refuse treatment in certain cases. For example, you may need to clearly state in your living will that you don't want artificial hydration and nutrition, such as being fed through a tube. Is a living will a legal document? A living will is a legal document. Each state has its own laws about living sanders. And a living will may be called something else in your state. Here are some things to know about living sanders. You don't need an  to complete a living will. But legal advice can be helpful if your state's laws are unclear. It can also help if your health history is complicated or your family can't agree on what should be in your living will. You can change your living will at any time. Some people find that their wishes about end-of-life care change as their health changes. If you make big changes to your living will, complete a new form. If you move to another state, make sure that your living will is legal in the state where you now live. In most cases, doctors will respect your wishes even if you have a form from a different state. You might use a universal form that has been approved by many states. This kind of form can sometimes be filled out and stored online. Your digital copy will then be available wherever you have a connection to the internet. The doctors and nurses who need to treat you can find it right away. Your state may offer an online registry. This is another place where you can store your living will online. It's a good idea to get your living will notarized. This means using a person called a  to watch two people sign, or witness, your living will. What should you know when you create a living will? Here are some questions to ask yourself as you make your living will. Do you know enough about life support methods that might be used?  If not, talk to your doctor so you know what might be done if you can't breathe on your own, your heart stops, or you can't swallow. What things would you still want to be able to do after you receive life-support methods? Would you want to be able to walk? To speak? To eat on your own? To live without the help of machines? Do you want certain [employer]ian practices performed if you become very ill? If you have a choice, where do you want to be cared for? In your home? At a hospital or nursing home? If you have a choice at the end of your life, where would you prefer to die? At home? In a hospital or nursing home? Somewhere else? Would you prefer to be buried or cremated? Do you want your organs to be donated after you die? What should you do with your living will? Make sure that your family members and your health care agent have copies of your living will (also called a declaration). Give your doctor a copy of your living will. Ask to have it kept as part of your medical record. If you have more than one doctor, make sure that each one has a copy. Put a copy of your living will where it can be easily found. For example, some people may put a copy on their refrigerator door. If you are using a digital copy, be sure your doctor, family members, and health care agent know how to find and access it. Where can you learn more? Go to http://www.grove.com/ and enter K356 to learn more about \"Learning About Living Harry Lamb. \"  Current as of: June 16, 2022               Content Version: 13.5  © 2006-2022 [employer]orporated. Care instructions adapted under license by [facility] ([facility]). If you have questions about a medical condition or this instruction, always ask your healthcare professional. [place] [address] any warranty or liability for your use of this information.

## 2023-05-17 DIAGNOSIS — E78.2 MIXED HYPERLIPIDEMIA: ICD-10-CM

## 2023-05-18 RX ORDER — CARVEDILOL 3.12 MG/1
TABLET ORAL
Qty: 180 TABLET | Refills: 1 | Status: SHIPPED | OUTPATIENT
Start: 2023-05-18

## 2023-05-18 RX ORDER — ATORVASTATIN CALCIUM 40 MG/1
TABLET, FILM COATED ORAL
Qty: 90 TABLET | Refills: 1 | Status: SHIPPED | OUTPATIENT
Start: 2023-05-18

## 2023-05-18 NOTE — TELEPHONE ENCOUNTER
[facility] called requesting a refill of the below medication which has been pended for you:     Requested Prescriptions     Pending Prescriptions Disp Refills    atorvastatin (LIPITOR) 40 MG tablet [Pharmacy Med Name: ATORVASTATIN 40 MG TABLET] 90 tablet 1     Sig: TAKE ONE TABLET BY MOUTH EVERY NIGHT AT BEDTIME    carvedilol (COREG) 3.125 MG tablet [Pharmacy Med Name: CARVEDILOL 3.125 MG TABLET] 180 tablet 1     Sig: TAKE ONE TABLET BY MOUTH TWICE A DAY       Last Appointment Date: 2/17/2023  Next Appointment Date: 7/31/2023    No Known Allergies

## 2023-08-13 DIAGNOSIS — I63.012 CEREBROVASCULAR ACCIDENT (CVA) DUE TO THROMBOSIS OF LEFT VERTEBRAL ARTERY (HCC): ICD-10-CM

## 2023-08-13 DIAGNOSIS — E03.9 ACQUIRED HYPOTHYROIDISM: ICD-10-CM

## 2023-08-14 ENCOUNTER — HOSPITAL ENCOUNTER (OUTPATIENT)
Age: 68
Discharge: HOME | End: 2023-08-14
Payer: MEDICARE

## 2023-08-14 DIAGNOSIS — E78.2 MIXED HYPERLIPIDEMIA: ICD-10-CM

## 2023-08-14 DIAGNOSIS — E03.9 ACQUIRED HYPOTHYROIDISM: ICD-10-CM

## 2023-08-14 DIAGNOSIS — R73.01 IMPAIRED FASTING GLUCOSE: ICD-10-CM

## 2023-08-14 LAB
ALBUMIN SERPL-MCNC: 4.7 G/DL (ref 3.5–5.2)
ALBUMIN/GLOB SERPL: 2 {RATIO} (ref 1–2.5)
ALP SERPL-CCNC: 111 U/L (ref 35–104)
ALT SERPL-CCNC: 21 U/L (ref 5–33)
ANION GAP SERPL CALCULATED.3IONS-SCNC: 10 MMOL/L (ref 9–17)
AST SERPL-CCNC: 19 U/L
BASOPHILS # BLD: 0.03 K/UL (ref 0–0.2)
BASOPHILS NFR BLD: 1 % (ref 0–2)
BILIRUB SERPL-MCNC: 0.6 MG/DL (ref 0.3–1.2)
BUN SERPL-MCNC: 16 MG/DL (ref 8–23)
BUN/CREAT SERPL: 23 (ref 9–20)
CALCIUM SERPL-MCNC: 10.1 MG/DL (ref 8.6–10.4)
CHLORIDE SERPL-SCNC: 102 MMOL/L (ref 98–107)
CHOLEST SERPL-MCNC: 145 MG/DL
CHOLESTEROL/HDL RATIO: 2.8
CO2 SERPL-SCNC: 28 MMOL/L (ref 20–31)
CREAT SERPL-MCNC: 0.7 MG/DL (ref 0.5–0.9)
EOSINOPHIL # BLD: 0.15 K/UL (ref 0–0.44)
EOSINOPHILS RELATIVE PERCENT: 3 % (ref 1–4)
ERYTHROCYTE [DISTWIDTH] IN BLOOD BY AUTOMATED COUNT: 11.9 % (ref 11.8–14.4)
GFR SERPL CREATININE-BSD FRML MDRD: >60 ML/MIN/1.73M2
GLUCOSE SERPL-MCNC: 116 MG/DL (ref 70–99)
HCT VFR BLD AUTO: 40.3 % (ref 36.3–47.1)
HDLC SERPL-MCNC: 51 MG/DL
HGB BLD-MCNC: 13.5 G/DL (ref 11.9–15.1)
IMM GRANULOCYTES # BLD AUTO: <0.03 K/UL (ref 0–0.3)
IMM GRANULOCYTES NFR BLD: 0 %
LDLC SERPL CALC-MCNC: 58 MG/DL (ref 0–130)
LYMPHOCYTES NFR BLD: 1.98 K/UL (ref 1.1–3.7)
LYMPHOCYTES RELATIVE PERCENT: 44 % (ref 24–43)
MCH RBC QN AUTO: 32.3 PG (ref 25.2–33.5)
MCHC RBC AUTO-ENTMCNC: 33.5 G/DL (ref 25.2–33.5)
MCV RBC AUTO: 96.4 FL (ref 82.6–102.9)
MONOCYTES NFR BLD: 0.31 K/UL (ref 0.1–1.2)
MONOCYTES NFR BLD: 7 % (ref 3–12)
NEUTROPHILS NFR BLD: 45 % (ref 36–65)
NEUTS SEG NFR BLD: 2.06 K/UL (ref 1.5–8.1)
NRBC BLD-RTO: 0 PER 100 WBC
PLATELET # BLD AUTO: 215 K/UL (ref 138–453)
PMV BLD AUTO: 9.6 FL (ref 8.1–13.5)
POTASSIUM SERPL-SCNC: 4.5 MMOL/L (ref 3.7–5.3)
PROT SERPL-MCNC: 7 G/DL (ref 6.4–8.3)
RBC # BLD AUTO: 4.18 M/UL (ref 3.95–5.11)
SODIUM SERPL-SCNC: 140 MMOL/L (ref 135–144)
T4 FREE SERPL-MCNC: 1.2 NG/DL (ref 0.9–1.7)
TRIGL SERPL-MCNC: 180 MG/DL
TSH SERPL DL<=0.05 MIU/L-ACNC: 2.27 UIU/ML (ref 0.3–5)
WBC OTHER # BLD: 4.5 K/UL (ref 3.5–11.3)

## 2023-08-14 PROCEDURE — 83036 HEMOGLOBIN GLYCOSYLATED A1C: CPT

## 2023-08-14 PROCEDURE — 84439 ASSAY OF FREE THYROXINE: CPT

## 2023-08-14 PROCEDURE — 80061 LIPID PANEL: CPT

## 2023-08-14 PROCEDURE — 36415 COLL VENOUS BLD VENIPUNCTURE: CPT

## 2023-08-14 PROCEDURE — 80053 COMPREHEN METABOLIC PANEL: CPT

## 2023-08-14 PROCEDURE — 85025 COMPLETE CBC W/AUTO DIFF WBC: CPT

## 2023-08-14 PROCEDURE — 84443 ASSAY THYROID STIM HORMONE: CPT

## 2023-08-14 NOTE — TELEPHONE ENCOUNTER
Maynor Gtz called requesting a refill of the below medication which has been pended for you:     Requested Prescriptions     Pending Prescriptions Disp Refills    clopidogrel (PLAVIX) 75 MG tablet [Pharmacy Med Name: CLOPIDOGREL 75 MG TABLET] 90 tablet 1     Sig: TAKE ONE TABLET BY MOUTH DAILY    levothyroxine (SYNTHROID) 112 MCG tablet [Pharmacy Med Name: LEVOTHYROXINE 112 MCG TABLET] 90 tablet 1     Sig: TAKE ONE TABLET BY MOUTH DAILY       Last Appointment Date: 2/17/2023  Next Appointment Date: 8/16/2023    No Known Allergies

## 2023-08-15 LAB
EST. AVERAGE GLUCOSE BLD GHB EST-MCNC: 117 MG/DL
HBA1C MFR BLD: 5.7 % (ref 4–6)

## 2023-08-15 RX ORDER — CLOPIDOGREL BISULFATE 75 MG/1
TABLET ORAL
Qty: 90 TABLET | Refills: 1 | Status: SHIPPED | OUTPATIENT
Start: 2023-08-15

## 2023-08-15 RX ORDER — LEVOTHYROXINE SODIUM 112 UG/1
TABLET ORAL
Qty: 90 TABLET | Refills: 1 | Status: SHIPPED | OUTPATIENT
Start: 2023-08-15

## 2023-08-16 ENCOUNTER — HOSPITAL ENCOUNTER (OUTPATIENT)
Dept: GENERAL RADIOLOGY | Age: 68
Discharge: HOME | End: 2023-08-18
Payer: MEDICARE

## 2023-08-16 ENCOUNTER — OFFICE VISIT (OUTPATIENT)
Dept: FAMILY MEDICINE CLINIC | Age: 68
End: 2023-08-16
Payer: MEDICARE

## 2023-08-16 VITALS
BODY MASS INDEX: 25.27 KG/M2 | RESPIRATION RATE: 16 BRPM | SYSTOLIC BLOOD PRESSURE: 136 MMHG | HEIGHT: 64 IN | OXYGEN SATURATION: 97 % | HEART RATE: 80 BPM | TEMPERATURE: 97.3 F | DIASTOLIC BLOOD PRESSURE: 80 MMHG | WEIGHT: 148 LBS

## 2023-08-16 DIAGNOSIS — R73.01 IMPAIRED FASTING GLUCOSE: Primary | ICD-10-CM

## 2023-08-16 DIAGNOSIS — G89.29 CHRONIC PAIN OF BOTH KNEES: ICD-10-CM

## 2023-08-16 DIAGNOSIS — M25.561 CHRONIC PAIN OF BOTH KNEES: ICD-10-CM

## 2023-08-16 DIAGNOSIS — E78.2 MIXED HYPERLIPIDEMIA: ICD-10-CM

## 2023-08-16 DIAGNOSIS — M25.562 CHRONIC PAIN OF BOTH KNEES: ICD-10-CM

## 2023-08-16 DIAGNOSIS — E03.9 ACQUIRED HYPOTHYROIDISM: ICD-10-CM

## 2023-08-16 PROCEDURE — 73562 X-RAY EXAM OF KNEE 3: CPT

## 2023-08-16 PROCEDURE — G8400 PT W/DXA NO RESULTS DOC: HCPCS | Performed by: FAMILY MEDICINE

## 2023-08-16 PROCEDURE — 1090F PRES/ABSN URINE INCON ASSESS: CPT | Performed by: FAMILY MEDICINE

## 2023-08-16 PROCEDURE — 99212 OFFICE O/P EST SF 10 MIN: CPT | Performed by: FAMILY MEDICINE

## 2023-08-16 PROCEDURE — 3017F COLORECTAL CA SCREEN DOC REV: CPT | Performed by: FAMILY MEDICINE

## 2023-08-16 PROCEDURE — 1123F ACP DISCUSS/DSCN MKR DOCD: CPT | Performed by: FAMILY MEDICINE

## 2023-08-16 PROCEDURE — G8417 CALC BMI ABV UP PARAM F/U: HCPCS | Performed by: FAMILY MEDICINE

## 2023-08-16 PROCEDURE — 99214 OFFICE O/P EST MOD 30 MIN: CPT | Performed by: FAMILY MEDICINE

## 2023-08-16 PROCEDURE — G8427 DOCREV CUR MEDS BY ELIG CLIN: HCPCS | Performed by: FAMILY MEDICINE

## 2023-08-16 PROCEDURE — 1036F TOBACCO NON-USER: CPT | Performed by: FAMILY MEDICINE

## 2023-08-16 ASSESSMENT — ENCOUNTER SYMPTOMS
SINUS PRESSURE: 0
NAUSEA: 0
DIARRHEA: 0
SORE THROAT: 0
CONSTIPATION: 0
WHEEZING: 0
SHORTNESS OF BREATH: 0
TROUBLE SWALLOWING: 0
RHINORRHEA: 0
EYE DISCHARGE: 0
ABDOMINAL PAIN: 0
VOMITING: 0
COUGH: 0
EYE REDNESS: 0

## 2023-08-16 NOTE — PATIENT INSTRUCTIONS
Hospital Outpatient Visit on 08/14/2023   Component Date Value Ref Range Status    WBC 08/14/2023 4.5  3.5 - 11.3 k/uL Final    RBC 08/14/2023 4.18  3.95 - 5.11 m/uL Final    Hemoglobin 08/14/2023 13.5  11.9 - 15.1 g/dL Final    Hematocrit 08/14/2023 40.3  36.3 - 47.1 % Final    MCV 08/14/2023 96.4  82.6 - 102.9 fL Final    MCH 08/14/2023 32.3  25.2 - 33.5 pg Final    MCHC 08/14/2023 33.5  25.2 - 33.5 g/dL Final    RDW 08/14/2023 11.9  11.8 - 14.4 % Final    Platelets 04/30/6048 215  138 - 453 k/uL Final    MPV 08/14/2023 9.6  8.1 - 13.5 fL Final    NRBC Automated 08/14/2023 0.0  0.0 per 100 WBC Final    Neutrophils % 08/14/2023 45  36 - 65 % Final    Lymphocytes % 08/14/2023 44 (H)  24 - 43 % Final    Monocytes % 08/14/2023 7  3 - 12 % Final    Eosinophils % 08/14/2023 3  1 - 4 % Final    Basophils % 08/14/2023 1  0 - 2 % Final    Immature Granulocytes 08/14/2023 0  0 % Final    Neutrophils Absolute 08/14/2023 2.06  1.50 - 8.10 k/uL Final    Lymphocytes Absolute 08/14/2023 1.98  1.10 - 3.70 k/uL Final    Monocytes Absolute 08/14/2023 0.31  0.10 - 1.20 k/uL Final    Eosinophils Absolute 08/14/2023 0.15  0.00 - 0.44 k/uL Final    Basophils Absolute 08/14/2023 0.03  0.00 - 0.20 k/uL Final    Absolute Immature Granulocyte 08/14/2023 <0.03  0.00 - 0.30 k/uL Final    Glucose 08/14/2023 116 (H)  70 - 99 mg/dL Final    BUN 08/14/2023 16  8 - 23 mg/dL Final    Creatinine 08/14/2023 0.7  0.5 - 0.9 mg/dL Final    Est, Glom Filt Rate 08/14/2023 >60  >60 mL/min/1.73m2 Final    Comment:       These results are not intended for use in patients <25years of age. eGFR results are calculated without a race factor using the 2021 CKD-EPI equation. Careful clinical correlation is recommended, particularly when comparing to results   calculated using previous equations.   The CKD-EPI equation is less accurate in patients with extremes of muscle mass, extra-renal   metabolism of creatine, excessive creatine ingestion, or following

## 2023-08-16 NOTE — PROGRESS NOTES
arise.    (Please note that portions of this note were completed with a voice recognition program. Efforts were made to edit the dictations but occasionally words are mis-transcribed.)      No follow-ups on file. An electronic signature was used to authenticate this note.     --Elda Perez, DO on 8/16/2023 at 6:44 AM

## 2023-08-17 ENCOUNTER — TELEPHONE (OUTPATIENT)
Dept: FAMILY MEDICINE CLINIC | Age: 68
End: 2023-08-17

## 2023-08-17 ENCOUNTER — HOSPITAL ENCOUNTER (OUTPATIENT)
Dept: MAMMOGRAPHY | Age: 68
Discharge: HOME | End: 2023-08-19
Attending: FAMILY MEDICINE
Payer: MEDICARE

## 2023-08-17 VITALS — BODY MASS INDEX: 25.27 KG/M2 | HEIGHT: 64 IN | WEIGHT: 148 LBS

## 2023-08-17 DIAGNOSIS — Z12.31 SCREENING MAMMOGRAM FOR BREAST CANCER: ICD-10-CM

## 2023-08-17 PROCEDURE — 77063 BREAST TOMOSYNTHESIS BI: CPT

## 2023-08-21 DIAGNOSIS — Z12.31 SCREENING MAMMOGRAM FOR BREAST CANCER: Primary | ICD-10-CM

## 2023-10-23 DIAGNOSIS — I10 PRIMARY HYPERTENSION: ICD-10-CM

## 2023-10-23 RX ORDER — LOSARTAN POTASSIUM 25 MG/1
25 TABLET ORAL DAILY
Qty: 30 TABLET | Refills: 5 | Status: SHIPPED | OUTPATIENT
Start: 2023-10-23

## 2023-11-01 ENCOUNTER — OFFICE VISIT (OUTPATIENT)
Dept: CARDIOLOGY | Age: 68
End: 2023-11-01
Payer: MEDICARE

## 2023-11-01 VITALS
DIASTOLIC BLOOD PRESSURE: 68 MMHG | SYSTOLIC BLOOD PRESSURE: 126 MMHG | BODY MASS INDEX: 26.02 KG/M2 | HEART RATE: 76 BPM | WEIGHT: 151.6 LBS

## 2023-11-01 DIAGNOSIS — I35.1 AORTIC VALVE INSUFFICIENCY, ETIOLOGY OF CARDIAC VALVE DISEASE UNSPECIFIED: ICD-10-CM

## 2023-11-01 DIAGNOSIS — I63.012 CEREBROVASCULAR ACCIDENT (CVA) DUE TO THROMBOSIS OF LEFT VERTEBRAL ARTERY (HCC): Primary | ICD-10-CM

## 2023-11-01 PROCEDURE — 99214 OFFICE O/P EST MOD 30 MIN: CPT | Performed by: NURSE PRACTITIONER

## 2023-11-01 PROCEDURE — 93010 ELECTROCARDIOGRAM REPORT: CPT | Performed by: NURSE PRACTITIONER

## 2023-11-01 PROCEDURE — 1123F ACP DISCUSS/DSCN MKR DOCD: CPT | Performed by: NURSE PRACTITIONER

## 2023-11-01 PROCEDURE — 99212 OFFICE O/P EST SF 10 MIN: CPT | Performed by: NURSE PRACTITIONER

## 2023-11-01 PROCEDURE — G8484 FLU IMMUNIZE NO ADMIN: HCPCS | Performed by: NURSE PRACTITIONER

## 2023-11-01 PROCEDURE — 3017F COLORECTAL CA SCREEN DOC REV: CPT | Performed by: NURSE PRACTITIONER

## 2023-11-01 PROCEDURE — G8400 PT W/DXA NO RESULTS DOC: HCPCS | Performed by: NURSE PRACTITIONER

## 2023-11-01 PROCEDURE — G8427 DOCREV CUR MEDS BY ELIG CLIN: HCPCS | Performed by: NURSE PRACTITIONER

## 2023-11-01 PROCEDURE — 93005 ELECTROCARDIOGRAM TRACING: CPT | Performed by: NURSE PRACTITIONER

## 2023-11-01 PROCEDURE — G8417 CALC BMI ABV UP PARAM F/U: HCPCS | Performed by: NURSE PRACTITIONER

## 2023-11-01 PROCEDURE — 1036F TOBACCO NON-USER: CPT | Performed by: NURSE PRACTITIONER

## 2023-11-01 PROCEDURE — 1090F PRES/ABSN URINE INCON ASSESS: CPT | Performed by: NURSE PRACTITIONER

## 2023-11-01 NOTE — PROGRESS NOTES
Today's Date: 11/1/2023  Patient's Name: Ivis Cantrell  Patient's age: 77 y.o., 1955    Subjective:  Ivis Cantrell is being seen in clinic today regarding HTN, HL      she is doing well from a cardiac standpoint. No chest pain, no dyspnea, no PND, no syncope or pre-syncope, no orthopnea. She tries to stay active         Past Medical History:   has a past medical history of Cerebrovascular accident (CVA) due to occlusion of left vertebral artery ([address]), Corneal neovascularization, Elevated fasting blood sugar, Hyperlipidemia, Hypothyroidism, IFG (impaired fasting glucose), Knee pain, Myopia with astigmatism, Neck pain, and Osteoarthritis. Past Surgical History:   has a past surgical history that includes Colonoscopy (05/2008); Insertable Cardiac Monitor (06/12/2017); Tonsillectomy; and pr colonoscopy flx dx w/collj spec when pfrmd (N/A, 7/11/2018). Home Medications:  Prior to Admission medications    Medication Sig Start Date End Date Taking?  Authorizing Provider   losartan (COZAAR) 25 MG tablet TAKE ONE TABLET BY MOUTH DAILY 10/23/23  Yes Princess Hank Melton, NICKY - CNP   clopidogrel (PLAVIX) 75 MG tablet TAKE ONE TABLET BY MOUTH DAILY 8/15/23  Yes Yulia Olmstead DO   levothyroxine (SYNTHROID) 112 MCG tablet TAKE ONE TABLET BY MOUTH DAILY 8/15/23  Yes Jamaal TOMPKINS, DO   atorvastatin (LIPITOR) 40 MG tablet TAKE ONE TABLET BY MOUTH EVERY NIGHT AT BEDTIME 5/18/23  Yes Jamaal TOMPKINS, DO   carvedilol (COREG) 3.125 MG tablet TAKE ONE TABLET BY MOUTH TWICE A DAY 5/18/23  Yes Yulia Olmstead DO   Multiple Vitamin (MULTI VITAMIN DAILY PO) Take by mouth   Yes Bhargav Le MD   calcium carbonate 600 MG TABS tablet Take 1 tablet by mouth daily   Yes Bhargav Le MD   Probiotic Product (PROBIOTIC DAILY PO) Take by mouth   Yes Bhargav Le MD   aspirin 81 MG tablet Take 1 tablet by mouth daily   Yes Bhargav Le MD       Allergies:  Patient has no known

## 2024-01-22 DIAGNOSIS — E78.2 MIXED HYPERLIPIDEMIA: ICD-10-CM

## 2024-01-22 NOTE — TELEPHONE ENCOUNTER
Elsy called requesting a refill of the below medication which has been pended for you:     Requested Prescriptions     Pending Prescriptions Disp Refills    atorvastatin (LIPITOR) 40 MG tablet [Pharmacy Med Name: ATORVASTATIN 40 MG TABLET] 90 tablet 0     Sig: TAKE ONE TABLET BY MOUTH EVERY NIGHT AT BEDTIME       Last Appointment Date: 8/16/2023  Next Appointment Date: 1/31/2024    No Known Allergies

## 2024-01-23 RX ORDER — ATORVASTATIN CALCIUM 40 MG/1
TABLET, FILM COATED ORAL
Qty: 90 TABLET | Refills: 0 | Status: SHIPPED | OUTPATIENT
Start: 2024-01-23

## 2024-01-29 ENCOUNTER — HOSPITAL ENCOUNTER (OUTPATIENT)
Age: 69
Discharge: HOME | End: 2024-01-29
Payer: MEDICARE

## 2024-01-29 DIAGNOSIS — R73.01 IMPAIRED FASTING GLUCOSE: ICD-10-CM

## 2024-01-29 DIAGNOSIS — E78.2 MIXED HYPERLIPIDEMIA: ICD-10-CM

## 2024-01-29 DIAGNOSIS — E03.9 ACQUIRED HYPOTHYROIDISM: ICD-10-CM

## 2024-01-29 LAB
ALBUMIN SERPL-MCNC: 4.6 G/DL (ref 3.5–5.2)
ALBUMIN/GLOB SERPL: 1.8 {RATIO} (ref 1–2.5)
ALP SERPL-CCNC: 105 U/L (ref 35–104)
ALT SERPL-CCNC: 22 U/L (ref 5–33)
ANION GAP SERPL CALCULATED.3IONS-SCNC: 9 MMOL/L (ref 9–17)
AST SERPL-CCNC: 20 U/L
BASOPHILS # BLD: 0.05 K/UL (ref 0–0.2)
BASOPHILS NFR BLD: 1 % (ref 0–2)
BILIRUB SERPL-MCNC: 0.7 MG/DL (ref 0.3–1.2)
BUN SERPL-MCNC: 16 MG/DL (ref 8–23)
BUN/CREAT SERPL: 23 (ref 9–20)
CALCIUM SERPL-MCNC: 10.1 MG/DL (ref 8.6–10.4)
CHLORIDE SERPL-SCNC: 102 MMOL/L (ref 98–107)
CHOLEST SERPL-MCNC: 157 MG/DL (ref 0–199)
CHOLESTEROL/HDL RATIO: 3
CO2 SERPL-SCNC: 29 MMOL/L (ref 20–31)
CREAT SERPL-MCNC: 0.7 MG/DL (ref 0.5–0.9)
EOSINOPHIL # BLD: 0.15 K/UL (ref 0–0.44)
EOSINOPHILS RELATIVE PERCENT: 3 % (ref 1–4)
ERYTHROCYTE [DISTWIDTH] IN BLOOD BY AUTOMATED COUNT: 11.9 % (ref 11.8–14.4)
EST. AVERAGE GLUCOSE BLD GHB EST-MCNC: 108 MG/DL
GFR SERPL CREATININE-BSD FRML MDRD: >60 ML/MIN/1.73M2
GLUCOSE SERPL-MCNC: 111 MG/DL (ref 70–99)
HBA1C MFR BLD: 5.4 % (ref 4–6)
HCT VFR BLD AUTO: 41 % (ref 36.3–47.1)
HDLC SERPL-MCNC: 54 MG/DL
HGB BLD-MCNC: 13.9 G/DL (ref 11.9–15.1)
IMM GRANULOCYTES # BLD AUTO: <0.03 K/UL (ref 0–0.3)
IMM GRANULOCYTES NFR BLD: 0 %
LDLC SERPL CALC-MCNC: 57 MG/DL (ref 0–100)
LYMPHOCYTES NFR BLD: 2.33 K/UL (ref 1.1–3.7)
LYMPHOCYTES RELATIVE PERCENT: 45 % (ref 24–43)
MCH RBC QN AUTO: 31.9 PG (ref 25.2–33.5)
MCHC RBC AUTO-ENTMCNC: 33.9 G/DL (ref 25.2–33.5)
MCV RBC AUTO: 94 FL (ref 82.6–102.9)
MONOCYTES NFR BLD: 0.42 K/UL (ref 0.1–1.2)
MONOCYTES NFR BLD: 8 % (ref 3–12)
NEUTROPHILS NFR BLD: 43 % (ref 36–65)
NEUTS SEG NFR BLD: 2.22 K/UL (ref 1.5–8.1)
NRBC BLD-RTO: 0 PER 100 WBC
PLATELET # BLD AUTO: 220 K/UL (ref 138–453)
PMV BLD AUTO: 9.5 FL (ref 8.1–13.5)
POTASSIUM SERPL-SCNC: 4.9 MMOL/L (ref 3.7–5.3)
PROT SERPL-MCNC: 7.1 G/DL (ref 6.4–8.3)
RBC # BLD AUTO: 4.36 M/UL (ref 3.95–5.11)
SODIUM SERPL-SCNC: 140 MMOL/L (ref 135–144)
T4 FREE SERPL-MCNC: 1.2 NG/DL (ref 0.93–1.7)
TRIGL SERPL-MCNC: 231 MG/DL
TSH SERPL DL<=0.05 MIU/L-ACNC: 2.38 UIU/ML (ref 0.3–5)
VLDLC SERPL CALC-MCNC: 46 MG/DL
WBC OTHER # BLD: 5.2 K/UL (ref 3.5–11.3)

## 2024-01-29 PROCEDURE — 83036 HEMOGLOBIN GLYCOSYLATED A1C: CPT

## 2024-01-29 PROCEDURE — 84443 ASSAY THYROID STIM HORMONE: CPT

## 2024-01-29 PROCEDURE — 36415 COLL VENOUS BLD VENIPUNCTURE: CPT

## 2024-01-29 PROCEDURE — 80053 COMPREHEN METABOLIC PANEL: CPT

## 2024-01-29 PROCEDURE — 80061 LIPID PANEL: CPT

## 2024-01-29 PROCEDURE — 84439 ASSAY OF FREE THYROXINE: CPT

## 2024-01-29 PROCEDURE — 85025 COMPLETE CBC W/AUTO DIFF WBC: CPT

## 2024-01-29 RX ORDER — CARVEDILOL 3.12 MG/1
3.12 TABLET ORAL 2 TIMES DAILY
Qty: 180 TABLET | Refills: 0 | Status: SHIPPED | OUTPATIENT
Start: 2024-01-29

## 2024-01-29 NOTE — TELEPHONE ENCOUNTER
Elsy called requesting a refill of the below medication which has been pended for you:     Requested Prescriptions     Pending Prescriptions Disp Refills    carvedilol (COREG) 3.125 MG tablet [Pharmacy Med Name: CARVEDILOL 3.125 MG TABLET] 180 tablet 0     Sig: TAKE 1 TABLET BY MOUTH TWICE A DAY       Last Appointment Date: 8/16/2023  Next Appointment Date: 1/31/2024    No Known Allergies

## 2024-01-31 ENCOUNTER — OFFICE VISIT (OUTPATIENT)
Dept: FAMILY MEDICINE CLINIC | Age: 69
End: 2024-01-31
Payer: MEDICARE

## 2024-01-31 VITALS
DIASTOLIC BLOOD PRESSURE: 64 MMHG | RESPIRATION RATE: 18 BRPM | HEART RATE: 84 BPM | SYSTOLIC BLOOD PRESSURE: 114 MMHG | WEIGHT: 152.3 LBS | TEMPERATURE: 97 F | HEIGHT: 64 IN | BODY MASS INDEX: 26 KG/M2 | OXYGEN SATURATION: 98 %

## 2024-01-31 DIAGNOSIS — E78.2 MIXED HYPERLIPIDEMIA: Primary | ICD-10-CM

## 2024-01-31 DIAGNOSIS — E03.9 ACQUIRED HYPOTHYROIDISM: ICD-10-CM

## 2024-01-31 DIAGNOSIS — L98.9 SKIN LESION: ICD-10-CM

## 2024-01-31 DIAGNOSIS — R73.01 IMPAIRED FASTING GLUCOSE: ICD-10-CM

## 2024-01-31 PROCEDURE — 99214 OFFICE O/P EST MOD 30 MIN: CPT | Performed by: FAMILY MEDICINE

## 2024-01-31 PROCEDURE — G8417 CALC BMI ABV UP PARAM F/U: HCPCS | Performed by: FAMILY MEDICINE

## 2024-01-31 PROCEDURE — G8427 DOCREV CUR MEDS BY ELIG CLIN: HCPCS | Performed by: FAMILY MEDICINE

## 2024-01-31 PROCEDURE — 1123F ACP DISCUSS/DSCN MKR DOCD: CPT | Performed by: FAMILY MEDICINE

## 2024-01-31 PROCEDURE — 3017F COLORECTAL CA SCREEN DOC REV: CPT | Performed by: FAMILY MEDICINE

## 2024-01-31 PROCEDURE — 1090F PRES/ABSN URINE INCON ASSESS: CPT | Performed by: FAMILY MEDICINE

## 2024-01-31 PROCEDURE — G8400 PT W/DXA NO RESULTS DOC: HCPCS | Performed by: FAMILY MEDICINE

## 2024-01-31 PROCEDURE — 1036F TOBACCO NON-USER: CPT | Performed by: FAMILY MEDICINE

## 2024-01-31 PROCEDURE — G8484 FLU IMMUNIZE NO ADMIN: HCPCS | Performed by: FAMILY MEDICINE

## 2024-01-31 ASSESSMENT — ENCOUNTER SYMPTOMS
WHEEZING: 0
EYE REDNESS: 0
DIARRHEA: 0
VOMITING: 0
COUGH: 0
RHINORRHEA: 0
EYE DISCHARGE: 0
ABDOMINAL PAIN: 0
NAUSEA: 0
CONSTIPATION: 0
SORE THROAT: 0
SHORTNESS OF BREATH: 0
TROUBLE SWALLOWING: 0
SINUS PRESSURE: 0

## 2024-01-31 ASSESSMENT — PATIENT HEALTH QUESTIONNAIRE - PHQ9
SUM OF ALL RESPONSES TO PHQ QUESTIONS 1-9: 0
SUM OF ALL RESPONSES TO PHQ QUESTIONS 1-9: 0
1. LITTLE INTEREST OR PLEASURE IN DOING THINGS: 0
SUM OF ALL RESPONSES TO PHQ QUESTIONS 1-9: 0
SUM OF ALL RESPONSES TO PHQ9 QUESTIONS 1 & 2: 0
SUM OF ALL RESPONSES TO PHQ QUESTIONS 1-9: 0
2. FEELING DOWN, DEPRESSED OR HOPELESS: 0

## 2024-01-31 NOTE — PROGRESS NOTES
2024     Elsy Mac (:  1955) is a 68 y.o. female, here for evaluation of the following medical concerns:    HPI  Patient comes in today for follow up of chronic health issues Patient overall is feeling relatively well.  Does have a known history of hyperlipidemia and her cholesterol levels are relatively stable and controlled on her current statin dose except for slight elevation in her triglycerides.  Did discuss lipid-lowering diet to keep this optimally controlled.  Has a known history of impaired fasting glucose and her blood sugar level is stable and controlled with her current dietary regimen.  Did encourage continued low-carb/low sugar diet to keep this optimally controlled.  Has a known history of hypothyroidism and her thyroid levels are adequately supplemented on her current thyroid dose.  Does state that she would like to get back into a dermatologist for a generalized skin assessment.  Has been several years since she has had an evaluation by dermatologist but had seen them back in  and notes that her  has had several lesions removed so she would just like to get a generalized skin assessment.  Patient otherwise has no other acute medical concerns..  Patient's recent lab reports are as follows:    Results for orders placed or performed during the hospital encounter of 24   T4, Free   Result Value Ref Range    T4 Free 1.2 0.93 - 1.70 ng/dL   CBC with Auto Differential   Result Value Ref Range    WBC 5.2 3.5 - 11.3 k/uL    RBC 4.36 3.95 - 5.11 m/uL    Hemoglobin 13.9 11.9 - 15.1 g/dL    Hematocrit 41.0 36.3 - 47.1 %    MCV 94.0 82.6 - 102.9 fL    MCH 31.9 25.2 - 33.5 pg    MCHC 33.9 (H) 25.2 - 33.5 g/dL    RDW 11.9 11.8 - 14.4 %    Platelets 220 138 - 453 k/uL    MPV 9.5 8.1 - 13.5 fL    NRBC Automated 0.0 0.0 per 100 WBC    Neutrophils % 43 36 - 65 %    Lymphocytes % 45 (H) 24 - 43 %    Monocytes % 8 3 - 12 %    Eosinophils % 3 1 - 4 %    Basophils % 1 0 - 2 %

## 2024-01-31 NOTE — PATIENT INSTRUCTIONS
Hospital Outpatient Visit on 01/29/2024   Component Date Value Ref Range Status    T4 Free 01/29/2024 1.2  0.93 - 1.70 ng/dL Final    WBC 01/29/2024 5.2  3.5 - 11.3 k/uL Final    RBC 01/29/2024 4.36  3.95 - 5.11 m/uL Final    Hemoglobin 01/29/2024 13.9  11.9 - 15.1 g/dL Final    Hematocrit 01/29/2024 41.0  36.3 - 47.1 % Final    MCV 01/29/2024 94.0  82.6 - 102.9 fL Final    MCH 01/29/2024 31.9  25.2 - 33.5 pg Final    MCHC 01/29/2024 33.9 (H)  25.2 - 33.5 g/dL Final    RDW 01/29/2024 11.9  11.8 - 14.4 % Final    Platelets 01/29/2024 220  138 - 453 k/uL Final    MPV 01/29/2024 9.5  8.1 - 13.5 fL Final    NRBC Automated 01/29/2024 0.0  0.0 per 100 WBC Final    Neutrophils % 01/29/2024 43  36 - 65 % Final    Lymphocytes % 01/29/2024 45 (H)  24 - 43 % Final    Monocytes % 01/29/2024 8  3 - 12 % Final    Eosinophils % 01/29/2024 3  1 - 4 % Final    Basophils % 01/29/2024 1  0 - 2 % Final    Immature Granulocytes 01/29/2024 0  0 % Final    Neutrophils Absolute 01/29/2024 2.22  1.50 - 8.10 k/uL Final    Lymphocytes Absolute 01/29/2024 2.33  1.10 - 3.70 k/uL Final    Monocytes Absolute 01/29/2024 0.42  0.10 - 1.20 k/uL Final    Eosinophils Absolute 01/29/2024 0.15  0.00 - 0.44 k/uL Final    Basophils Absolute 01/29/2024 0.05  0.00 - 0.20 k/uL Final    Absolute Immature Granulocyte 01/29/2024 <0.03  0.00 - 0.30 k/uL Final    Sodium 01/29/2024 140  135 - 144 mmol/L Final    Potassium 01/29/2024 4.9  3.7 - 5.3 mmol/L Final    Chloride 01/29/2024 102  98 - 107 mmol/L Final    CO2 01/29/2024 29  20 - 31 mmol/L Final    Anion Gap 01/29/2024 9  9 - 17 mmol/L Final    Glucose 01/29/2024 111 (H)  70 - 99 mg/dL Final    BUN 01/29/2024 16  8 - 23 mg/dL Final    Creatinine 01/29/2024 0.7  0.5 - 0.9 mg/dL Final    Est, Glom Filt Rate 01/29/2024 >60  >60 mL/min/1.73m2 Final    Comment:       These results are not intended for use in patients <18 years of age.        eGFR results are calculated without a race factor using the 2021 CKD-EPI

## 2024-02-14 DIAGNOSIS — E03.9 ACQUIRED HYPOTHYROIDISM: ICD-10-CM

## 2024-02-14 DIAGNOSIS — I63.012 CEREBROVASCULAR ACCIDENT (CVA) DUE TO THROMBOSIS OF LEFT VERTEBRAL ARTERY (HCC): ICD-10-CM

## 2024-02-14 RX ORDER — LEVOTHYROXINE SODIUM 112 UG/1
112 TABLET ORAL DAILY
Qty: 90 TABLET | Refills: 1 | Status: SHIPPED | OUTPATIENT
Start: 2024-02-14

## 2024-02-14 RX ORDER — CLOPIDOGREL BISULFATE 75 MG/1
TABLET ORAL
Qty: 90 TABLET | Refills: 1 | Status: SHIPPED | OUTPATIENT
Start: 2024-02-14

## 2024-02-14 NOTE — TELEPHONE ENCOUNTER
Elsy called requesting a refill of the below medication which has been pended for you:     Requested Prescriptions     Pending Prescriptions Disp Refills    levothyroxine (SYNTHROID) 112 MCG tablet [Pharmacy Med Name: LEVOTHYROXINE 112 MCG TABLET] 90 tablet 1     Sig: TAKE 1 TABLET BY MOUTH DAILY    clopidogrel (PLAVIX) 75 MG tablet [Pharmacy Med Name: CLOPIDOGREL 75 MG TABLET] 90 tablet 1     Sig: TAKE 1 TABLET BY MOUTH DAILY       Last Appointment Date: 1/31/2024  Next Appointment Date: 7/31/2024    No Known Allergies

## 2024-04-19 DIAGNOSIS — E78.2 MIXED HYPERLIPIDEMIA: ICD-10-CM

## 2024-04-19 RX ORDER — ATORVASTATIN CALCIUM 40 MG/1
TABLET, FILM COATED ORAL
Qty: 90 TABLET | Refills: 1 | Status: SHIPPED | OUTPATIENT
Start: 2024-04-19

## 2024-04-19 NOTE — TELEPHONE ENCOUNTER
Elsy called requesting a refill of the below medication which has been pended for you:     Requested Prescriptions     Pending Prescriptions Disp Refills    atorvastatin (LIPITOR) 40 MG tablet [Pharmacy Med Name: ATORVASTATIN 40 MG TABLET] 90 tablet 0     Sig: TAKE 1 TABLET BY MOUTH EVERY NIGHT AT BEDTIME       Last Appointment Date: 1/31/2024  Next Appointment Date: 7/31/2024    No Known Allergies

## 2024-04-26 RX ORDER — CARVEDILOL 3.12 MG/1
3.12 TABLET ORAL 2 TIMES DAILY
Qty: 180 TABLET | Refills: 1 | Status: SHIPPED | OUTPATIENT
Start: 2024-04-26

## 2024-04-26 NOTE — TELEPHONE ENCOUNTER
Elsy called requesting a refill of the below medication which has been pended for you:     Requested Prescriptions     Pending Prescriptions Disp Refills    carvedilol (COREG) 3.125 MG tablet [Pharmacy Med Name: CARVEDILOL 3.125 MG TABLET] 180 tablet 0     Sig: TAKE 1 TABLET BY MOUTH TWICE A DAY       Last Appointment Date: 1/31/2024  Next Appointment Date: 7/31/2024    No Known Allergies

## 2024-05-01 ENCOUNTER — OFFICE VISIT (OUTPATIENT)
Dept: PRIMARY CARE CLINIC | Age: 69
End: 2024-05-01
Payer: MEDICARE

## 2024-05-01 VITALS
HEART RATE: 80 BPM | HEIGHT: 64 IN | TEMPERATURE: 98.1 F | OXYGEN SATURATION: 98 % | BODY MASS INDEX: 26.8 KG/M2 | WEIGHT: 157 LBS | SYSTOLIC BLOOD PRESSURE: 132 MMHG | DIASTOLIC BLOOD PRESSURE: 84 MMHG

## 2024-05-01 DIAGNOSIS — H10.32 ACUTE BACTERIAL CONJUNCTIVITIS OF LEFT EYE: Primary | ICD-10-CM

## 2024-05-01 PROCEDURE — 99212 OFFICE O/P EST SF 10 MIN: CPT

## 2024-05-01 RX ORDER — CIPROFLOXACIN HYDROCHLORIDE 3.5 MG/ML
1 SOLUTION/ DROPS TOPICAL 4 TIMES DAILY
Qty: 1 EACH | Refills: 0 | Status: SHIPPED | OUTPATIENT
Start: 2024-05-01 | End: 2024-05-08

## 2024-05-01 ASSESSMENT — VISUAL ACUITY: OU: 1

## 2024-05-01 ASSESSMENT — ENCOUNTER SYMPTOMS
EYE PAIN: 0
EYE REDNESS: 1
EYE DISCHARGE: 1
FOREIGN BODY SENSATION: 0
EYE ITCHING: 0

## 2024-05-01 NOTE — PROGRESS NOTES
for 7 days     Dispense:  1 each     Refill:  0     Apply drops as directed  Warm/cool compresses 20 minutes, 3 times a day  Do not wear contacts until symptoms have fully resolved  Return or follow up with PCP for continued or worsening symptoms    Discussed exam, POCT findings, plan of care, and follow-up at length with patient/guardian.  Reviewed all prescribed and recommended medications, administration and side effects. Encouraged patient to follow up with PCP or return to the clinic for no improvement and or worsening of symptoms. All questions were answered and they verbalized understanding and were agreeable with the plan.     Follow up as needed.        Electronically signed by NICKY Lambert CNP on 5/1/2024 at 11:04 AM

## 2024-05-01 NOTE — PATIENT INSTRUCTIONS
Apply drops as directed  Warm/cool compresses 20 minutes, 3 times a day  Do not wear contacts until symptoms have fully resolved  Return or follow up with PCP for continued or worsening symptoms

## 2024-05-10 DIAGNOSIS — I10 PRIMARY HYPERTENSION: ICD-10-CM

## 2024-05-10 RX ORDER — LOSARTAN POTASSIUM 25 MG/1
25 TABLET ORAL DAILY
Qty: 30 TABLET | Refills: 5 | Status: SHIPPED | OUTPATIENT
Start: 2024-05-10

## 2024-05-10 NOTE — TELEPHONE ENCOUNTER
Pt will be out in about 2 days. Please send today    [phone]    Last Appt:  11/1/2023  Next Appt:   11/6/2024  Med verified in Epic

## 2024-05-23 ENCOUNTER — TELEMEDICINE (OUTPATIENT)
Dept: FAMILY MEDICINE CLINIC | Age: 69
End: 2024-05-23

## 2024-05-23 DIAGNOSIS — Z00.00 MEDICARE ANNUAL WELLNESS VISIT, SUBSEQUENT: Primary | ICD-10-CM

## 2024-05-23 SDOH — ECONOMIC STABILITY: FOOD INSECURITY: WITHIN THE PAST 12 MONTHS, YOU WORRIED THAT YOUR FOOD WOULD RUN OUT BEFORE YOU GOT MONEY TO BUY MORE.: NEVER TRUE

## 2024-05-23 SDOH — ECONOMIC STABILITY: FOOD INSECURITY: WITHIN THE PAST 12 MONTHS, THE FOOD YOU BOUGHT JUST DIDN'T LAST AND YOU DIDN'T HAVE MONEY TO GET MORE.: NEVER TRUE

## 2024-05-23 SDOH — ECONOMIC STABILITY: INCOME INSECURITY: HOW HARD IS IT FOR YOU TO PAY FOR THE VERY BASICS LIKE FOOD, HOUSING, MEDICAL CARE, AND HEATING?: NOT HARD AT ALL

## 2024-05-23 ASSESSMENT — LIFESTYLE VARIABLES
HAS A RELATIVE, FRIEND, DOCTOR, OR ANOTHER HEALTH PROFESSIONAL EXPRESSED CONCERN ABOUT YOUR DRINKING OR SUGGESTED YOU CUT DOWN: NO
HOW OFTEN DURING THE LAST YEAR HAVE YOU FAILED TO DO WHAT WAS NORMALLY EXPECTED FROM YOU BECAUSE OF DRINKING: NEVER
HOW OFTEN DURING THE LAST YEAR HAVE YOU HAD A FEELING OF GUILT OR REMORSE AFTER DRINKING: NEVER
HOW OFTEN DO YOU HAVE A DRINK CONTAINING ALCOHOL: 4 OR MORE TIMES A WEEK
HOW OFTEN DURING THE LAST YEAR HAVE YOU NEEDED AN ALCOHOLIC DRINK FIRST THING IN THE MORNING TO GET YOURSELF GOING AFTER A NIGHT OF HEAVY DRINKING: NEVER
HAVE YOU OR SOMEONE ELSE BEEN INJURED AS A RESULT OF YOUR DRINKING: NO
HOW OFTEN DURING THE LAST YEAR HAVE YOU FOUND THAT YOU WERE NOT ABLE TO STOP DRINKING ONCE YOU HAD STARTED: NEVER
HOW MANY STANDARD DRINKS CONTAINING ALCOHOL DO YOU HAVE ON A TYPICAL DAY: 1 OR 2
HOW OFTEN DURING THE LAST YEAR HAVE YOU BEEN UNABLE TO REMEMBER WHAT HAPPENED THE NIGHT BEFORE BECAUSE YOU HAD BEEN DRINKING: NEVER

## 2024-05-23 ASSESSMENT — PATIENT HEALTH QUESTIONNAIRE - PHQ9
SUM OF ALL RESPONSES TO PHQ9 QUESTIONS 1 & 2: 0
SUM OF ALL RESPONSES TO PHQ QUESTIONS 1-9: 0
SUM OF ALL RESPONSES TO PHQ QUESTIONS 1-9: 0
1. LITTLE INTEREST OR PLEASURE IN DOING THINGS: NOT AT ALL
SUM OF ALL RESPONSES TO PHQ QUESTIONS 1-9: 0
2. FEELING DOWN, DEPRESSED OR HOPELESS: NOT AT ALL
SUM OF ALL RESPONSES TO PHQ QUESTIONS 1-9: 0

## 2024-05-23 NOTE — PROGRESS NOTES
Medicare Annual Wellness Visit    Elsy Mac is here for Medicare AWV (Subsequent; last 2/17/2023)    Assessment & Plan     Recommendations for Preventive Services Due: see orders and patient instructions/AVS.  Recommended screening schedule for the next 5-10 years is provided to the patient in written form: see Patient Instructions/AVS.     No follow-ups on file.     Subjective     Patient's complete Health Risk Assessment and screening values have been reviewed and are found in Flowsheets. The following problems were reviewed today and where indicated follow up appointments were made and/or referrals ordered.    Positive Risk Factor Screenings with Interventions:                      Advanced Directives:  Do you have a Living Will?: (!) No    Intervention:  has NO advanced directive - information provided          Objective      Patient-Reported Vitals  Patient-Reported Systolic (Top): 0 mmHg (patient does not routinely monitor BP at home; BP stable at 132/84 during recent office visit)  Patient-Reported Weight: 152lb  Patient-Reported Height: 5'4\"            No Known Allergies  Prior to Visit Medications    Medication Sig Taking? Authorizing Provider   losartan (COZAAR) 25 MG tablet Take 1 tablet by mouth daily Yes Carlos Valerio MD   carvedilol (COREG) 3.125 MG tablet TAKE 1 TABLET BY MOUTH TWICE A DAY Yes Aileen Bautista, DO   atorvastatin (LIPITOR) 40 MG tablet TAKE 1 TABLET BY MOUTH EVERY NIGHT AT BEDTIME Yes Aileen Bautista, DO   levothyroxine (SYNTHROID) 112 MCG tablet TAKE 1 TABLET BY MOUTH DAILY Yes Aileen Bautista, DO   clopidogrel (PLAVIX) 75 MG tablet TAKE 1 TABLET BY MOUTH DAILY Yes Aileen Bautista DO   Multiple Vitamin (MULTI VITAMIN DAILY PO) Take by mouth Yes Bhargav Le MD   calcium carbonate 600 MG TABS tablet Take 1 tablet by mouth daily Yes Bhargav Le MD   Probiotic Product (PROBIOTIC DAILY PO) Take by mouth Yes Bhargav Le MD   aspirin 81 MG

## 2024-07-11 ENCOUNTER — HOSPITAL ENCOUNTER (EMERGENCY)
Age: 69
Discharge: HOME | End: 2024-07-11
Attending: SPECIALIST
Payer: MEDICARE

## 2024-07-11 VITALS
HEART RATE: 89 BPM | SYSTOLIC BLOOD PRESSURE: 176 MMHG | OXYGEN SATURATION: 97 % | DIASTOLIC BLOOD PRESSURE: 84 MMHG | RESPIRATION RATE: 18 BRPM | TEMPERATURE: 98.4 F

## 2024-07-11 DIAGNOSIS — L76.21 POSTOPERATIVE HEMORRHAGE OF SKIN FOLLOWING DERMATOLOGIC PROCEDURE: Primary | ICD-10-CM

## 2024-07-11 PROCEDURE — 99282 EMERGENCY DEPT VISIT SF MDM: CPT

## 2024-07-11 ASSESSMENT — PAIN DESCRIPTION - LOCATION: LOCATION: HEAD

## 2024-07-11 ASSESSMENT — ENCOUNTER SYMPTOMS
NAUSEA: 0
SORE THROAT: 0
ABDOMINAL PAIN: 0
SHORTNESS OF BREATH: 0
COUGH: 0

## 2024-07-11 ASSESSMENT — PAIN DESCRIPTION - ORIENTATION: ORIENTATION: MID

## 2024-07-11 ASSESSMENT — PAIN SCALES - GENERAL: PAINLEVEL_OUTOF10: 8

## 2024-07-11 ASSESSMENT — PAIN - FUNCTIONAL ASSESSMENT: PAIN_FUNCTIONAL_ASSESSMENT: 0-10

## 2024-07-12 NOTE — ED PROVIDER NOTES
[facility]  EMERGENCY DEPARTMENT ENCOUNTER      Pt Name: Elsy Mac  MRN: [id number]  Birthdate 1955  Date of evaluation: 7/11/2024      CHIEF COMPLAINT       Chief Complaint   Patient presents with    Post-op Problem     Pt arrives with complaints of wound bleeding, had mohs procedure done this morning to remove skin cancer at 0830, pressure increased as day went on, now dressing saturated with blood, states once noticing blood on dressing pressure feeling started to be relieved, lt eye swollen, procedure done on forehead almost midline above nose         HISTORY OF PRESENT ILLNESS    Elsy Mac is a 68 y.o. 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  Patient had procedure done at about 8:30 AM in the morning and was discharged home around 9:30 AM.  She noticed bleeding on the dressing at about 7:30 PM tonight.  No history of fall or injuries.  Patient states she had 3 different scrapings done followed by several stitches in place.  She admits to taking Plavix and baby aspirin daily and last dose was this morning.  She has history of cerebrovascular accident in 2017 as well as heart murmur and valvular problem.  No history of fever or chills.  There are no exacerbating or relieving factors and patient has not taken any medications for the above symptoms.      REVIEW OF SYSTEMS       Review of Systems   Constitutional:  Negative for chills and fever.   HENT:  Negative for congestion and sore throat.    Eyes:  Negative for visual disturbance.   Respiratory:  Negative for cough and shortness of breath.    Cardiovascular:  Negative for chest pain and palpitations.   Gastrointestinal:  Negative for abdominal pain and nausea.   Genitourinary:  Negative for dysuria and frequency.   Skin:  Positive for wound.   Hematological:

## 2024-07-12 NOTE — ED NOTES
Patient stated that the bleeding has slowed way down and is only seeping in one small area today. She did speak with her surgeon and discussed follow up needs.

## 2024-07-12 NOTE — DISCHARGE INSTRUCTIONS
Please understand that at this time there is no evidence for a more serious underlying process, but that early in the process of an illness or injury, an emergency department workup can be falsely reassuring.  You should contact your family doctor within the next 48 hours for a follow up appointment    THANK YOU!!!    From [facility] and [facility]    On behalf of the Emergency Department staff at [facility], I would like to thank you for giving us the opportunity to address your health care needs and concerns.    We hope that during your visit, our service was delivered in a professional and caring manner. Please keep [facility] in mind as we walk with you down the path to your own personal wellness.     Please expect an automated text message or email from us so we can ask a few questions about your health and progress. Based on your answers, a clinician may call you back to offer help and instructions.    Please understand that early in the process of an illness or injury, an emergency department workup can be falsely reassuring.  If you notice any worsening, changing or persistent symptoms please call your family doctor or return to the ER immediately.     Tell us how we did during your visit at [email or web address]   and let us know about your experience

## 2024-07-31 ENCOUNTER — TELEPHONE (OUTPATIENT)
Dept: FAMILY MEDICINE CLINIC | Age: 69
End: 2024-07-31

## 2024-07-31 ENCOUNTER — HOSPITAL ENCOUNTER (OUTPATIENT)
Age: 69
Discharge: HOME | End: 2024-07-31
Payer: MEDICARE

## 2024-07-31 ENCOUNTER — OFFICE VISIT (OUTPATIENT)
Dept: FAMILY MEDICINE CLINIC | Age: 69
End: 2024-07-31

## 2024-07-31 VITALS
HEIGHT: 64 IN | RESPIRATION RATE: 18 BRPM | BODY MASS INDEX: 25.25 KG/M2 | DIASTOLIC BLOOD PRESSURE: 80 MMHG | HEART RATE: 72 BPM | WEIGHT: 147.9 LBS | SYSTOLIC BLOOD PRESSURE: 130 MMHG | OXYGEN SATURATION: 94 % | TEMPERATURE: 97.2 F

## 2024-07-31 DIAGNOSIS — E78.2 MIXED HYPERLIPIDEMIA: ICD-10-CM

## 2024-07-31 DIAGNOSIS — R73.01 IMPAIRED FASTING GLUCOSE: Primary | ICD-10-CM

## 2024-07-31 DIAGNOSIS — E03.9 ACQUIRED HYPOTHYROIDISM: ICD-10-CM

## 2024-07-31 DIAGNOSIS — R73.01 IMPAIRED FASTING GLUCOSE: ICD-10-CM

## 2024-07-31 DIAGNOSIS — Z12.31 ENCOUNTER FOR SCREENING MAMMOGRAM FOR MALIGNANT NEOPLASM OF BREAST: ICD-10-CM

## 2024-07-31 LAB
ALBUMIN SERPL-MCNC: 4.6 G/DL (ref 3.5–5.2)
ALBUMIN/GLOB SERPL: 1.8 {RATIO} (ref 1–2.5)
ALP SERPL-CCNC: 118 U/L (ref 35–104)
ALT SERPL-CCNC: 24 U/L (ref 5–33)
ANION GAP SERPL CALCULATED.3IONS-SCNC: 11 MMOL/L (ref 9–17)
AST SERPL-CCNC: 19 U/L
BASOPHILS # BLD: 0.03 K/UL (ref 0–0.2)
BASOPHILS NFR BLD: 1 % (ref 0–2)
BILIRUB SERPL-MCNC: 0.6 MG/DL (ref 0.3–1.2)
BUN SERPL-MCNC: 18 MG/DL (ref 8–23)
BUN/CREAT SERPL: 26 (ref 9–20)
CALCIUM SERPL-MCNC: 9.8 MG/DL (ref 8.6–10.4)
CHLORIDE SERPL-SCNC: 104 MMOL/L (ref 98–107)
CHOLEST SERPL-MCNC: 137 MG/DL (ref 0–199)
CHOLESTEROL/HDL RATIO: 3
CO2 SERPL-SCNC: 29 MMOL/L (ref 20–31)
CREAT SERPL-MCNC: 0.7 MG/DL (ref 0.5–0.9)
EOSINOPHIL # BLD: 0.13 K/UL (ref 0–0.44)
EOSINOPHILS RELATIVE PERCENT: 3 % (ref 1–4)
ERYTHROCYTE [DISTWIDTH] IN BLOOD BY AUTOMATED COUNT: 11.9 % (ref 11.8–14.4)
EST. AVERAGE GLUCOSE BLD GHB EST-MCNC: 111 MG/DL
GFR, ESTIMATED: >90 ML/MIN/1.73M2
GLUCOSE SERPL-MCNC: 107 MG/DL (ref 70–99)
HBA1C MFR BLD: 5.5 % (ref 4–6)
HCT VFR BLD AUTO: 39.1 % (ref 36.3–47.1)
HDLC SERPL-MCNC: 47 MG/DL
HGB BLD-MCNC: 13.4 G/DL (ref 11.9–15.1)
IMM GRANULOCYTES # BLD AUTO: <0.03 K/UL (ref 0–0.3)
IMM GRANULOCYTES NFR BLD: 0 %
LDLC SERPL CALC-MCNC: 42 MG/DL (ref 0–100)
LYMPHOCYTES NFR BLD: 2.05 K/UL (ref 1.1–3.7)
LYMPHOCYTES RELATIVE PERCENT: 46 % (ref 24–43)
MCH RBC QN AUTO: 32.3 PG (ref 25.2–33.5)
MCHC RBC AUTO-ENTMCNC: 34.3 G/DL (ref 25.2–33.5)
MCV RBC AUTO: 94.2 FL (ref 82.6–102.9)
MONOCYTES NFR BLD: 0.37 K/UL (ref 0.1–1.2)
MONOCYTES NFR BLD: 8 % (ref 3–12)
NEUTROPHILS NFR BLD: 42 % (ref 36–65)
NEUTS SEG NFR BLD: 1.85 K/UL (ref 1.5–8.1)
NRBC BLD-RTO: 0 PER 100 WBC
PLATELET # BLD AUTO: 199 K/UL (ref 138–453)
PMV BLD AUTO: 9.6 FL (ref 8.1–13.5)
POTASSIUM SERPL-SCNC: 4.6 MMOL/L (ref 3.7–5.3)
PROT SERPL-MCNC: 7.1 G/DL (ref 6.4–8.3)
RBC # BLD AUTO: 4.15 M/UL (ref 3.95–5.11)
SODIUM SERPL-SCNC: 144 MMOL/L (ref 135–144)
T4 FREE SERPL-MCNC: 1.3 NG/DL (ref 0.92–1.68)
TRIGL SERPL-MCNC: 244 MG/DL
TSH SERPL DL<=0.05 MIU/L-ACNC: 3.23 UIU/ML (ref 0.3–5)
VLDLC SERPL CALC-MCNC: 49 MG/DL
WBC OTHER # BLD: 4.4 K/UL (ref 3.5–11.3)

## 2024-07-31 PROCEDURE — 84439 ASSAY OF FREE THYROXINE: CPT

## 2024-07-31 PROCEDURE — 85025 COMPLETE CBC W/AUTO DIFF WBC: CPT

## 2024-07-31 PROCEDURE — 80061 LIPID PANEL: CPT

## 2024-07-31 PROCEDURE — 84443 ASSAY THYROID STIM HORMONE: CPT

## 2024-07-31 PROCEDURE — 80053 COMPREHEN METABOLIC PANEL: CPT

## 2024-07-31 PROCEDURE — 83036 HEMOGLOBIN GLYCOSYLATED A1C: CPT

## 2024-07-31 PROCEDURE — 36415 COLL VENOUS BLD VENIPUNCTURE: CPT

## 2024-07-31 RX ORDER — ICOSAPENT ETHYL 1 G/1
1 CAPSULE ORAL 2 TIMES DAILY
Qty: 60 CAPSULE | Refills: 5 | Status: SHIPPED | OUTPATIENT
Start: 2024-07-31

## 2024-07-31 ASSESSMENT — ENCOUNTER SYMPTOMS
TROUBLE SWALLOWING: 0
COUGH: 0
RHINORRHEA: 0
CONSTIPATION: 0
NAUSEA: 0
ABDOMINAL PAIN: 0
WHEEZING: 0
DIARRHEA: 0
EYE REDNESS: 0
VOMITING: 0
EYE DISCHARGE: 0
SORE THROAT: 0
SHORTNESS OF BREATH: 0
SINUS PRESSURE: 0

## 2024-07-31 NOTE — PROGRESS NOTES
07/31/2024 08:09 AM    K 4.6 07/31/2024 08:09 AM     07/31/2024 08:09 AM    CO2 29 07/31/2024 08:09 AM    BUN 18 07/31/2024 08:09 AM    CREATININE 0.7 07/31/2024 08:09 AM    GLUCOSE 107 07/31/2024 08:09 AM    CALCIUM 9.8 07/31/2024 08:09 AM       Lab Results   Component Value Date/Time    LABA1C 5.4 01/29/2024 08:53 AM          Other review of systems are as noted below.    Preventative measures are reviewed today. See health maintenance section for complete preventative plan of care.    Did review patient's med list, allergies, social history, fam history, pmhx and pshx today as noted in the record.      Review of Systems   Constitutional:  Negative for chills, fatigue and fever.   HENT:  Negative for congestion, ear pain, postnasal drip, rhinorrhea, sinus pressure, sore throat and trouble swallowing.    Eyes:  Negative for discharge and redness.   Respiratory:  Negative for cough, shortness of breath and wheezing.    Cardiovascular:  Negative for chest pain.   Gastrointestinal:  Negative for abdominal pain, constipation, diarrhea, nausea and vomiting.   Genitourinary:  Negative for dysuria, flank pain, frequency and urgency.   Musculoskeletal:  Negative for arthralgias, myalgias and neck pain.   Skin:  Negative for rash and wound.   Allergic/Immunologic: Negative for environmental allergies.   Neurological:  Negative for dizziness, weakness, light-headedness and headaches.   Hematological:  Negative for adenopathy.   Psychiatric/Behavioral: Negative.           Prior to Visit Medications    Medication Sig Taking? Authorizing Provider   losartan (COZAAR) 25 MG tablet Take 1 tablet by mouth daily  Carlos Valerio MD   carvedilol (COREG) 3.125 MG tablet TAKE 1 TABLET BY MOUTH TWICE A DAY  Aileen Bautista DO   atorvastatin (LIPITOR) 40 MG tablet TAKE 1 TABLET BY MOUTH EVERY NIGHT AT BEDTIME  Aileen Bautista,    levothyroxine (SYNTHROID) 112 MCG tablet TAKE 1 TABLET BY MOUTH DAILY  Lily

## 2024-07-31 NOTE — TELEPHONE ENCOUNTER
----- Message from Aileen Bautista DO sent at 7/31/2024  5:02 PM EDT -----  Notify patient that her HgBA1c was relatively normal.  Her cholesterol level did show high triglycerides so may want to see if Vascepa is covered by her insurance.  Vascepa 1gm 2 tabs daily.  If not could add Fish oil to her daily regimen.  2400mg daily.  I did have the rest of her labs when she was in for her visit this morning and did review them with her at that time.

## 2024-07-31 NOTE — TELEPHONE ENCOUNTER
Patient notified of remaining lab results and recommendations. Would like to try the vascepa-send to [facility].

## 2024-07-31 NOTE — TELEPHONE ENCOUNTER
Let patient that it says it is tier 4/5 when I send it.  Check with the pharmacy what her out of pocket cost would be and if too much just get a fish oil over the counter.

## 2024-09-06 ENCOUNTER — HOSPITAL ENCOUNTER (OUTPATIENT)
Dept: MAMMOGRAPHY | Age: 69
Discharge: HOME | End: 2024-09-08
Attending: FAMILY MEDICINE
Payer: MEDICARE

## 2024-09-06 VITALS — BODY MASS INDEX: 25.23 KG/M2 | WEIGHT: 147 LBS

## 2024-09-06 DIAGNOSIS — Z12.31 SCREENING MAMMOGRAM FOR BREAST CANCER: ICD-10-CM

## 2024-09-06 PROCEDURE — 77063 BREAST TOMOSYNTHESIS BI: CPT

## 2024-10-01 DIAGNOSIS — E03.9 ACQUIRED HYPOTHYROIDISM: ICD-10-CM

## 2024-10-01 DIAGNOSIS — I63.012 CEREBROVASCULAR ACCIDENT (CVA) DUE TO THROMBOSIS OF LEFT VERTEBRAL ARTERY (HCC): ICD-10-CM

## 2024-10-01 RX ORDER — LEVOTHYROXINE SODIUM 112 UG/1
112 TABLET ORAL DAILY
Qty: 90 TABLET | Refills: 1 | Status: SHIPPED | OUTPATIENT
Start: 2024-10-01

## 2024-10-01 RX ORDER — CLOPIDOGREL BISULFATE 75 MG/1
TABLET ORAL
Qty: 90 TABLET | Refills: 1 | Status: SHIPPED | OUTPATIENT
Start: 2024-10-01

## 2024-10-01 NOTE — TELEPHONE ENCOUNTER
Elsy called requesting a refill of the below medication which has been pended for you:     Requested Prescriptions     Pending Prescriptions Disp Refills    clopidogrel (PLAVIX) 75 MG tablet [Pharmacy Med Name: CLOPIDOGREL 75 MG TABLET] 90 tablet 1     Sig: TAKE 1 TABLET BY MOUTH DAILY    levothyroxine (SYNTHROID) 112 MCG tablet [Pharmacy Med Name: LEVOTHYROXINE 112 MCG TABLET] 90 tablet 1     Sig: TAKE 1 TABLET BY MOUTH DAILY       Last Appointment Date: 7/31/2024  Next Appointment Date: 1/31/2025    No Known Allergies

## 2024-10-24 DIAGNOSIS — E78.2 MIXED HYPERLIPIDEMIA: ICD-10-CM

## 2024-10-24 RX ORDER — CARVEDILOL 3.12 MG/1
3.12 TABLET ORAL 2 TIMES DAILY
Qty: 180 TABLET | Refills: 1 | Status: SHIPPED | OUTPATIENT
Start: 2024-10-24

## 2024-10-24 RX ORDER — ATORVASTATIN CALCIUM 40 MG/1
TABLET, FILM COATED ORAL
Qty: 90 TABLET | Refills: 1 | Status: SHIPPED | OUTPATIENT
Start: 2024-10-24

## 2024-10-24 NOTE — TELEPHONE ENCOUNTER
Elsy called requesting a refill of the below medication which has been pended for you:     Requested Prescriptions     Pending Prescriptions Disp Refills    atorvastatin (LIPITOR) 40 MG tablet [Pharmacy Med Name: ATORVASTATIN 40 MG TABLET] 90 tablet 1     Sig: TAKE 1 TABLET BY MOUTH EVERY NIGHT AT BEDTIME    carvedilol (COREG) 3.125 MG tablet [Pharmacy Med Name: CARVEDILOL 3.125 MG TABLET] 180 tablet 1     Sig: TAKE 1 TABLET BY MOUTH 2 TIMES A DAY       Last Appointment Date: 7/31/2024  Next Appointment Date: 1/31/2025    No Known Allergies

## 2024-11-08 DIAGNOSIS — I10 PRIMARY HYPERTENSION: ICD-10-CM

## 2024-11-10 RX ORDER — LOSARTAN POTASSIUM 25 MG/1
25 TABLET ORAL DAILY
Qty: 90 TABLET | Refills: 3 | Status: SHIPPED | OUTPATIENT
Start: 2024-11-10

## 2024-12-11 ENCOUNTER — OFFICE VISIT (OUTPATIENT)
Dept: CARDIOLOGY | Age: 69
End: 2024-12-11
Payer: MEDICARE

## 2024-12-11 VITALS
DIASTOLIC BLOOD PRESSURE: 58 MMHG | BODY MASS INDEX: 26.02 KG/M2 | HEIGHT: 64 IN | HEART RATE: 88 BPM | WEIGHT: 152.38 LBS | SYSTOLIC BLOOD PRESSURE: 122 MMHG

## 2024-12-11 DIAGNOSIS — I63.012 CEREBROVASCULAR ACCIDENT (CVA) DUE TO THROMBOSIS OF LEFT VERTEBRAL ARTERY (HCC): ICD-10-CM

## 2024-12-11 DIAGNOSIS — I35.1 AORTIC VALVE INSUFFICIENCY, ETIOLOGY OF CARDIAC VALVE DISEASE UNSPECIFIED: Primary | ICD-10-CM

## 2024-12-11 DIAGNOSIS — E78.2 MIXED HYPERLIPIDEMIA: ICD-10-CM

## 2024-12-11 DIAGNOSIS — I10 PRIMARY HYPERTENSION: ICD-10-CM

## 2024-12-11 PROCEDURE — 93010 ELECTROCARDIOGRAM REPORT: CPT | Performed by: INTERNAL MEDICINE

## 2024-12-11 PROCEDURE — 1090F PRES/ABSN URINE INCON ASSESS: CPT | Performed by: INTERNAL MEDICINE

## 2024-12-11 PROCEDURE — 99214 OFFICE O/P EST MOD 30 MIN: CPT | Performed by: INTERNAL MEDICINE

## 2024-12-11 PROCEDURE — 3078F DIAST BP <80 MM HG: CPT | Performed by: INTERNAL MEDICINE

## 2024-12-11 PROCEDURE — 3074F SYST BP LT 130 MM HG: CPT | Performed by: INTERNAL MEDICINE

## 2024-12-11 PROCEDURE — 3017F COLORECTAL CA SCREEN DOC REV: CPT | Performed by: INTERNAL MEDICINE

## 2024-12-11 PROCEDURE — 93005 ELECTROCARDIOGRAM TRACING: CPT | Performed by: INTERNAL MEDICINE

## 2024-12-11 PROCEDURE — G8484 FLU IMMUNIZE NO ADMIN: HCPCS | Performed by: INTERNAL MEDICINE

## 2024-12-11 PROCEDURE — 1123F ACP DISCUSS/DSCN MKR DOCD: CPT | Performed by: INTERNAL MEDICINE

## 2024-12-11 PROCEDURE — G8400 PT W/DXA NO RESULTS DOC: HCPCS | Performed by: INTERNAL MEDICINE

## 2024-12-11 PROCEDURE — G8417 CALC BMI ABV UP PARAM F/U: HCPCS | Performed by: INTERNAL MEDICINE

## 2024-12-11 PROCEDURE — 1036F TOBACCO NON-USER: CPT | Performed by: INTERNAL MEDICINE

## 2024-12-11 PROCEDURE — G8427 DOCREV CUR MEDS BY ELIG CLIN: HCPCS | Performed by: INTERNAL MEDICINE

## 2024-12-11 PROCEDURE — 1159F MED LIST DOCD IN RCRD: CPT | Performed by: INTERNAL MEDICINE

## 2024-12-11 RX ORDER — BACILLUS COAGULANS/INULIN 1B-250 MG
CAPSULE ORAL
COMMUNITY

## 2024-12-11 NOTE — PROGRESS NOTES
Today's Date: 12/11/2024  Patient's Name: Elsy Mac  Patient's age: 69 y.o., 1955    Subjective:  Elsy Mac is being seen in clinic today regarding   Chief Complaint   Patient presents with    Annual Exam     CVA yearly check   Aortic valve regurgitation, hypertension, hyperlipidemia      she is doing well from a cardiac standpoint. She reports to be fairly active. No chest pain, no dyspnea, no PND, no syncope or pre-syncope, no orthopnea.        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    Past Surgical History:   has a past surgical history that includes Colonoscopy (05/2008); Insertable Cardiac Monitor (06/12/2017); Tonsillectomy; and pr colonoscopy flx dx w/collj spec when pfrmd (N/A, 7/11/2018).    Home Medications:  Prior to Admission medications    Medication Sig Start Date End Date Taking? Authorizing Provider   Bacillus Coagulans-Inulin (PROBIOTIC) 1-250 BILLION-MG CAPS 1 tab by mouth daily   Yes Provider, MD Bhargav   losartan (COZAAR) 25 MG tablet TAKE 1 TABLET BY MOUTH DAILY 11/10/24  Yes Salina Colon, NICKY - NP   atorvastatin (LIPITOR) 40 MG tablet TAKE 1 TABLET BY MOUTH EVERY NIGHT AT BEDTIME 10/24/24  Yes Aileen Bautista, DO   carvedilol (COREG) 3.125 MG tablet TAKE 1 TABLET BY MOUTH 2 TIMES A DAY 10/24/24  Yes Aileen Bautista, DO   clopidogrel (PLAVIX) 75 MG tablet TAKE 1 TABLET BY MOUTH DAILY 10/1/24  Yes Aileen Bautista, DO   levothyroxine (SYNTHROID) 112 MCG tablet TAKE 1 TABLET BY MOUTH DAILY 10/1/24  Yes Aileen Bautista, DO   Icosapent Ethyl (VASCEPA) 1 g CAPS capsule Take 1 capsule by mouth 2 times daily 7/31/24  Yes Aileen Bautista, DO   Multiple Vitamin (MULTI VITAMIN DAILY

## 2025-01-14 ENCOUNTER — TELEPHONE (OUTPATIENT)
Dept: CARDIOLOGY | Age: 70
End: 2025-01-14

## 2025-01-14 ENCOUNTER — HOSPITAL ENCOUNTER (OUTPATIENT)
Age: 70
Discharge: HOME | End: 2025-01-16
Attending: INTERNAL MEDICINE
Payer: MEDICARE

## 2025-01-14 VITALS — HEIGHT: 64 IN | WEIGHT: 152 LBS | BODY MASS INDEX: 25.95 KG/M2

## 2025-01-14 DIAGNOSIS — I35.1 AORTIC VALVE INSUFFICIENCY, ETIOLOGY OF CARDIAC VALVE DISEASE UNSPECIFIED: ICD-10-CM

## 2025-01-14 LAB
ECHO AO ROOT DIAM: 2.9 CM
ECHO AO ROOT INDEX: 1.67 CM/M2
ECHO AV AREA PEAK VELOCITY: 1.4 CM2
ECHO AV AREA VTI: 1.3 CM2
ECHO AV AREA/BSA PEAK VELOCITY: 0.8 CM2/M2
ECHO AV AREA/BSA VTI: 0.7 CM2/M2
ECHO AV MEAN GRADIENT: 17 MMHG
ECHO AV MEAN VELOCITY: 2 M/S
ECHO AV PEAK GRADIENT: 29 MMHG
ECHO AV PEAK VELOCITY: 2.7 M/S
ECHO AV VELOCITY RATIO: 0.7
ECHO AV VTI: 52.1 CM
ECHO BSA: 1.76 M2
ECHO EST RA PRESSURE: 3 MMHG
ECHO LA AREA 2C: 14.4 CM2
ECHO LA AREA 4C: 14.3 CM2
ECHO LA DIAMETER INDEX: 2.01 CM/M2
ECHO LA DIAMETER: 3.5 CM
ECHO LA MAJOR AXIS: 4.5 CM
ECHO LA MINOR AXIS: 5.1 CM
ECHO LA TO AORTIC ROOT RATIO: 1.21
ECHO LA VOL BP: 37 ML (ref 22–52)
ECHO LA VOL MOD A2C: 33 ML (ref 22–52)
ECHO LA VOL MOD A4C: 38 ML (ref 22–52)
ECHO LA VOL/BSA BIPLANE: 21 ML/M2 (ref 16–34)
ECHO LA VOLUME INDEX MOD A2C: 19 ML/M2 (ref 16–34)
ECHO LA VOLUME INDEX MOD A4C: 22 ML/M2 (ref 16–34)
ECHO LV E' LATERAL VELOCITY: 8.7 CM/S
ECHO LV E' SEPTAL VELOCITY: 4.79 CM/S
ECHO LV EF PHYSICIAN: 68 %
ECHO LV FRACTIONAL SHORTENING: 62 % (ref 28–44)
ECHO LV INTERNAL DIMENSION DIASTOLE INDEX: 2.59 CM/M2
ECHO LV INTERNAL DIMENSION DIASTOLIC: 4.5 CM (ref 3.9–5.3)
ECHO LV INTERNAL DIMENSION SYSTOLIC INDEX: 0.98 CM/M2
ECHO LV INTERNAL DIMENSION SYSTOLIC: 1.7 CM
ECHO LV IVSD: 1 CM (ref 0.6–0.9)
ECHO LV MASS 2D: 142.9 G (ref 67–162)
ECHO LV MASS INDEX 2D: 82.1 G/M2 (ref 43–95)
ECHO LV POSTERIOR WALL DIASTOLIC: 0.9 CM (ref 0.6–0.9)
ECHO LV RELATIVE WALL THICKNESS RATIO: 0.4
ECHO LVOT AREA: 2 CM2
ECHO LVOT AV VTI INDEX: 0.64
ECHO LVOT DIAM: 1.6 CM
ECHO LVOT MEAN GRADIENT: 7 MMHG
ECHO LVOT PEAK GRADIENT: 14 MMHG
ECHO LVOT PEAK VELOCITY: 1.9 M/S
ECHO LVOT STROKE VOLUME INDEX: 38.3 ML/M2
ECHO LVOT SV: 66.7 ML
ECHO LVOT VTI: 33.2 CM
ECHO MV A VELOCITY: 1.31 M/S
ECHO MV E DECELERATION TIME (DT): 214 MS
ECHO MV E VELOCITY: 0.84 M/S
ECHO MV E/A RATIO: 0.64
ECHO MV E/E' LATERAL: 9.66
ECHO MV E/E' RATIO (AVERAGED): 13.6
ECHO MV E/E' SEPTAL: 17.54
ECHO RIGHT VENTRICULAR SYSTOLIC PRESSURE (RVSP): 33 MMHG
ECHO TV REGURGITANT MAX VELOCITY: 2.73 M/S
ECHO TV REGURGITANT PEAK GRADIENT: 30 MMHG

## 2025-01-14 PROCEDURE — 93306 TTE W/DOPPLER COMPLETE: CPT

## 2025-01-14 PROCEDURE — 93306 TTE W/DOPPLER COMPLETE: CPT | Performed by: INTERNAL MEDICINE

## 2025-01-14 NOTE — TELEPHONE ENCOUNTER
Pt notified of echo results. Pt was advised to keep all upcoming appts and call the office with any problems or concerns. Pt verbalized understanding and had no further questions at the time.

## 2025-01-14 NOTE — TELEPHONE ENCOUNTER
Interpretation Summary      Left Ventricle: Normal left ventricular systolic function. EF by visual approximation is 68%. Left ventricle size is normal. Normal wall thickness. Normal wall motion. Normal diastolic function.    Aortic Valve: Mild sclerosis of the aortic valve cusps. Mild regurgitation. Mild stenosis of the aortic valve. AV mean gradient is 17 mmHg. AV peak gradient is 29 mmHg. AV peak velocity is 2.7 m/s. AV area by continuity VTI is 1.3 cm2.    Mitral Valve: Mildly thickened leaflets. Mild regurgitation.    Tricuspid Valve: Mild regurgitation.    Image quality is technically difficult.     Echo Findings    Left Ventricle Normal left ventricular systolic function. EF by visual approximation is 68%. Left ventricle size is normal. Normal wall thickness. Normal wall motion. Normal diastolic function.   Left Atrium Left atrium size is normal. LA Vol Index is  21 ml/m2.   Right Ventricle Right ventricle size is normal. Normal systolic function.   Right Atrium Right atrium size is normal.   Aortic Valve Mild sclerosis of the aortic valve cusps. Mild regurgitation. Mild stenosis of the aortic valve. AV mean gradient is 17 mmHg. AV peak gradient is 29 mmHg. AV peak velocity is 2.7 m/s. AV area by continuity VTI is 1.3 cm2.   Mitral Valve Mildly thickened leaflets. Mild regurgitation. No stenosis noted.   Tricuspid Valve Valve structure is normal. Mild regurgitation. No stenosis noted. Normal RVSP.   Pulmonic Valve The pulmonic valve visualization is suboptimal but appears to be functioning normally. Physiologically normal regurgitation. No stenosis noted.   Aorta Normal sized aortic root and ascending aorta.   IVC/Hepatic Veins IVC diameter is less than or equal to 21 mm and decreases greater than 50% during inspiration; therefore the estimated right atrial pressure is normal (~3 mmHg). IVC size is normal.   Pericardium No pericardial effusion.

## 2025-01-27 ENCOUNTER — HOSPITAL ENCOUNTER (OUTPATIENT)
Age: 70
Discharge: HOME | End: 2025-01-27
Payer: MEDICARE

## 2025-01-27 DIAGNOSIS — E78.2 MIXED HYPERLIPIDEMIA: ICD-10-CM

## 2025-01-27 DIAGNOSIS — E03.9 ACQUIRED HYPOTHYROIDISM: ICD-10-CM

## 2025-01-27 DIAGNOSIS — R73.01 IMPAIRED FASTING GLUCOSE: ICD-10-CM

## 2025-01-27 LAB
ALBUMIN SERPL-MCNC: 4.5 G/DL (ref 3.5–5.2)
ALBUMIN/GLOB SERPL: 1.8 {RATIO} (ref 1–2.5)
ALP SERPL-CCNC: 99 U/L (ref 35–104)
ALT SERPL-CCNC: 22 U/L (ref 5–33)
ANION GAP SERPL CALCULATED.3IONS-SCNC: 10 MMOL/L (ref 9–17)
AST SERPL-CCNC: 21 U/L
BASOPHILS # BLD: 0.03 K/UL (ref 0–0.2)
BASOPHILS NFR BLD: 1 % (ref 0–2)
BILIRUB SERPL-MCNC: 0.8 MG/DL (ref 0.3–1.2)
BUN SERPL-MCNC: 18 MG/DL (ref 8–23)
BUN/CREAT SERPL: 26 (ref 9–20)
CALCIUM SERPL-MCNC: 9.8 MG/DL (ref 8.6–10.4)
CHLORIDE SERPL-SCNC: 105 MMOL/L (ref 98–107)
CHOLEST SERPL-MCNC: 153 MG/DL (ref 0–199)
CHOLESTEROL/HDL RATIO: 2.7
CO2 SERPL-SCNC: 26 MMOL/L (ref 20–31)
CREAT SERPL-MCNC: 0.7 MG/DL (ref 0.5–0.9)
EOSINOPHIL # BLD: 0.12 K/UL (ref 0–0.44)
EOSINOPHILS RELATIVE PERCENT: 3 % (ref 1–4)
ERYTHROCYTE [DISTWIDTH] IN BLOOD BY AUTOMATED COUNT: 12.4 % (ref 11.8–14.4)
EST. AVERAGE GLUCOSE BLD GHB EST-MCNC: 108 MG/DL
GFR, ESTIMATED: >90 ML/MIN/1.73M2
GLUCOSE SERPL-MCNC: 99 MG/DL (ref 70–99)
HBA1C MFR BLD: 5.4 % (ref 4–6)
HCT VFR BLD AUTO: 41 % (ref 36.3–47.1)
HDLC SERPL-MCNC: 57 MG/DL
HGB BLD-MCNC: 13.7 G/DL (ref 11.9–15.1)
IMM GRANULOCYTES # BLD AUTO: <0.03 K/UL (ref 0–0.3)
IMM GRANULOCYTES NFR BLD: 0 %
LDLC SERPL CALC-MCNC: 48 MG/DL (ref 0–100)
LYMPHOCYTES NFR BLD: 1.99 K/UL (ref 1.1–3.7)
LYMPHOCYTES RELATIVE PERCENT: 52 % (ref 24–43)
MCH RBC QN AUTO: 32.1 PG (ref 25.2–33.5)
MCHC RBC AUTO-ENTMCNC: 33.4 G/DL (ref 25.2–33.5)
MCV RBC AUTO: 96 FL (ref 82.6–102.9)
MONOCYTES NFR BLD: 0.34 K/UL (ref 0.1–1.2)
MONOCYTES NFR BLD: 9 % (ref 3–12)
NEUTROPHILS NFR BLD: 35 % (ref 36–65)
NEUTS SEG NFR BLD: 1.32 K/UL (ref 1.5–8.1)
NRBC BLD-RTO: 0 PER 100 WBC
PLATELET # BLD AUTO: 218 K/UL (ref 138–453)
PMV BLD AUTO: 9.6 FL (ref 8.1–13.5)
POTASSIUM SERPL-SCNC: 4.3 MMOL/L (ref 3.7–5.3)
PROT SERPL-MCNC: 7 G/DL (ref 6.4–8.3)
RBC # BLD AUTO: 4.27 M/UL (ref 3.95–5.11)
SODIUM SERPL-SCNC: 141 MMOL/L (ref 135–144)
T4 FREE SERPL-MCNC: 1.2 NG/DL (ref 0.9–1.7)
TRIGL SERPL-MCNC: 240 MG/DL
TSH SERPL DL<=0.05 MIU/L-ACNC: 2.42 UIU/ML (ref 0.3–5)
VLDLC SERPL CALC-MCNC: 48 MG/DL (ref 1–30)
WBC OTHER # BLD: 3.8 K/UL (ref 3.5–11.3)

## 2025-01-27 PROCEDURE — 80061 LIPID PANEL: CPT

## 2025-01-27 PROCEDURE — 80053 COMPREHEN METABOLIC PANEL: CPT

## 2025-01-27 PROCEDURE — 83036 HEMOGLOBIN GLYCOSYLATED A1C: CPT

## 2025-01-27 PROCEDURE — 36415 COLL VENOUS BLD VENIPUNCTURE: CPT

## 2025-01-27 PROCEDURE — 84443 ASSAY THYROID STIM HORMONE: CPT

## 2025-01-27 PROCEDURE — 84439 ASSAY OF FREE THYROXINE: CPT

## 2025-01-27 PROCEDURE — 85025 COMPLETE CBC W/AUTO DIFF WBC: CPT

## 2025-01-31 ENCOUNTER — OFFICE VISIT (OUTPATIENT)
Dept: FAMILY MEDICINE CLINIC | Age: 70
End: 2025-01-31

## 2025-01-31 VITALS
BODY MASS INDEX: 25.78 KG/M2 | WEIGHT: 151 LBS | OXYGEN SATURATION: 94 % | TEMPERATURE: 97 F | DIASTOLIC BLOOD PRESSURE: 74 MMHG | HEART RATE: 76 BPM | HEIGHT: 64 IN | SYSTOLIC BLOOD PRESSURE: 120 MMHG | RESPIRATION RATE: 16 BRPM

## 2025-01-31 DIAGNOSIS — R73.01 IMPAIRED FASTING GLUCOSE: ICD-10-CM

## 2025-01-31 DIAGNOSIS — E78.2 MIXED HYPERLIPIDEMIA: Primary | ICD-10-CM

## 2025-01-31 DIAGNOSIS — I63.012 CEREBROVASCULAR ACCIDENT (CVA) DUE TO THROMBOSIS OF LEFT VERTEBRAL ARTERY (HCC): ICD-10-CM

## 2025-01-31 DIAGNOSIS — M25.551 RIGHT HIP PAIN: ICD-10-CM

## 2025-01-31 DIAGNOSIS — E03.9 ACQUIRED HYPOTHYROIDISM: ICD-10-CM

## 2025-01-31 RX ORDER — PREDNISONE 20 MG/1
TABLET ORAL
Qty: 15 TABLET | Refills: 0 | Status: SHIPPED | OUTPATIENT
Start: 2025-01-31

## 2025-01-31 SDOH — ECONOMIC STABILITY: FOOD INSECURITY: WITHIN THE PAST 12 MONTHS, THE FOOD YOU BOUGHT JUST DIDN'T LAST AND YOU DIDN'T HAVE MONEY TO GET MORE.: NEVER TRUE

## 2025-01-31 SDOH — ECONOMIC STABILITY: FOOD INSECURITY: WITHIN THE PAST 12 MONTHS, YOU WORRIED THAT YOUR FOOD WOULD RUN OUT BEFORE YOU GOT MONEY TO BUY MORE.: NEVER TRUE

## 2025-01-31 ASSESSMENT — ENCOUNTER SYMPTOMS
VOMITING: 0
RHINORRHEA: 0
SHORTNESS OF BREATH: 0
SORE THROAT: 0
DIARRHEA: 0
EYE REDNESS: 0
TROUBLE SWALLOWING: 0
CONSTIPATION: 0
ABDOMINAL PAIN: 0
EYE DISCHARGE: 0
NAUSEA: 0
WHEEZING: 0
SINUS PRESSURE: 0
COUGH: 0

## 2025-01-31 ASSESSMENT — PATIENT HEALTH QUESTIONNAIRE - PHQ9
1. LITTLE INTEREST OR PLEASURE IN DOING THINGS: NOT AT ALL
SUM OF ALL RESPONSES TO PHQ QUESTIONS 1-9: 0
SUM OF ALL RESPONSES TO PHQ QUESTIONS 1-9: 0
2. FEELING DOWN, DEPRESSED OR HOPELESS: NOT AT ALL
SUM OF ALL RESPONSES TO PHQ QUESTIONS 1-9: 0
SUM OF ALL RESPONSES TO PHQ QUESTIONS 1-9: 0
SUM OF ALL RESPONSES TO PHQ9 QUESTIONS 1 & 2: 0

## 2025-01-31 NOTE — PROGRESS NOTES
2025     Elsy Mac (:  1955) is a 69 y.o. female, here for evaluation of the following medical concerns:    HPI  History of Present Illness  The patient is a 69-year-old female here for a 6-month follow-up of hyperlipidemia, hypothyroidism, impaired fasting glucose, and history of CVA.    She reports persistent hip pain, which she attributes to increased physical activity related to assisting her  post-hip replacement surgery. The pain is localized to the outer aspect of her hip, a change from her previous experience with bilateral sciatica. She typically sleeps on her left side and finds that the pain improves with movement but is accompanied by stiffness upon rising. Despite these symptoms, she maintains good flexibility in her hip. She has been managing the pain with Tylenol for several months but notes no significant improvement. She also reports a sensation of tightness in her hip girdle muscles. She expresses interest in trying oral steroids for short-term relief, as she anticipates prolonged sitting during an upcoming 3-day drive.    She has been taking over-the-counter fish oil supplements in response to elevated triglyceride levels.    She underwent an echocardiogram as part of a routine recheck, having not had one for the past 2 years.    Supplemental Information  She has a history of knee discomfort, which she manages by avoiding kneeling. She also reports crepitus in her knees.        Did review patient's med list, allergies, social history, fam history, pmhx and pshx today as noted in the record.       Patient's recent lab reports are as follows:    Results for orders placed or performed during the hospital encounter of 25   TSH   Result Value Ref Range    TSH 2.42 0.30 - 5.00 uIU/mL   T4, Free   Result Value Ref Range    T4 Free 1.2 0.9 - 1.7 ng/dL   Lipid Panel   Result Value Ref Range    Cholesterol, Total 153 0 - 199 mg/dL    HDL 57 >40 mg/dL    LDL Cholesterol 48

## 2025-01-31 NOTE — PATIENT INSTRUCTIONS
Hospital Outpatient Visit on 01/27/2025   Component Date Value Ref Range Status    TSH 01/27/2025 2.42  0.30 - 5.00 uIU/mL Final    T4 Free 01/27/2025 1.2  0.9 - 1.7 ng/dL Final    Cholesterol, Total 01/27/2025 153  0 - 199 mg/dL Final    Comment:    Cholesterol Guidelines:      <200  Desirable   200-240  Borderline      >240  Undesirable         HDL 01/27/2025 57  >40 mg/dL Final    Comment:    HDL Guidelines:    <40     Undesirable   40-59    Borderline    >59     Desirable         LDL Cholesterol 01/27/2025 48  0 - 100 mg/dL Final    Comment:    LDL Guidelines:     <100    Desirable   100-129   Near to/above Desirable   130-159   Borderline      >159   Undesirable     Direct (measured) LDL and calculated LDL are not interchangeable tests.      Chol/HDL Ratio 01/27/2025 2.7   Final    Triglycerides 01/27/2025 240 (H)  <150 mg/dL Final    Comment:    Triglyceride Guidelines:     <150   Desirable   150-199  Borderline   200-499  High     >499   Very high   Based on AHA Guidelines for fasting triglyceride, October 2012.         VLDL 01/27/2025 48 (H)  1 - 30 mg/dL Final    Hemoglobin A1C 01/27/2025 5.4  4.0 - 6.0 % Final    Estimated Avg Glucose 01/27/2025 108  mg/dL Final    Comment: The ADA and AACC recommend providing the estimated average glucose result to permit better   patient understanding of their HBA1c result.      Sodium 01/27/2025 141  135 - 144 mmol/L Final    Potassium 01/27/2025 4.3  3.7 - 5.3 mmol/L Final    Chloride 01/27/2025 105  98 - 107 mmol/L Final    CO2 01/27/2025 26  20 - 31 mmol/L Final    Anion Gap 01/27/2025 10  9 - 17 mmol/L Final    Glucose 01/27/2025 99  70 - 99 mg/dL Final    BUN 01/27/2025 18  8 - 23 mg/dL Final    Creatinine 01/27/2025 0.7  0.5 - 0.9 mg/dL Final    Est, Glom Filt Rate 01/27/2025 >90  >60 mL/min/1.73m2 Final    Comment:       These results are not intended for use in patients <18 years of age.        eGFR results are calculated without a race factor using the 2021

## 2025-03-31 DIAGNOSIS — E03.9 ACQUIRED HYPOTHYROIDISM: ICD-10-CM

## 2025-03-31 DIAGNOSIS — I63.012 CEREBROVASCULAR ACCIDENT (CVA) DUE TO THROMBOSIS OF LEFT VERTEBRAL ARTERY (HCC): ICD-10-CM

## 2025-03-31 RX ORDER — CLOPIDOGREL BISULFATE 75 MG/1
TABLET ORAL
Qty: 90 TABLET | Refills: 1 | Status: SHIPPED | OUTPATIENT
Start: 2025-03-31

## 2025-03-31 RX ORDER — LEVOTHYROXINE SODIUM 112 UG/1
112 TABLET ORAL DAILY
Qty: 90 TABLET | Refills: 1 | Status: SHIPPED | OUTPATIENT
Start: 2025-03-31

## 2025-04-09 ENCOUNTER — TELEPHONE (OUTPATIENT)
Dept: FAMILY MEDICINE CLINIC | Age: 70
End: 2025-04-09
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
## 2025-04-10 ENCOUNTER — OFFICE VISIT (OUTPATIENT)
Dept: FAMILY MEDICINE CLINIC | Age: 70
End: 2025-04-10

## 2025-04-10 VITALS
SYSTOLIC BLOOD PRESSURE: 120 MMHG | WEIGHT: 158.2 LBS | HEART RATE: 104 BPM | OXYGEN SATURATION: 98 % | DIASTOLIC BLOOD PRESSURE: 76 MMHG | HEIGHT: 64 IN | BODY MASS INDEX: 27.01 KG/M2 | RESPIRATION RATE: 16 BRPM | TEMPERATURE: 97.6 F
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
## 2025-04-10 DIAGNOSIS — Z09 HOSPITAL DISCHARGE FOLLOW-UP: ICD-10-CM

## 2025-04-10 DIAGNOSIS — S70.01XD TRAUMATIC HEMATOMA OF RIGHT HIP, SUBSEQUENT ENCOUNTER: ICD-10-CM

## 2025-04-10 RX ORDER — HYDROCODONE BITARTRATE AND ACETAMINOPHEN 5; 325 MG/1; MG/1
1 TABLET ORAL EVERY 6 HOURS PRN
Qty: 28 TABLET | Refills: 0 | Status: SHIPPED | OUTPATIENT
Start: 2025-04-10 | End: 2025-04-17

## 2025-04-10 RX ORDER — PREDNISONE 20 MG/1
TABLET ORAL
Qty: 15 TABLET | Refills: 0 | Status: SHIPPED | OUTPATIENT
Start: 2025-04-10

## 2025-04-10 ASSESSMENT — ENCOUNTER SYMPTOMS
DIARRHEA: 0
VOMITING: 0
ABDOMINAL PAIN: 1
BACK PAIN: 1
NAUSEA: 0
CONSTIPATION: 0
ABDOMINAL DISTENTION: 1

## 2025-04-10 NOTE — PROGRESS NOTES
bleeding.  - Advised to keep the wounds covered; avoid excessive moisture to prevent gumminess.  - Provided Coban wrap for the knee and elbow wounds.    3. Pain management.  - Patient currently alternating between ibuprofen and Tylenol for pain relief.  - Discussed the effectiveness of current pain management; Advised against the use of ibuprofen as she is on plavix.  Will give Norco to use for more severe pain.  Did give short course of steroid to help with any inflammation and swelling as she really should avoid NSAIDs.    Return  if no improvement in symptoms or if any further symptoms arise.  Otherwise will follow up as scheduled for her routine medical follow up visit.        The patient (or guardian, if applicable) and other individuals in attendance with the patient were advised that Artificial Intelligence will be utilized during this visit to record, process the conversation to generate a clinical note, and support improvement of the AI technology. The patient (or guardian, if applicable) and other individuals in attendance at the appointment consented to the use of AI, including the recording.          An electronic signature was used to authenticate this note.  --Aileen Bautista, DO

## 2025-04-22 DIAGNOSIS — E78.2 MIXED HYPERLIPIDEMIA: ICD-10-CM

## 2025-04-22 RX ORDER — CARVEDILOL 3.12 MG/1
3.12 TABLET ORAL 2 TIMES DAILY
Qty: 180 TABLET | Refills: 1 | Status: SHIPPED | OUTPATIENT
Start: 2025-04-22

## 2025-04-22 RX ORDER — ATORVASTATIN CALCIUM 40 MG/1
TABLET, FILM COATED ORAL
Qty: 90 TABLET | Refills: 1 | Status: SHIPPED | OUTPATIENT
Start: 2025-04-22

## 2025-04-22 NOTE — TELEPHONE ENCOUNTER
Elsy called requesting a refill of the below medication which has been pended for you:     Requested Prescriptions     Pending Prescriptions Disp Refills    carvedilol (COREG) 3.125 MG tablet 180 tablet 1     Sig: Take 1 tablet by mouth 2 times daily    atorvastatin (LIPITOR) 40 MG tablet 90 tablet 1     Sig: TAKE 1 TABLET BY MOUTH EVERY NIGHT AT BEDTIME       Last Appointment Date: 4/10/2025  Next Appointment Date: 7/31/2025    No Known Allergies

## 2025-05-29 ENCOUNTER — TELEMEDICINE (OUTPATIENT)
Dept: FAMILY MEDICINE CLINIC | Age: 70
End: 2025-05-29

## 2025-05-29 DIAGNOSIS — Z00.00 MEDICARE ANNUAL WELLNESS VISIT, SUBSEQUENT: Primary | ICD-10-CM

## 2025-05-29 ASSESSMENT — LIFESTYLE VARIABLES
HOW OFTEN DURING THE LAST YEAR HAVE YOU FOUND THAT YOU WERE NOT ABLE TO STOP DRINKING ONCE YOU HAD STARTED: NEVER
HAVE YOU OR SOMEONE ELSE BEEN INJURED AS A RESULT OF YOUR DRINKING: NO
HAS A RELATIVE, FRIEND, DOCTOR, OR ANOTHER HEALTH PROFESSIONAL EXPRESSED CONCERN ABOUT YOUR DRINKING OR SUGGESTED YOU CUT DOWN: NO
HOW OFTEN DO YOU HAVE A DRINK CONTAINING ALCOHOL: 4 OR MORE TIMES A WEEK
HOW MANY STANDARD DRINKS CONTAINING ALCOHOL DO YOU HAVE ON A TYPICAL DAY: 1 OR 2
HOW OFTEN DURING THE LAST YEAR HAVE YOU BEEN UNABLE TO REMEMBER WHAT HAPPENED THE NIGHT BEFORE BECAUSE YOU HAD BEEN DRINKING: NEVER
HOW OFTEN DURING THE LAST YEAR HAVE YOU HAD A FEELING OF GUILT OR REMORSE AFTER DRINKING: NEVER
HOW OFTEN DURING THE LAST YEAR HAVE YOU FAILED TO DO WHAT WAS NORMALLY EXPECTED FROM YOU BECAUSE OF DRINKING: NEVER
HOW OFTEN DURING THE LAST YEAR HAVE YOU NEEDED AN ALCOHOLIC DRINK FIRST THING IN THE MORNING TO GET YOURSELF GOING AFTER A NIGHT OF HEAVY DRINKING: NEVER

## 2025-05-29 ASSESSMENT — PATIENT HEALTH QUESTIONNAIRE - PHQ9
1. LITTLE INTEREST OR PLEASURE IN DOING THINGS: NOT AT ALL
SUM OF ALL RESPONSES TO PHQ QUESTIONS 1-9: 0
2. FEELING DOWN, DEPRESSED OR HOPELESS: NOT AT ALL

## 2025-05-29 NOTE — PROGRESS NOTES
Medicare Annual Wellness Visit    Elsy Mac is here for Medicare AWV (Subsequent; last 5/23/2024)    Assessment & Plan   Medicare annual wellness visit, subsequent     Return in 1 year (on 5/29/2026) for Medicare AWV.     Subjective     Patient's complete Health Risk Assessment and screening values have been reviewed and are found in Flowsheets. The following problems were reviewed today and where indicated follow up appointments were made and/or referrals ordered.    Positive Risk Factor Screenings with Interventions:                      Advanced Directives:  Do you have a Living Will?: (!) No    Intervention:  has NO advanced directive - information provided          Objective    Patient-Reported Vitals  Patient-Reported Systolic (Top): 0 mmHg (patient does not routinely monitor BP at home; during recent office visit BP stable at 120/76)  Patient-Reported Weight: 150lb  Patient-Reported Height: 5'4\"               No Known Allergies  Prior to Visit Medications    Medication Sig Taking? Authorizing Provider   carvedilol (COREG) 3.125 MG tablet Take 1 tablet by mouth 2 times daily Yes Aileen Bautista, DO   atorvastatin (LIPITOR) 40 MG tablet TAKE 1 TABLET BY MOUTH EVERY NIGHT AT BEDTIME Yes Aileen Bautista, DO   levothyroxine (SYNTHROID) 112 MCG tablet Take 1 tablet by mouth daily Yes Aileen Bautista, DO   clopidogrel (PLAVIX) 75 MG tablet TAKE 1 TABLET BY MOUTH DAILY Yes Aileen Bautista, DO   Omega-3 Fatty Acids (FISH OIL EXTRA STRENGTH PO) Take 2,500 mg by mouth in the morning and at bedtime Yes Provider, MD Bhargav   Bacillus Coagulans-Inulin (PROBIOTIC) 1-250 BILLION-MG CAPS 1 tab by mouth daily Yes Provider, MD Bhargav   losartan (COZAAR) 25 MG tablet TAKE 1 TABLET BY MOUTH DAILY Yes Salina Colon, APRN - NP   Multiple Vitamin (MULTI VITAMIN DAILY PO) Take by mouth Yes Provider, MD Bhargav   calcium carbonate 600 MG TABS tablet Take 1 tablet by mouth daily Yes Provider,

## 2025-06-11 ENCOUNTER — RESULTS FOLLOW-UP (OUTPATIENT)
Dept: CARDIOLOGY | Age: 70
End: 2025-06-11

## 2025-06-11 ENCOUNTER — OFFICE VISIT (OUTPATIENT)
Dept: CARDIOLOGY | Age: 70
End: 2025-06-11
Payer: MEDICARE

## 2025-06-11 VITALS
HEIGHT: 64 IN | SYSTOLIC BLOOD PRESSURE: 120 MMHG | WEIGHT: 148 LBS | DIASTOLIC BLOOD PRESSURE: 62 MMHG | BODY MASS INDEX: 25.27 KG/M2 | OXYGEN SATURATION: 97 % | HEART RATE: 78 BPM

## 2025-06-11 DIAGNOSIS — I35.1 AORTIC VALVE INSUFFICIENCY, ETIOLOGY OF CARDIAC VALVE DISEASE UNSPECIFIED: Primary | ICD-10-CM

## 2025-06-11 PROCEDURE — 99212 OFFICE O/P EST SF 10 MIN: CPT | Performed by: NURSE PRACTITIONER

## 2025-06-11 PROCEDURE — 3017F COLORECTAL CA SCREEN DOC REV: CPT | Performed by: NURSE PRACTITIONER

## 2025-06-11 PROCEDURE — G8400 PT W/DXA NO RESULTS DOC: HCPCS | Performed by: NURSE PRACTITIONER

## 2025-06-11 PROCEDURE — 1123F ACP DISCUSS/DSCN MKR DOCD: CPT | Performed by: NURSE PRACTITIONER

## 2025-06-11 PROCEDURE — 1036F TOBACCO NON-USER: CPT | Performed by: NURSE PRACTITIONER

## 2025-06-11 PROCEDURE — 99214 OFFICE O/P EST MOD 30 MIN: CPT | Performed by: NURSE PRACTITIONER

## 2025-06-11 PROCEDURE — 1159F MED LIST DOCD IN RCRD: CPT | Performed by: NURSE PRACTITIONER

## 2025-06-11 PROCEDURE — 93010 ELECTROCARDIOGRAM REPORT: CPT | Performed by: NURSE PRACTITIONER

## 2025-06-11 PROCEDURE — 93005 ELECTROCARDIOGRAM TRACING: CPT | Performed by: NURSE PRACTITIONER

## 2025-06-11 PROCEDURE — G8417 CALC BMI ABV UP PARAM F/U: HCPCS | Performed by: NURSE PRACTITIONER

## 2025-06-11 PROCEDURE — G8427 DOCREV CUR MEDS BY ELIG CLIN: HCPCS | Performed by: NURSE PRACTITIONER

## 2025-06-11 PROCEDURE — 1126F AMNT PAIN NOTED NONE PRSNT: CPT | Performed by: NURSE PRACTITIONER

## 2025-06-11 PROCEDURE — 1090F PRES/ABSN URINE INCON ASSESS: CPT | Performed by: NURSE PRACTITIONER

## 2025-06-11 NOTE — PROGRESS NOTES
Normal diastolic function.    Aortic Valve: Mild sclerosis of the aortic valve cusps. Mild regurgitation. Mild stenosis of the aortic valve. AV mean gradient is 17 mmHg. AV peak gradient is 29 mmHg. AV peak velocity is 2.7 m/s. AV area by continuity VTI is 1.3 cm2.    Mitral Valve: Mildly thickened leaflets. Mild regurgitation.    Tricuspid Valve: Mild regurgitation.    Image quality is technically difficult.  Assessment and plan:     -Dyspnea on exertion- Treadmill cardiolite stress test was negative for ischemia on 2/15/21.   -Mild to moderate AI and mild to moderate MR- ECHO reviewed and stable.    -CVA 2017- on ASA/plavix. Has loop recorder. Battery depleted 9/24/21 interrogation.    -Negative carotid u/s 2/15/21  -Hyperlipidemia- Continue statin. LDL 42 and  on 7/31/24. Vascepa was added.  -Hypothyroidism- on replacement.  -HTN. Patient to monitor BP at home. Continue Coreg and losartan. Monitor BP at home.    -RTC 6 months.    NICKY SIFUENTES - [facility]  [phone]

## 2025-07-29 ENCOUNTER — HOSPITAL ENCOUNTER (OUTPATIENT)
Age: 70
Discharge: HOME | End: 2025-07-29
Payer: MEDICARE

## 2025-07-29 DIAGNOSIS — R73.01 IMPAIRED FASTING GLUCOSE: ICD-10-CM

## 2025-07-29 DIAGNOSIS — E78.2 MIXED HYPERLIPIDEMIA: ICD-10-CM

## 2025-07-29 DIAGNOSIS — I63.012 CEREBROVASCULAR ACCIDENT (CVA) DUE TO THROMBOSIS OF LEFT VERTEBRAL ARTERY (HCC): ICD-10-CM

## 2025-07-29 DIAGNOSIS — E03.9 ACQUIRED HYPOTHYROIDISM: ICD-10-CM

## 2025-07-29 LAB
ALBUMIN SERPL-MCNC: 4.5 G/DL (ref 3.5–5.2)
ALBUMIN/GLOB SERPL: 2 {RATIO} (ref 1–2.5)
ALP SERPL-CCNC: 104 U/L (ref 35–104)
ALT SERPL-CCNC: 21 U/L (ref 10–35)
ANION GAP SERPL CALCULATED.3IONS-SCNC: 9 MMOL/L (ref 9–16)
AST SERPL-CCNC: 20 U/L (ref 10–35)
BASOPHILS # BLD: 0.04 K/UL (ref 0–0.2)
BASOPHILS NFR BLD: 1 % (ref 0–2)
BILIRUB SERPL-MCNC: 0.8 MG/DL (ref 0–1.2)
BUN SERPL-MCNC: 16 MG/DL (ref 8–23)
BUN/CREAT SERPL: 23 (ref 9–20)
CALCIUM SERPL-MCNC: 10.2 MG/DL (ref 8.6–10.4)
CHLORIDE SERPL-SCNC: 103 MMOL/L (ref 98–107)
CHOLEST SERPL-MCNC: 155 MG/DL (ref 0–199)
CHOLESTEROL/HDL RATIO: 2.5
CO2 SERPL-SCNC: 30 MMOL/L (ref 20–31)
CREAT SERPL-MCNC: 0.7 MG/DL (ref 0.6–0.9)
EOSINOPHIL # BLD: 0.16 K/UL (ref 0–0.44)
EOSINOPHILS RELATIVE PERCENT: 2 % (ref 1–4)
ERYTHROCYTE [DISTWIDTH] IN BLOOD BY AUTOMATED COUNT: 11.9 % (ref 11.8–14.4)
EST. AVERAGE GLUCOSE BLD GHB EST-MCNC: 120 MG/DL
GFR, ESTIMATED: >90 ML/MIN/1.73M2
GLUCOSE SERPL-MCNC: 119 MG/DL (ref 74–99)
HBA1C MFR BLD: 5.8 % (ref 4–6)
HCT VFR BLD AUTO: 39.6 % (ref 36.3–47.1)
HDLC SERPL-MCNC: 61 MG/DL
HGB BLD-MCNC: 13.1 G/DL (ref 11.9–15.1)
IMM GRANULOCYTES # BLD AUTO: <0.03 K/UL (ref 0–0.3)
IMM GRANULOCYTES NFR BLD: 0 %
LDLC SERPL CALC-MCNC: 74 MG/DL (ref 0–100)
LYMPHOCYTES NFR BLD: 1.9 K/UL (ref 1.1–3.7)
LYMPHOCYTES RELATIVE PERCENT: 23 % (ref 24–43)
MCH RBC QN AUTO: 31.2 PG (ref 25.2–33.5)
MCHC RBC AUTO-ENTMCNC: 33.1 G/DL (ref 25.2–33.5)
MCV RBC AUTO: 94.3 FL (ref 82.6–102.9)
MONOCYTES NFR BLD: 0.5 K/UL (ref 0.1–1.2)
MONOCYTES NFR BLD: 6 % (ref 3–12)
NEUTROPHILS NFR BLD: 68 % (ref 36–65)
NEUTS SEG NFR BLD: 5.57 K/UL (ref 1.5–8.1)
NRBC BLD-RTO: 0 PER 100 WBC
PLATELET # BLD AUTO: 207 K/UL (ref 138–453)
PMV BLD AUTO: 9.4 FL (ref 8.1–13.5)
POTASSIUM SERPL-SCNC: 4.6 MMOL/L (ref 3.7–5.3)
PROT SERPL-MCNC: 6.8 G/DL (ref 6.6–8.7)
RBC # BLD AUTO: 4.2 M/UL (ref 3.95–5.11)
SODIUM SERPL-SCNC: 142 MMOL/L (ref 136–145)
T4 FREE SERPL-MCNC: 1.2 NG/DL (ref 0.92–1.68)
TRIGL SERPL-MCNC: 102 MG/DL
TSH SERPL DL<=0.05 MIU/L-ACNC: 2.19 UIU/ML (ref 0.27–4.2)
VLDLC SERPL CALC-MCNC: 20 MG/DL (ref 1–30)
WBC OTHER # BLD: 8.2 K/UL (ref 3.5–11.3)

## 2025-07-29 PROCEDURE — 84443 ASSAY THYROID STIM HORMONE: CPT

## 2025-07-29 PROCEDURE — 83036 HEMOGLOBIN GLYCOSYLATED A1C: CPT

## 2025-07-29 PROCEDURE — 36415 COLL VENOUS BLD VENIPUNCTURE: CPT

## 2025-07-29 PROCEDURE — 80061 LIPID PANEL: CPT

## 2025-07-29 PROCEDURE — 85025 COMPLETE CBC W/AUTO DIFF WBC: CPT

## 2025-07-29 PROCEDURE — 84439 ASSAY OF FREE THYROXINE: CPT

## 2025-07-29 PROCEDURE — 80053 COMPREHEN METABOLIC PANEL: CPT

## 2025-07-31 ENCOUNTER — OFFICE VISIT (OUTPATIENT)
Dept: FAMILY MEDICINE CLINIC | Age: 70
End: 2025-07-31

## 2025-07-31 VITALS
TEMPERATURE: 97 F | OXYGEN SATURATION: 98 % | DIASTOLIC BLOOD PRESSURE: 70 MMHG | WEIGHT: 144.8 LBS | RESPIRATION RATE: 16 BRPM | BODY MASS INDEX: 24.72 KG/M2 | HEIGHT: 64 IN | HEART RATE: 68 BPM | SYSTOLIC BLOOD PRESSURE: 136 MMHG

## 2025-07-31 DIAGNOSIS — M54.16 LUMBAR BACK PAIN WITH RADICULOPATHY AFFECTING RIGHT LOWER EXTREMITY: ICD-10-CM

## 2025-07-31 DIAGNOSIS — E78.2 MIXED HYPERLIPIDEMIA: Primary | ICD-10-CM

## 2025-07-31 DIAGNOSIS — Z12.31 ENCOUNTER FOR SCREENING MAMMOGRAM FOR MALIGNANT NEOPLASM OF BREAST: ICD-10-CM

## 2025-07-31 DIAGNOSIS — R73.01 IMPAIRED FASTING GLUCOSE: ICD-10-CM

## 2025-07-31 DIAGNOSIS — E03.9 ACQUIRED HYPOTHYROIDISM: ICD-10-CM

## 2025-07-31 ASSESSMENT — ENCOUNTER SYMPTOMS
SORE THROAT: 0
EYE DISCHARGE: 0
RHINORRHEA: 0
TROUBLE SWALLOWING: 0
SINUS PRESSURE: 0
COUGH: 0
WHEEZING: 0
DIARRHEA: 0
VOMITING: 0
EYE REDNESS: 0
CONSTIPATION: 0
NAUSEA: 0
ABDOMINAL PAIN: 0
SHORTNESS OF BREATH: 0
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
## 2025-07-31 NOTE — PROGRESS NOTES
Patient declines covid vaccine. Mammogram order placed. Discussed mychart and available options. Patient will keep scheduling option in mind.

## 2025-08-01 ASSESSMENT — ENCOUNTER SYMPTOMS: BACK PAIN: 1

## (undated) DEVICE — DISCONTINUED USE 419147 KIT ENDOSCOPY CUSTOM PACK

## (undated) DEVICE — Z DISCONTINUED USE 2624852 GLOVE SURG 7 PF TEXT NEOPRNE BRN STRL NEOLON 2G LF

## (undated) DEVICE — LINE SAMP O2 6.5FT W/FEMALE CONN F/ADULT CAPNOLINE PLUS

## (undated) DEVICE — 1200CC GUARDIAN II: Brand: GUARDIAN

## (undated) DEVICE — CONNECTOR TBNG AUX H2O JET DISP FOR OLY 160/180 SER